# Patient Record
Sex: MALE | Race: WHITE | Employment: OTHER | URBAN - NONMETROPOLITAN AREA
[De-identification: names, ages, dates, MRNs, and addresses within clinical notes are randomized per-mention and may not be internally consistent; named-entity substitution may affect disease eponyms.]

---

## 2017-07-25 ENCOUNTER — HOSPITAL ENCOUNTER (EMERGENCY)
Age: 32
Discharge: HOME | End: 2017-07-25
Attending: EMERGENCY MEDICINE
Payer: MEDICARE

## 2017-07-25 VITALS
HEIGHT: 72 IN | RESPIRATION RATE: 15 BRPM | BODY MASS INDEX: 42.66 KG/M2 | WEIGHT: 315 LBS | OXYGEN SATURATION: 94 % | DIASTOLIC BLOOD PRESSURE: 79 MMHG | SYSTOLIC BLOOD PRESSURE: 136 MMHG | TEMPERATURE: 98.2 F | HEART RATE: 109 BPM

## 2017-07-25 DIAGNOSIS — I82.493 DEEP VEIN THROMBOSIS (DVT) OF OTHER VEIN OF BOTH LOWER EXTREMITIES, UNSPECIFIED CHRONICITY (HCC): Primary | ICD-10-CM

## 2017-07-25 LAB
ABSOLUTE EOS #: 0.3 K/UL (ref 0–0.4)
ABSOLUTE LYMPH #: 1.7 K/UL (ref 1–4.8)
ABSOLUTE MONO #: 0.6 K/UL (ref 0.1–1.2)
BASOPHILS # BLD: 1 %
BASOPHILS ABSOLUTE: 0.1 K/UL (ref 0–0.2)
DIFFERENTIAL TYPE: NORMAL
EOSINOPHILS RELATIVE PERCENT: 4 %
HCT VFR BLD CALC: 44.9 % (ref 41–53)
HEMOGLOBIN: 14.4 G/DL (ref 13.5–17.5)
INR BLD: 1
LYMPHOCYTES # BLD: 23 %
MCH RBC QN AUTO: 29 PG (ref 26–34)
MCHC RBC AUTO-ENTMCNC: 32 G/DL (ref 31–37)
MCV RBC AUTO: 90.6 FL (ref 80–100)
MONOCYTES # BLD: 8 %
PARTIAL THROMBOPLASTIN TIME: 24.5 SEC (ref 27–35)
PDW BLD-RTO: 13.9 % (ref 11–14.5)
PLATELET # BLD: 153 K/UL (ref 140–450)
PLATELET ESTIMATE: NORMAL
PMV BLD AUTO: 10 FL (ref 6–12)
PROTHROMBIN TIME: 11 SEC (ref 9.4–11.3)
RBC # BLD: 4.95 M/UL (ref 4.5–5.9)
RBC # BLD: NORMAL 10*6/UL
SEG NEUTROPHILS: 64 %
SEGMENTED NEUTROPHILS ABSOLUTE COUNT: 4.6 K/UL (ref 1.8–7.7)
WBC # BLD: 7.3 K/UL (ref 3.5–11)
WBC # BLD: NORMAL 10*3/UL

## 2017-07-25 PROCEDURE — 36415 COLL VENOUS BLD VENIPUNCTURE: CPT

## 2017-07-25 PROCEDURE — 6370000000 HC RX 637 (ALT 250 FOR IP): Performed by: EMERGENCY MEDICINE

## 2017-07-25 PROCEDURE — 96372 THER/PROPH/DIAG INJ SC/IM: CPT

## 2017-07-25 PROCEDURE — 6360000002 HC RX W HCPCS: Performed by: EMERGENCY MEDICINE

## 2017-07-25 PROCEDURE — 99283 EMERGENCY DEPT VISIT LOW MDM: CPT

## 2017-07-25 PROCEDURE — 85610 PROTHROMBIN TIME: CPT

## 2017-07-25 PROCEDURE — 85025 COMPLETE CBC W/AUTO DIFF WBC: CPT

## 2017-07-25 PROCEDURE — 85730 THROMBOPLASTIN TIME PARTIAL: CPT

## 2017-07-25 RX ORDER — WARFARIN SODIUM 5 MG/1
5 TABLET ORAL ONCE
Status: COMPLETED | OUTPATIENT
Start: 2017-07-25 | End: 2017-07-25

## 2017-07-25 RX ORDER — WARFARIN SODIUM 5 MG/1
5 TABLET ORAL DAILY
Qty: 7 TABLET | Refills: 0 | Status: SHIPPED | OUTPATIENT
Start: 2017-07-25 | End: 2018-02-19

## 2017-07-25 RX ADMIN — ENOXAPARIN SODIUM 100 MG: 100 INJECTION SUBCUTANEOUS at 18:07

## 2017-07-25 RX ADMIN — WARFARIN SODIUM 5 MG: 5 TABLET ORAL at 18:07

## 2017-08-11 ENCOUNTER — HOSPITAL ENCOUNTER (EMERGENCY)
Age: 32
Discharge: HOME | End: 2017-08-11
Attending: EMERGENCY MEDICINE
Payer: MEDICARE

## 2017-08-11 VITALS
DIASTOLIC BLOOD PRESSURE: 92 MMHG | SYSTOLIC BLOOD PRESSURE: 149 MMHG | RESPIRATION RATE: 16 BRPM | HEART RATE: 76 BPM | WEIGHT: 315 LBS | BODY MASS INDEX: 42.72 KG/M2 | TEMPERATURE: 98.2 F | OXYGEN SATURATION: 97 %

## 2017-08-11 DIAGNOSIS — R73.9 HYPERGLYCEMIA: Primary | ICD-10-CM

## 2017-08-11 LAB
-: NORMAL
ABSOLUTE EOS #: 0.3 K/UL (ref 0–0.4)
ABSOLUTE LYMPH #: 1.7 K/UL (ref 1–4.8)
ABSOLUTE MONO #: 0.5 K/UL (ref 0.1–1.2)
AMORPHOUS: NORMAL
ANION GAP SERPL CALCULATED.3IONS-SCNC: 14 MMOL/L (ref 9–17)
BACTERIA: NORMAL
BASOPHILS # BLD: 1 %
BASOPHILS ABSOLUTE: 0.1 K/UL (ref 0–0.2)
BILIRUBIN URINE: NEGATIVE
BUN BLDV-MCNC: 26 MG/DL (ref 6–20)
BUN/CREAT BLD: 52 (ref 9–20)
CALCIUM SERPL-MCNC: 9.3 MG/DL (ref 8.6–10.4)
CASTS UA: NORMAL /LPF (ref 0–2)
CHLORIDE BLD-SCNC: 95 MMOL/L (ref 98–107)
CO2: 25 MMOL/L (ref 20–31)
COLOR: ABNORMAL
COMMENT UA: ABNORMAL
CREAT SERPL-MCNC: 0.5 MG/DL (ref 0.7–1.2)
CRYSTALS, UA: NORMAL /HPF
DIFFERENTIAL TYPE: NORMAL
EOSINOPHILS RELATIVE PERCENT: 4 %
EPITHELIAL CELLS UA: NORMAL /HPF (ref 0–5)
GFR AFRICAN AMERICAN: >60 ML/MIN
GFR NON-AFRICAN AMERICAN: >60 ML/MIN
GFR SERPL CREATININE-BSD FRML MDRD: ABNORMAL ML/MIN/{1.73_M2}
GFR SERPL CREATININE-BSD FRML MDRD: ABNORMAL ML/MIN/{1.73_M2}
GLUCOSE BLD-MCNC: 434 MG/DL (ref 75–110)
GLUCOSE BLD-MCNC: 454 MG/DL (ref 75–110)
GLUCOSE BLD-MCNC: 595 MG/DL (ref 70–99)
GLUCOSE URINE: ABNORMAL
HCT VFR BLD CALC: 42.9 % (ref 41–53)
HEMOGLOBIN: 13.9 G/DL (ref 13.5–17.5)
KETONES, URINE: NEGATIVE
LEUKOCYTE ESTERASE, URINE: NEGATIVE
LYMPHOCYTES # BLD: 28 %
MCH RBC QN AUTO: 29.1 PG (ref 26–34)
MCHC RBC AUTO-ENTMCNC: 32.4 G/DL (ref 31–37)
MCV RBC AUTO: 89.6 FL (ref 80–100)
MONOCYTES # BLD: 8 %
MUCUS: NORMAL
NITRITE, URINE: NEGATIVE
OTHER OBSERVATIONS UA: NORMAL
PDW BLD-RTO: 13.6 % (ref 11–14.5)
PH UA: 5.5 (ref 5–6)
PLATELET # BLD: 148 K/UL (ref 140–450)
PLATELET ESTIMATE: NORMAL
PMV BLD AUTO: 9 FL (ref 6–12)
POTASSIUM SERPL-SCNC: 5 MMOL/L (ref 3.7–5.3)
PROTEIN UA: NEGATIVE
RBC # BLD: 4.79 M/UL (ref 4.5–5.9)
RBC # BLD: NORMAL 10*6/UL
RBC UA: NORMAL /HPF (ref 0–4)
RENAL EPITHELIAL, UA: NORMAL /HPF
SEG NEUTROPHILS: 59 %
SEGMENTED NEUTROPHILS ABSOLUTE COUNT: 3.7 K/UL (ref 1.8–7.7)
SODIUM BLD-SCNC: 134 MMOL/L (ref 135–144)
SPECIFIC GRAVITY UA: 1.01 (ref 1.01–1.02)
TRICHOMONAS: NORMAL
TURBIDITY: ABNORMAL
URINE HGB: NEGATIVE
UROBILINOGEN, URINE: NORMAL
WBC # BLD: 6.2 K/UL (ref 3.5–11)
WBC # BLD: NORMAL 10*3/UL
WBC UA: NORMAL /HPF (ref 0–4)
YEAST: NORMAL

## 2017-08-11 PROCEDURE — 6370000000 HC RX 637 (ALT 250 FOR IP): Performed by: EMERGENCY MEDICINE

## 2017-08-11 PROCEDURE — 93005 ELECTROCARDIOGRAM TRACING: CPT

## 2017-08-11 PROCEDURE — 2580000003 HC RX 258: Performed by: EMERGENCY MEDICINE

## 2017-08-11 PROCEDURE — 96372 THER/PROPH/DIAG INJ SC/IM: CPT

## 2017-08-11 PROCEDURE — 99285 EMERGENCY DEPT VISIT HI MDM: CPT

## 2017-08-11 PROCEDURE — 85025 COMPLETE CBC W/AUTO DIFF WBC: CPT

## 2017-08-11 PROCEDURE — 36415 COLL VENOUS BLD VENIPUNCTURE: CPT

## 2017-08-11 PROCEDURE — 81001 URINALYSIS AUTO W/SCOPE: CPT

## 2017-08-11 PROCEDURE — 82947 ASSAY GLUCOSE BLOOD QUANT: CPT

## 2017-08-11 PROCEDURE — 80048 BASIC METABOLIC PNL TOTAL CA: CPT

## 2017-08-11 RX ORDER — MORPHINE SULFATE 4 MG/ML
4 INJECTION, SOLUTION INTRAMUSCULAR; INTRAVENOUS ONCE
Status: DISCONTINUED | OUTPATIENT
Start: 2017-08-11 | End: 2017-08-11

## 2017-08-11 RX ORDER — 0.9 % SODIUM CHLORIDE 0.9 %
1000 INTRAVENOUS SOLUTION INTRAVENOUS ONCE
Status: COMPLETED | OUTPATIENT
Start: 2017-08-11 | End: 2017-08-11

## 2017-08-11 RX ORDER — SODIUM CHLORIDE 9 MG/ML
INJECTION, SOLUTION INTRAVENOUS CONTINUOUS
Status: DISCONTINUED | OUTPATIENT
Start: 2017-08-11 | End: 2017-08-11 | Stop reason: HOSPADM

## 2017-08-11 RX ADMIN — SODIUM CHLORIDE 1000 ML: 900 INJECTION, SOLUTION INTRAVENOUS at 10:17

## 2017-08-11 RX ADMIN — SODIUM CHLORIDE 1000 ML: 0.9 INJECTION, SOLUTION INTRAVENOUS at 10:50

## 2017-08-11 RX ADMIN — SODIUM CHLORIDE: 0.9 INJECTION, SOLUTION INTRAVENOUS at 12:45

## 2017-08-11 RX ADMIN — INSULIN HUMAN 12 UNITS: 100 INJECTION, SOLUTION PARENTERAL at 11:12

## 2017-08-11 ASSESSMENT — ENCOUNTER SYMPTOMS
VOMITING: 0
TROUBLE SWALLOWING: 0
DIARRHEA: 0
RHINORRHEA: 0
ABDOMINAL PAIN: 0
VOICE CHANGE: 0
NAUSEA: 0
COLOR CHANGE: 0
SHORTNESS OF BREATH: 0
COUGH: 0
BACK PAIN: 0

## 2017-08-14 LAB
EKG ATRIAL RATE: 76 BPM
EKG P AXIS: 28 DEGREES
EKG P-R INTERVAL: 170 MS
EKG Q-T INTERVAL: 412 MS
EKG QRS DURATION: 90 MS
EKG QTC CALCULATION (BAZETT): 463 MS
EKG R AXIS: 13 DEGREES
EKG T AXIS: 33 DEGREES
EKG VENTRICULAR RATE: 76 BPM

## 2018-02-05 ENCOUNTER — OFFICE VISIT (OUTPATIENT)
Dept: WOUND CARE | Age: 33
End: 2018-02-05
Payer: MEDICARE

## 2018-02-05 ENCOUNTER — TELEPHONE (OUTPATIENT)
Dept: WOUND CARE | Age: 33
End: 2018-02-05

## 2018-02-05 ENCOUNTER — HOSPITAL ENCOUNTER (OUTPATIENT)
Age: 33
Setting detail: SPECIMEN
Discharge: HOME | End: 2018-02-05
Payer: MEDICARE

## 2018-02-05 VITALS
RESPIRATION RATE: 16 BRPM | DIASTOLIC BLOOD PRESSURE: 74 MMHG | HEART RATE: 102 BPM | SYSTOLIC BLOOD PRESSURE: 113 MMHG | TEMPERATURE: 97.5 F

## 2018-02-05 DIAGNOSIS — L89.614 STAGE IV PRESSURE ULCER OF RIGHT HEEL (HCC): ICD-10-CM

## 2018-02-05 DIAGNOSIS — L89.620 DECUBITUS ULCER, HEEL, LEFT, UNSTAGEABLE (HCC): ICD-10-CM

## 2018-02-05 DIAGNOSIS — E11.51 CONTROLLED TYPE 2 DM WITH PERIPHERAL CIRCULATORY DISORDER (HCC): ICD-10-CM

## 2018-02-05 DIAGNOSIS — I73.9 PVD (PERIPHERAL VASCULAR DISEASE) (HCC): Primary | ICD-10-CM

## 2018-02-05 PROCEDURE — 87185 SC STD ENZYME DETCJ PER NZM: CPT

## 2018-02-05 PROCEDURE — 87075 CULTR BACTERIA EXCEPT BLOOD: CPT

## 2018-02-05 PROCEDURE — 11042 DBRDMT SUBQ TIS 1ST 20SQCM/<: CPT | Performed by: PODIATRIST

## 2018-02-05 PROCEDURE — G8417 CALC BMI ABV UP PARAM F/U: HCPCS | Performed by: PODIATRIST

## 2018-02-05 PROCEDURE — 1036F TOBACCO NON-USER: CPT | Performed by: PODIATRIST

## 2018-02-05 PROCEDURE — 87186 SC STD MICRODIL/AGAR DIL: CPT

## 2018-02-05 PROCEDURE — 87070 CULTURE OTHR SPECIMN AEROBIC: CPT

## 2018-02-05 PROCEDURE — 99203 OFFICE O/P NEW LOW 30 MIN: CPT | Performed by: PODIATRIST

## 2018-02-05 PROCEDURE — G8427 DOCREV CUR MEDS BY ELIG CLIN: HCPCS | Performed by: PODIATRIST

## 2018-02-05 PROCEDURE — 3046F HEMOGLOBIN A1C LEVEL >9.0%: CPT | Performed by: PODIATRIST

## 2018-02-05 PROCEDURE — 87205 SMEAR GRAM STAIN: CPT

## 2018-02-05 PROCEDURE — 87076 CULTURE ANAEROBE IDENT EACH: CPT

## 2018-02-05 PROCEDURE — 11045 DBRDMT SUBQ TISS EACH ADDL: CPT | Performed by: PODIATRIST

## 2018-02-05 PROCEDURE — G8484 FLU IMMUNIZE NO ADMIN: HCPCS | Performed by: PODIATRIST

## 2018-02-05 PROCEDURE — 86403 PARTICLE AGGLUT ANTBDY SCRN: CPT

## 2018-02-05 RX ORDER — CLINDAMYCIN HYDROCHLORIDE 300 MG/1
600 CAPSULE ORAL 3 TIMES DAILY
COMMUNITY
Start: 2018-02-01 | End: 2018-02-08

## 2018-02-05 RX ORDER — INSULIN GLARGINE 100 [IU]/ML
25 INJECTION, SOLUTION SUBCUTANEOUS 2 TIMES DAILY
COMMUNITY
Start: 2018-01-21 | End: 2019-01-08 | Stop reason: ALTCHOICE

## 2018-02-05 RX ORDER — OXYBUTYNIN CHLORIDE 5 MG/1
5 TABLET, EXTENDED RELEASE ORAL DAILY
Status: ON HOLD | COMMUNITY
Start: 2018-01-21 | End: 2019-03-04

## 2018-02-05 RX ORDER — CEFUROXIME AXETIL 500 MG/1
500 TABLET ORAL 2 TIMES DAILY
COMMUNITY
Start: 2018-02-03 | End: 2018-02-07

## 2018-02-05 RX ORDER — ROSUVASTATIN CALCIUM 40 MG/1
40 TABLET, COATED ORAL EVERY EVENING
COMMUNITY
Start: 2018-01-21

## 2018-02-05 RX ORDER — LEVOTHYROXINE SODIUM 0.03 MG/1
25 TABLET ORAL DAILY
COMMUNITY
Start: 2018-01-21

## 2018-02-05 RX ORDER — PIOGLITAZONEHYDROCHLORIDE 30 MG/1
30 TABLET ORAL DAILY
COMMUNITY
Start: 2018-01-21 | End: 2020-10-20 | Stop reason: ALTCHOICE

## 2018-02-05 NOTE — PROGRESS NOTES
Decreased bilateral.  Paresthesias positive. Dysthesias negative. Sharp/dull intact bilateral.    Musculoskeletal: Muscle strength 5/5, bilateral.  Pain absent upon palpation bilateral. Normal medial longitudinal arch, bilateral.  Ankle ROM within normal limits,bilateral.  1st MPJ ROM within normal limits, bilateral.  Dorsally contracted digits absent. No other foot deformities. Integument:   Open lesion present, Right. Ulcer plantar R heel, non viable fibrin and slough, mild red granular base o n edges, sub q fat centrally,  probing to bone, no undermining no malodor. [place] ucler edema noted, no proximal streaking. . L heel unstageable decub ulcer noted. No skin breakdown on edges, no signs of infx. Interdigital maceration absent to web spaces , Bilateral.  Nails thickened, dystrophic and crumbly, discolored with subungual debris. Fissures absent, Bilateral. Hyperkeratotic tissue is absent. Wound 02/05/18 #1: right heel (Active)   Wound Type Wound 2/5/2018  1:29 PM   Wound Pressure Stage  4 2/5/2018  1:29 PM   Wound Cleansed Rinsed/Irrigated with saline 2/5/2018  1:29 PM   Wound Length (cm) 6.3 cm 2/5/2018  1:29 PM   Wound Width (cm) 9.4 cm 2/5/2018  1:29 PM   Wound Depth (cm)  2.2 2/5/2018  1:29 PM   Calculated Wound Size (cm^2) (l*w) 59.22 cm^2 2/5/2018  1:29 PM   Wound Assessment Black;Red;Yellow 2/5/2018  1:29 PM   Drainage Amount Large 2/5/2018  1:29 PM   Odor None 2/5/2018  1:29 PM   [place]-wound Assessment Dry;Calloused 2/5/2018  1:29 PM   Non-staged Wound Description Full thickness 2/5/2018  1:29 PM   Red%Wound Bed 40 2/5/2018  1:29 PM   Yellow%Wound Bed 40 2/5/2018  1:29 PM   Black%Wound Bed 20 2/5/2018  1:29 PM   Culture Taken Yes 2/5/2018  1:50 PM   Number of days: 0             Asessment: Patient is a 34 y.o. male with:   1. PVD (peripheral vascular disease) ([address]     2. Stage IV pressure ulcer of right heel (HCC)  Tissue Culture    SD DEBRIDEMENT, SKIN, SUB-Q TISSUE,=<20 SQ CM   3.  Controlled

## 2018-02-07 ENCOUNTER — TELEPHONE (OUTPATIENT)
Dept: WOUND CARE | Age: 33
End: 2018-02-07

## 2018-02-07 ENCOUNTER — TELEPHONE (OUTPATIENT)
Dept: SURGERY | Age: 33
End: 2018-02-07

## 2018-02-07 NOTE — TELEPHONE ENCOUNTER
Patient is a current patient at [address] [place] [address], on Wednesdays the nursing home has a CNP who does there wound care follow him. Mary Alice Anne is questioning if they should still have the wound care team follow him while in the nursing home or if they should only have Dr. Víctor Villatoro see the patient.

## 2018-02-10 LAB
CULTURE: ABNORMAL
DIRECT EXAM: ABNORMAL
Lab: ABNORMAL
ORGANISM: ABNORMAL
SPECIMEN DESCRIPTION: ABNORMAL
STATUS: ABNORMAL

## 2018-02-19 ENCOUNTER — OFFICE VISIT (OUTPATIENT)
Dept: WOUND CARE | Age: 33
End: 2018-02-19
Payer: MEDICARE

## 2018-02-19 ENCOUNTER — TELEPHONE (OUTPATIENT)
Dept: WOUND CARE | Age: 33
End: 2018-02-19

## 2018-02-19 VITALS — TEMPERATURE: 99.5 F | HEART RATE: 64 BPM | DIASTOLIC BLOOD PRESSURE: 72 MMHG | SYSTOLIC BLOOD PRESSURE: 108 MMHG

## 2018-02-19 DIAGNOSIS — L89.614 STAGE IV PRESSURE ULCER OF RIGHT HEEL (HCC): Primary | ICD-10-CM

## 2018-02-19 PROCEDURE — 11042 DBRDMT SUBQ TIS 1ST 20SQCM/<: CPT | Performed by: PODIATRIST

## 2018-02-19 NOTE — PROGRESS NOTES
Subjective:    Kelly Alonso is a 34 y.o. male who presents with the ulceration of the foot. Patient has been compliant with off loading. Patient relates pain is Absent . Pain is rated 0 out of 10 and is described as none. Currently denies F/C/N/V. No Known Allergies    Past Medical History:   Diagnosis Date    Diabetes mellitus ([facility] [address]     Hyperlipidemia     Prader-Willi syndrome     Sleep apnea        Prior to Admission medications    Medication Sig Start Date End Date Taking? Authorizing Provider   insulin glargine (LANTUS) 100 UNIT/ML injection vial Inject 25 Units into the skin 2 times daily 1/21/18   Historical Provider, MD   insulin regular (HUMULIN R;NOVOLIN R) 100 UNIT/ML injection Inject 12 Units into the skin 3 times daily (before meals) 1/21/18   Historical Provider, MD   levothyroxine (SYNTHROID) 50 MCG tablet Take 50 mcg by mouth Daily 1/21/18   Historical Provider, MD   oxybutynin (DITROPAN-XL) 5 MG extended release tablet Take 5 mg by mouth daily 1/21/18   Historical Provider, MD   pioglitazone (ACTOS) 15 MG tablet Take 15 mg by mouth daily 1/21/18   Historical Provider, MD   rosuvastatin (CRESTOR) 40 MG tablet Take 40 mg by mouth every evening 1/21/18   Historical Provider, MD   warfarin (COUMADIN) 5 MG tablet Take 1 tablet by mouth daily 7/25/17   Chetan Mathews DO   albuterol sulfate  (90 BASE) MCG/ACT inhaler Inhale 2 puffs into the lungs every 6 hours as needed for Wheezing    Historical Provider, MD   CPAP Machine MISC by Does not apply route nightly    Historical Provider, MD   ondansetron (ZOFRAN ODT) 4 MG disintegrating tablet Take 1 tablet by mouth every 8 hours as needed for Nausea or Vomiting 2/24/16   MEGAN Yusuf   dicyclomine (BENTYL) 10 MG capsule Take 1 capsule by mouth 3 times daily as needed (diarrhea/abdominal cramping) 2/24/16   MEGAN Yusuf   warfarin (COUMADIN) 7.5 MG tablet Take daily. Follow up with PCP in 3 days and thereafter for PT/INR checks.

## 2018-02-19 NOTE — PATIENT INSTRUCTIONS
Continue Dakin's dressing to right heel ulcer until next appointment. (If wound looks appropriate next week, Dr. Ryne Toro will order a Wound VAC.)    The Blue Heel suspension boot that is on the left is appropriate. The lambswool boot on the right IS NOT. THERE IS NO OPEN AREA FOR HEEL SUSPENSION AND PRESSURE RELIEF. Please replace ASAP. Follow up 1 week.

## 2018-02-26 ENCOUNTER — TELEPHONE (OUTPATIENT)
Dept: WOUND CARE | Age: 33
End: 2018-02-26

## 2018-02-26 ENCOUNTER — OFFICE VISIT (OUTPATIENT)
Dept: WOUND CARE | Age: 33
End: 2018-02-26
Payer: MEDICARE

## 2018-02-26 VITALS
DIASTOLIC BLOOD PRESSURE: 80 MMHG | TEMPERATURE: 98.1 F | HEART RATE: 84 BPM | RESPIRATION RATE: 18 BRPM | SYSTOLIC BLOOD PRESSURE: 148 MMHG

## 2018-02-26 DIAGNOSIS — E11.49 DM (DIABETES MELLITUS), TYPE 2 WITH NEUROLOGICAL COMPLICATIONS (HCC): ICD-10-CM

## 2018-02-26 DIAGNOSIS — L89.614 STAGE IV PRESSURE ULCER OF RIGHT HEEL (HCC): Primary | ICD-10-CM

## 2018-02-26 PROCEDURE — 11042 DBRDMT SUBQ TIS 1ST 20SQCM/<: CPT | Performed by: PODIATRIST

## 2018-02-26 NOTE — TELEPHONE ENCOUNTER
Call to [facility], spoke with staff nurse, Sandeep Yu, to confirm progress notes  fax received. Sandeep Yu states new orders were reviewed; wound vac ordered w/ planned placement tomorrow, 2/27. Faxed AVS with Wound Care instructions and Progress Note to [facility].

## 2018-02-26 NOTE — PROGRESS NOTES
Subjective:    Yaritza Arnold is a 34 y.o. male who presents with the ulceration of the foot. Patient has been compliant with off loading. Patient relates pain is Absent . Pain is rated 0 out of 10 and is described as none. Currently denies F/C/N/V. No Known Allergies    Past Medical History:   Diagnosis Date    Diabetes mellitus ([facility] [address]     Hyperlipidemia     Prader-Willi syndrome     Sleep apnea        Prior to Admission medications    Medication Sig Start Date End Date Taking? Authorizing Provider   insulin glargine (LANTUS) 100 UNIT/ML injection vial Inject 25 Units into the skin 2 times daily 1/21/18   Historical Provider, MD   insulin regular (HUMULIN R;NOVOLIN R) 100 UNIT/ML injection Inject 12 Units into the skin 3 times daily (before meals) 1/21/18   Historical Provider, MD   levothyroxine (SYNTHROID) 50 MCG tablet Take 50 mcg by mouth Daily 1/21/18   Historical Provider, MD   oxybutynin (DITROPAN-XL) 5 MG extended release tablet Take 5 mg by mouth daily 1/21/18   Historical Provider, MD   pioglitazone (ACTOS) 15 MG tablet Take 15 mg by mouth daily 1/21/18   Historical Provider, MD   rosuvastatin (CRESTOR) 40 MG tablet Take 40 mg by mouth every evening 1/21/18   Historical Provider, MD   albuterol sulfate  (90 BASE) MCG/ACT inhaler Inhale 2 puffs into the lungs every 6 hours as needed for Wheezing    Historical Provider, MD   CPAP Machine MISC by Does not apply route nightly    Historical Provider, MD   metFORMIN (GLUCOPHAGE) 500 MG tablet Take 1,000 mg by mouth 2 times daily (with meals)  1/21/18   Historical Provider, MD       Social History   Substance Use Topics    Smoking status: Never Smoker    Smokeless tobacco: Never Used    Alcohol use No       Review of Systems: All 12 systems reviewed and pertinent positives noted above.     Lower Extremity Physical Examination:     Vitals:   Vitals:    02/26/18 1305   BP: (!) 150/80   Pulse: 84   Resp: 18   Temp: 98.1 °F (36.7 °C)     General: AAO x 3 in NAD. Vascular: DP and PT pulses palpable 2/4, bilateral.  CFT <3 seconds, bilateral.  Hair growth present to the level of the digits, bilateral.  Edema present, bilateral.  Varicosities present, bilateral. Erythema absent, bilateral. Distal Rubor absent bilateral.  Temperature within normal limits bilateral. Hyperpigmentation present bilateral. Atrophic skin yes. Neurological: Sensation Impaired to light touch to level of digits, bilateral.  Protective sensation intact  10/10 sites via 5.07/10g [place]att[place]-Tyrese Monofilament, bilateral.  negative Tinel's, bilateral.  negative Valleix sign, bilateral.  Vibratory abnormal  bilateral.  Reflexes Decreased bilateral.  Paresthesias positive. Dysthesias negative. Sharp/dull intact bilateral.    Musculoskeletal: Muscle strength 5/5, bilateral.  Pain absent upon palpation bilateral. Normal medial longitudinal arch, bilateral.  Ankle ROM within normal limits,bilateral.  1st MPJ ROM within normal limits, bilateral.  Dorsally contracted digits absent. No other foot deformities. Integument:   Open lesion present, Right. Ulcer plantar R heel, non viable fibrin and slough, increased red granular base on edges, sub q fat centrally,  probing to bone but filled in a lot, no undermining no malodor. [place] ucler edema decreased no proximal streaking. L heel unstageable decub ulcer noted. No skin breakdown on edges, no signs of infx. Interdigital maceration absent to web spaces , Bilateral.  Nails thickened, dystrophic and crumbly, discolored with subungual debris. Fissures absent, Bilateral. Hyperkeratotic tissue is absent.     Wound 02/05/18 #1: right heel (Active)   Wound Type Wound 2/5/2018  1:29 PM   Wound Pressure Stage  4 2/5/2018  1:29 PM   Wound Cleansed Rinsed/Irrigated with saline 2/26/2018 12:57 PM   Wound Length (cm) 8 cm 2/26/2018 12:57 PM   Wound Width (cm) 3.2 cm 2/26/2018 12:57 PM   Wound Depth (cm)  3.0 2/26/2018 12:57 PM   Calculated Wound Size (cm^2) heel.  Continue Heel lift offloading boot to wear at all times to relieve pressure from heel. Pt was educated on importance of this and how this can facilitate healing. Information was given on how to order this device. R side boot does not appear to be sufficient enough, needs to offload more at the heel itself  Contact clinic with any questions/problems/concerns or new signs of infection. Follow-up at [facility] in 1 week(s).

## 2018-03-05 ENCOUNTER — OFFICE VISIT (OUTPATIENT)
Dept: WOUND CARE | Age: 33
End: 2018-03-05
Payer: MEDICARE

## 2018-03-05 VITALS — DIASTOLIC BLOOD PRESSURE: 78 MMHG | TEMPERATURE: 98.7 F | SYSTOLIC BLOOD PRESSURE: 110 MMHG | HEART RATE: 90 BPM

## 2018-03-05 DIAGNOSIS — L89.614 STAGE IV PRESSURE ULCER OF RIGHT HEEL (HCC): Primary | ICD-10-CM

## 2018-03-05 DIAGNOSIS — E11.51 CONTROLLED TYPE 2 DM WITH PERIPHERAL CIRCULATORY DISORDER (HCC): ICD-10-CM

## 2018-03-05 PROCEDURE — 11042 DBRDMT SUBQ TIS 1ST 20SQCM/<: CPT | Performed by: PODIATRIST

## 2018-03-05 NOTE — PROGRESS NOTES
obtained with direct pressure. Patient related pain absent post debridement. Orders Placed This Encounter   Procedures    RI DEBRIDEMENT, SKIN, SUB-Q TISSUE,=<20 SQ CM      continue wound VAC ordered. 150 mmHg continuous black foam.  Risks and benefits discussed with pt in detail. Nursing home will be enecouraged to use [employer] brand vac therapy. They have been using off brand and have had issues. They should continue skin prep and consider duoderm or coloplast to enhance the seal  Patient advised importance of continued offloading and stressed the importance of this in regards to wound healing. Today's dressing: saline wet to dry R heel. Continue Heel lift offloading boot to wear at all times to relieve pressure from heel. Pt was educated on importance of this and how this can facilitate healing. Information was given on how to order this device. R side boot does not appear to be sufficient enough, needs to offload more at the heel itself  Contact clinic with any questions/problems/concerns or new signs of infection. Follow-up at [facility] in 1 week(s).

## 2018-03-07 ENCOUNTER — TELEPHONE (OUTPATIENT)
Dept: WOUND CARE | Age: 33
End: 2018-03-07

## 2018-03-12 ENCOUNTER — OFFICE VISIT (OUTPATIENT)
Dept: WOUND CARE | Age: 33
End: 2018-03-12
Payer: MEDICARE

## 2018-03-12 VITALS — SYSTOLIC BLOOD PRESSURE: 138 MMHG | TEMPERATURE: 97.9 F | HEART RATE: 88 BPM | DIASTOLIC BLOOD PRESSURE: 84 MMHG

## 2018-03-12 DIAGNOSIS — L89.614 STAGE IV PRESSURE ULCER OF RIGHT HEEL (HCC): Primary | ICD-10-CM

## 2018-03-12 PROCEDURE — 11042 DBRDMT SUBQ TIS 1ST 20SQCM/<: CPT | Performed by: PODIATRIST

## 2018-03-14 ENCOUNTER — TELEPHONE (OUTPATIENT)
Dept: WOUND CARE | Age: 33
End: 2018-03-14

## 2018-03-19 ENCOUNTER — OFFICE VISIT (OUTPATIENT)
Dept: WOUND CARE | Age: 33
End: 2018-03-19
Payer: COMMERCIAL

## 2018-03-19 VITALS
HEART RATE: 95 BPM | RESPIRATION RATE: 16 BRPM | DIASTOLIC BLOOD PRESSURE: 78 MMHG | SYSTOLIC BLOOD PRESSURE: 120 MMHG | TEMPERATURE: 98.7 F

## 2018-03-19 DIAGNOSIS — E11.51 CONTROLLED TYPE 2 DM WITH PERIPHERAL CIRCULATORY DISORDER (HCC): ICD-10-CM

## 2018-03-19 DIAGNOSIS — L89.614 STAGE IV PRESSURE ULCER OF RIGHT HEEL (HCC): Primary | ICD-10-CM

## 2018-03-19 DIAGNOSIS — L89.622 DECUBITUS ULCER OF LEFT HEEL, STAGE 2 (HCC): ICD-10-CM

## 2018-03-19 PROCEDURE — 97597 DBRDMT OPN WND 1ST 20 CM/<: CPT | Performed by: PODIATRIST

## 2018-03-19 NOTE — PROGRESS NOTES
Subjective:    Zohra Schaffer is a 34 y.o. male who presents with the ulceration of the foot. Patient has been compliant with off loading. Pt does not think dressing has been on L heel. Patient relates pain is Absent . Pain is rated 0 out of 10 and is described as none. Currently denies F/C/N/V. No Known Allergies    Past Medical History:   Diagnosis Date    Diabetes mellitus ([facility] [address]     Hyperlipidemia     Prader-Willi syndrome     Sleep apnea        Prior to Admission medications    Medication Sig Start Date End Date Taking? Authorizing Provider   insulin glargine (LANTUS) 100 UNIT/ML injection vial Inject 25 Units into the skin 2 times daily 1/21/18  Yes Historical Provider, MD   insulin regular (HUMULIN R;NOVOLIN R) 100 UNIT/ML injection Inject 12 Units into the skin 3 times daily (before meals) 1/21/18  Yes Historical Provider, MD   levothyroxine (SYNTHROID) 50 MCG tablet Take 50 mcg by mouth Daily 1/21/18  Yes Historical Provider, MD   oxybutynin (DITROPAN-XL) 5 MG extended release tablet Take 5 mg by mouth daily 1/21/18  Yes Historical Provider, MD   pioglitazone (ACTOS) 15 MG tablet Take 15 mg by mouth daily 1/21/18  Yes Historical Provider, MD   rosuvastatin (CRESTOR) 40 MG tablet Take 40 mg by mouth every evening 1/21/18  Yes Historical Provider, MD   albuterol sulfate  (90 BASE) MCG/ACT inhaler Inhale 2 puffs into the lungs every 6 hours as needed for Wheezing   Yes Historical Provider, MD   CPAP Machine MISC by Does not apply route nightly   Yes Historical Provider, MD   metFORMIN (GLUCOPHAGE) 500 MG tablet Take 1,000 mg by mouth 2 times daily (with meals)  1/21/18  Yes Historical Provider, MD       Social History   Substance Use Topics    Smoking status: Never Smoker    Smokeless tobacco: Never Used    Alcohol use No       Review of Systems: All 12 systems reviewed and pertinent positives noted above.     Lower Extremity Physical Examination:     Vitals:   Vitals:    03/19/18 1329   BP:

## 2018-03-21 ENCOUNTER — TELEPHONE (OUTPATIENT)
Dept: WOUND CARE | Age: 33
End: 2018-03-21

## 2018-03-26 ENCOUNTER — OFFICE VISIT (OUTPATIENT)
Dept: WOUND CARE | Age: 33
End: 2018-03-26
Payer: COMMERCIAL

## 2018-03-26 VITALS
RESPIRATION RATE: 16 BRPM | HEART RATE: 90 BPM | TEMPERATURE: 98 F | SYSTOLIC BLOOD PRESSURE: 142 MMHG | DIASTOLIC BLOOD PRESSURE: 90 MMHG

## 2018-03-26 DIAGNOSIS — E11.51 CONTROLLED TYPE 2 DM WITH PERIPHERAL CIRCULATORY DISORDER (HCC): ICD-10-CM

## 2018-03-26 DIAGNOSIS — L89.614 STAGE IV PRESSURE ULCER OF RIGHT HEEL (HCC): Primary | ICD-10-CM

## 2018-03-26 DIAGNOSIS — L89.622 DECUBITUS ULCER OF LEFT HEEL, STAGE 2 (HCC): ICD-10-CM

## 2018-03-26 PROCEDURE — 97597 DBRDMT OPN WND 1ST 20 CM/<: CPT | Performed by: PODIATRIST

## 2018-03-26 NOTE — PROGRESS NOTES
(36.7 °C)     General: AAO x 3 in NAD. Vascular: DP and PT pulses palpable 2/4, bilateral.  CFT <3 seconds, bilateral.  Hair growth present to the level of the digits, bilateral.  Edema present, bilateral.  Varicosities present, bilateral. Erythema absent, bilateral. Distal Rubor absent bilateral.  Temperature within normal limits bilateral. Hyperpigmentation present bilateral. Atrophic skin yes. Neurological: Sensation Impaired to light touch to level of digits, bilateral.  Protective sensation intact  10/10 sites via 5.07/10g [place]-Tyrese Monofilament, bilateral.  negative Tinel's, bilateral.  negative Valleix sign, bilateral.  Vibratory abnormal  bilateral.  Reflexes Decreased bilateral.  Paresthesias positive. Dysthesias negative. Sharp/dull intact bilateral.  Musculoskeletal: Muscle strength 5/5, bilateral.  Pain absent upon palpation bilateral. Normal medial longitudinal arch, bilateral.  Ankle ROM within normal limits,bilateral.  1st MPJ ROM within normal limits, bilateral.  Dorsally contracted digits absent. No other foot deformities. Integument:   Open lesion present, Right. Ulcer plantar R heel, non viable fibrin and slough, increased red granular base on edges,  No probing to bone ,no undermining no malodor. [place] ucler edema decreased no proximal streaking. L heel stage 2 decub ulcer resolved  Interdigital maceration absent to web spaces , Bilateral.  Nails thickened, dystrophic and crumbly, discolored with subungual debris. Fissures absent, Bilateral. Hyperkeratotic tissue is absent.   Wound 02/05/18 #1: right heel (Active)   Wound Type Wound 3/19/2018  1:24 PM   Wound Pressure Stage  4 3/19/2018  1:24 PM   Dressing/Treatment Vacuum dressing 3/26/2018  1:15 PM   Wound Cleansed Rinsed/Irrigated with saline 3/26/2018  1:15 PM   Wound Length (cm) 6 cm 3/26/2018  1:15 PM   Wound Width (cm) 2.8 cm 3/26/2018  1:15 PM   Wound Depth (cm)  0.5 3/26/2018  1:15 PM   Calculated Wound Size (cm^2) (l*w) removed to promote healing. Please see chart for exact measurements, if not documented then size was less than 20 sq cm.   continue wound VAC. 150 mmHg continuous black foam.  Risks and benefits discussed with pt in detail. Nursing home will be encouraged to use [employer] brand vac therapy. Seems to have been doing better the past week  No debridement needed of the L heel ulcer today. The ulcer is completley resolved at this time. Pt was educated about means of prevention and risk factor modification. Pt should return to the clinic PRN if any further ulceration should occur. Patient advised importance of continued offloading and stressed the importance of this in regards to wound healing. Today's dressing: saline wet to dry R heel, foam L heel  Continue Heel lift offloading boot to wear at all times to relieve pressure from heel. Pt was educated on importance of this and how this can facilitate healing. Information was given on how to order this device. R side boot does not appear to be sufficient enough, needs to offload more at the heel itself  Will pre certify for apligraf application at this time. Pt was educated on procedure and potential benefit of this advanced wound healing modality. All questions were answered  Contact clinic with any questions/problems/concerns or new signs of infection. Follow-up at [facility] in 1 week(s).

## 2018-04-02 ENCOUNTER — OFFICE VISIT (OUTPATIENT)
Dept: WOUND CARE | Age: 33
End: 2018-04-02
Payer: COMMERCIAL

## 2018-04-02 ENCOUNTER — TELEPHONE (OUTPATIENT)
Dept: WOUND CARE | Age: 33
End: 2018-04-02

## 2018-04-02 VITALS
TEMPERATURE: 98.4 F | SYSTOLIC BLOOD PRESSURE: 100 MMHG | RESPIRATION RATE: 18 BRPM | HEART RATE: 74 BPM | DIASTOLIC BLOOD PRESSURE: 70 MMHG

## 2018-04-02 DIAGNOSIS — I73.9 PVD (PERIPHERAL VASCULAR DISEASE) (HCC): ICD-10-CM

## 2018-04-02 DIAGNOSIS — L89.614 STAGE IV PRESSURE ULCER OF RIGHT HEEL (HCC): Primary | ICD-10-CM

## 2018-04-02 PROCEDURE — 15275 SKIN SUB GRAFT FACE/NK/HF/G: CPT | Performed by: PODIATRIST

## 2018-04-02 PROCEDURE — 97597 DBRDMT OPN WND 1ST 20 CM/<: CPT | Performed by: PODIATRIST

## 2018-04-02 RX ORDER — CEPHALEXIN 500 MG/1
500 CAPSULE ORAL 4 TIMES DAILY
COMMUNITY
Start: 2018-03-31 | End: 2018-04-07

## 2018-04-02 RX ORDER — CARBAMAZEPINE 200 MG/1
200 TABLET, EXTENDED RELEASE ORAL 2 TIMES DAILY
COMMUNITY
Start: 2018-03-01 | End: 2019-09-16

## 2018-04-09 ENCOUNTER — OFFICE VISIT (OUTPATIENT)
Dept: WOUND CARE | Age: 33
End: 2018-04-09
Payer: MEDICARE

## 2018-04-09 VITALS
DIASTOLIC BLOOD PRESSURE: 90 MMHG | HEART RATE: 90 BPM | SYSTOLIC BLOOD PRESSURE: 148 MMHG | RESPIRATION RATE: 18 BRPM | TEMPERATURE: 99.2 F

## 2018-04-09 DIAGNOSIS — L89.614 STAGE IV PRESSURE ULCER OF RIGHT HEEL (HCC): Primary | ICD-10-CM

## 2018-04-09 DIAGNOSIS — E11.51 CONTROLLED TYPE 2 DM WITH PERIPHERAL CIRCULATORY DISORDER (HCC): ICD-10-CM

## 2018-04-09 PROCEDURE — 15275 SKIN SUB GRAFT FACE/NK/HF/G: CPT | Performed by: PODIATRIST

## 2018-04-12 ENCOUNTER — TELEPHONE (OUTPATIENT)
Dept: WOUND CARE | Age: 33
End: 2018-04-12

## 2018-04-13 ENCOUNTER — TELEPHONE (OUTPATIENT)
Dept: WOUND CARE | Age: 33
End: 2018-04-13

## 2018-04-16 ENCOUNTER — OFFICE VISIT (OUTPATIENT)
Dept: WOUND CARE | Age: 33
End: 2018-04-16
Payer: MEDICARE

## 2018-04-16 VITALS
SYSTOLIC BLOOD PRESSURE: 157 MMHG | HEART RATE: 90 BPM | DIASTOLIC BLOOD PRESSURE: 83 MMHG | OXYGEN SATURATION: 96 % | TEMPERATURE: 97.8 F

## 2018-04-16 DIAGNOSIS — L89.614 STAGE IV PRESSURE ULCER OF RIGHT HEEL (HCC): Primary | ICD-10-CM

## 2018-04-16 DIAGNOSIS — E11.51 CONTROLLED TYPE 2 DM WITH PERIPHERAL CIRCULATORY DISORDER (HCC): ICD-10-CM

## 2018-04-16 PROCEDURE — 15275 SKIN SUB GRAFT FACE/NK/HF/G: CPT | Performed by: PODIATRIST

## 2018-04-18 ENCOUNTER — TELEPHONE (OUTPATIENT)
Dept: WOUND CARE | Age: 33
End: 2018-04-18

## 2018-04-23 ENCOUNTER — OFFICE VISIT (OUTPATIENT)
Dept: WOUND CARE | Age: 33
End: 2018-04-23
Payer: MEDICARE

## 2018-04-23 ENCOUNTER — TELEPHONE (OUTPATIENT)
Dept: WOUND CARE | Age: 33
End: 2018-04-23

## 2018-04-23 VITALS
SYSTOLIC BLOOD PRESSURE: 148 MMHG | DIASTOLIC BLOOD PRESSURE: 82 MMHG | HEART RATE: 84 BPM | TEMPERATURE: 98.3 F | RESPIRATION RATE: 16 BRPM

## 2018-04-23 DIAGNOSIS — L89.614 STAGE IV PRESSURE ULCER OF RIGHT HEEL (HCC): Primary | ICD-10-CM

## 2018-04-23 DIAGNOSIS — E11.51 CONTROLLED TYPE 2 DM WITH PERIPHERAL CIRCULATORY DISORDER (HCC): ICD-10-CM

## 2018-04-23 PROCEDURE — 15275 SKIN SUB GRAFT FACE/NK/HF/G: CPT | Performed by: PODIATRIST

## 2018-04-30 ENCOUNTER — OFFICE VISIT (OUTPATIENT)
Dept: WOUND CARE | Age: 33
End: 2018-04-30
Payer: MEDICARE

## 2018-04-30 VITALS
SYSTOLIC BLOOD PRESSURE: 140 MMHG | HEART RATE: 86 BPM | TEMPERATURE: 97.2 F | RESPIRATION RATE: 16 BRPM | DIASTOLIC BLOOD PRESSURE: 80 MMHG

## 2018-04-30 DIAGNOSIS — L89.614 STAGE IV PRESSURE ULCER OF RIGHT HEEL (HCC): Primary | ICD-10-CM

## 2018-04-30 PROCEDURE — 97597 DBRDMT OPN WND 1ST 20 CM/<: CPT | Performed by: PODIATRIST

## 2018-05-02 ENCOUNTER — TELEPHONE (OUTPATIENT)
Dept: WOUND CARE | Age: 33
End: 2018-05-02

## 2018-05-07 ENCOUNTER — OFFICE VISIT (OUTPATIENT)
Dept: WOUND CARE | Age: 33
End: 2018-05-07
Payer: MEDICARE

## 2018-05-07 VITALS — DIASTOLIC BLOOD PRESSURE: 84 MMHG | TEMPERATURE: 97.6 F | HEART RATE: 80 BPM | SYSTOLIC BLOOD PRESSURE: 156 MMHG

## 2018-05-07 DIAGNOSIS — L89.614 STAGE IV PRESSURE ULCER OF RIGHT HEEL (HCC): Primary | ICD-10-CM

## 2018-05-07 PROCEDURE — 97597 DBRDMT OPN WND 1ST 20 CM/<: CPT | Performed by: PODIATRIST

## 2018-05-09 ENCOUNTER — TELEPHONE (OUTPATIENT)
Dept: WOUND CARE | Age: 33
End: 2018-05-09

## 2018-05-21 ENCOUNTER — OFFICE VISIT (OUTPATIENT)
Dept: WOUND CARE | Age: 33
End: 2018-05-21
Payer: MEDICARE

## 2018-05-21 VITALS
SYSTOLIC BLOOD PRESSURE: 152 MMHG | HEART RATE: 90 BPM | DIASTOLIC BLOOD PRESSURE: 98 MMHG | TEMPERATURE: 98.5 F | RESPIRATION RATE: 16 BRPM | OXYGEN SATURATION: 99 %

## 2018-05-21 DIAGNOSIS — L89.614 STAGE IV PRESSURE ULCER OF RIGHT HEEL (HCC): Primary | ICD-10-CM

## 2018-05-21 PROCEDURE — 1036F TOBACCO NON-USER: CPT | Performed by: PODIATRIST

## 2018-05-21 PROCEDURE — G8427 DOCREV CUR MEDS BY ELIG CLIN: HCPCS | Performed by: PODIATRIST

## 2018-05-21 PROCEDURE — G8417 CALC BMI ABV UP PARAM F/U: HCPCS | Performed by: PODIATRIST

## 2018-05-21 PROCEDURE — 99212 OFFICE O/P EST SF 10 MIN: CPT | Performed by: PODIATRIST

## 2018-05-22 ENCOUNTER — TELEPHONE (OUTPATIENT)
Dept: WOUND CARE | Age: 33
End: 2018-05-22

## 2018-07-16 ENCOUNTER — OFFICE VISIT (OUTPATIENT)
Dept: WOUND CARE | Age: 33
End: 2018-07-16
Payer: COMMERCIAL

## 2018-07-16 VITALS — SYSTOLIC BLOOD PRESSURE: 141 MMHG | HEART RATE: 86 BPM | TEMPERATURE: 98.8 F | DIASTOLIC BLOOD PRESSURE: 82 MMHG

## 2018-07-16 DIAGNOSIS — L97.411 ULCER OF RIGHT HEEL AND MIDFOOT, LIMITED TO BREAKDOWN OF SKIN (HCC): Primary | ICD-10-CM

## 2018-07-16 DIAGNOSIS — E11.51 CONTROLLED TYPE 2 DM WITH PERIPHERAL CIRCULATORY DISORDER (HCC): ICD-10-CM

## 2018-07-16 PROBLEM — L89.622 DECUBITUS ULCER OF LEFT HEEL, STAGE 2 (HCC): Status: RESOLVED | Noted: 2018-03-19 | Resolved: 2018-07-16

## 2018-07-16 PROBLEM — L89.614 STAGE IV PRESSURE ULCER OF RIGHT HEEL (HCC): Status: RESOLVED | Noted: 2018-02-05 | Resolved: 2018-07-16

## 2018-07-16 PROCEDURE — 97597 DBRDMT OPN WND 1ST 20 CM/<: CPT | Performed by: PODIATRIST

## 2018-07-16 PROCEDURE — 3046F HEMOGLOBIN A1C LEVEL >9.0%: CPT | Performed by: PODIATRIST

## 2018-07-16 PROCEDURE — G8417 CALC BMI ABV UP PARAM F/U: HCPCS | Performed by: PODIATRIST

## 2018-07-16 PROCEDURE — 1036F TOBACCO NON-USER: CPT | Performed by: PODIATRIST

## 2018-07-16 PROCEDURE — 2022F DILAT RTA XM EVC RTNOPTHY: CPT | Performed by: PODIATRIST

## 2018-07-16 PROCEDURE — 99213 OFFICE O/P EST LOW 20 MIN: CPT | Performed by: PODIATRIST

## 2018-07-16 PROCEDURE — G8427 DOCREV CUR MEDS BY ELIG CLIN: HCPCS | Performed by: PODIATRIST

## 2018-07-16 RX ORDER — LOPERAMIDE HYDROCHLORIDE 2 MG/1
2 CAPSULE ORAL 3 TIMES DAILY PRN
COMMUNITY

## 2018-07-16 RX ORDER — ACETAMINOPHEN 500 MG
500 TABLET ORAL EVERY 6 HOURS PRN
COMMUNITY

## 2018-07-16 NOTE — PROGRESS NOTES
tablet Take 1,000 mg by mouth 2 times daily (with meals)  1/21/18  Yes Historical Provider, MD   insulin glargine (LANTUS) 100 UNIT/ML injection vial Inject 25 Units into the skin 2 times daily 1/21/18   Historical Provider, MD   insulin regular (HUMULIN R;NOVOLIN R) 100 UNIT/ML injection Inject 12 Units into the skin 3 times daily (before meals) 1/21/18   Historical Provider, MD   CPAP Machine MISC by Does not apply route nightly    Historical Provider, MD       Social History   Substance Use Topics    Smoking status: Never Smoker    Smokeless tobacco: Never Used    Alcohol use No       Review of Systems: All 12 systems reviewed and pertinent positives noted above. Lower Extremity Physical Examination:     Vitals:   Vitals:    07/16/18 1332   BP: (!) 141/82   Pulse: 86   Temp: 98.8 °F (37.1 °C)     General: AAO x 3 in NAD. Vascular: DP and PT pulses palpable 2/4, bilateral.  CFT <3 seconds, bilateral.  Hair growth present to the level of the digits, bilateral.  Edema present, bilateral.  Varicosities present, bilateral. Erythema absent, bilateral. Distal Rubor absent bilateral.  Temperature within normal limits bilateral. Hyperpigmentation present bilateral. Atrophic skin yes. Neurological: Sensation Impaired to light touch to level of digits, bilateral.  Protective sensation intact  10/10 sites via 5.07/10g [place]-Tyrese Monofilament, bilateral.  negative Tinel's, bilateral.  negative Valleix sign, bilateral.  Vibratory abnormal  bilateral.  Reflexes Decreased bilateral.  Paresthesias positive. Dysthesias negative. Sharp/dull intact bilateral.  Musculoskeletal: Muscle strength 5/5, bilateral.  Pain absent upon palpation bilateral. Normal medial longitudinal arch, bilateral.  Ankle ROM within normal limits,bilateral.  1st MPJ ROM within normal limits, bilateral.  Dorsally contracted digits absent. No other foot deformities. Integument:   Open lesion present, Right.   Ulcer plantar R heel, positive

## 2018-07-18 ENCOUNTER — TELEPHONE (OUTPATIENT)
Dept: WOUND CARE | Age: 33
End: 2018-07-18

## 2018-07-23 ENCOUNTER — OFFICE VISIT (OUTPATIENT)
Dept: WOUND CARE | Age: 33
End: 2018-07-23
Payer: COMMERCIAL

## 2018-07-23 VITALS — TEMPERATURE: 98.8 F | SYSTOLIC BLOOD PRESSURE: 133 MMHG | DIASTOLIC BLOOD PRESSURE: 84 MMHG | HEART RATE: 90 BPM

## 2018-07-23 DIAGNOSIS — L97.411: Primary | ICD-10-CM

## 2018-07-23 PROCEDURE — 97597 DBRDMT OPN WND 1ST 20 CM/<: CPT | Performed by: PODIATRIST

## 2018-07-23 PROCEDURE — 99999 PR OFFICE/OUTPT VISIT,PROCEDURE ONLY: CPT | Performed by: PODIATRIST

## 2018-07-23 NOTE — PROGRESS NOTES
Historical Provider, MD   albuterol sulfate  (90 BASE) MCG/ACT inhaler Inhale 2 puffs into the lungs every 6 hours as needed for Wheezing   Yes Historical Provider, MD   CPAP Machine MISC by Does not apply route nightly   Yes Historical Provider, MD   metFORMIN (GLUCOPHAGE) 500 MG tablet Take 1,000 mg by mouth 2 times daily (with meals)  1/21/18  Yes Historical Provider, MD       Social History   Substance Use Topics    Smoking status: Never Smoker    Smokeless tobacco: Never Used    Alcohol use No       Review of Systems: All 12 systems reviewed and pertinent positives noted above. Lower Extremity Physical Examination:     Vitals:   Vitals:    07/23/18 1405   BP: 133/84   Pulse: 90   Temp: 98.8 °F (37.1 °C)     General: AAO x 3 in NAD. Vascular: DP and PT pulses palpable 2/4, bilateral.  CFT <3 seconds, bilateral.  Hair growth present to the level of the digits, bilateral.  Edema present, bilateral.  Varicosities present, bilateral. Erythema absent, bilateral. Distal Rubor absent bilateral.  Temperature within normal limits bilateral. Hyperpigmentation present bilateral. Atrophic skin yes. Neurological: Sensation Impaired to light touch to level of digits, bilateral.  Protective sensation intact  10/10 sites via 5.07/10g [place]-Tyrese Monofilament, bilateral.  negative Tinel's, bilateral.  negative Valleix sign, bilateral.  Vibratory abnormal  bilateral.  Reflexes Decreased bilateral.  Paresthesias positive. Dysthesias negative. Sharp/dull intact bilateral.  Musculoskeletal: Muscle strength 5/5, bilateral.  Pain absent upon palpation bilateral. Normal medial longitudinal arch, bilateral.  Ankle ROM within normal limits,bilateral.  1st MPJ ROM within normal limits, bilateral.  Dorsally contracted digits absent. No other foot deformities. Integument:   Open lesion present, Right. Ulcer plantar R heel, positive fibrin, healthy red granular base, no probing no undermining no malodor.   No surrounding signs of infx. Interdigital maceration absent to web spaces , Bilateral.  Nails thickened, dystrophic and crumbly, discolored with subungual debris. Fissures absent, Bilateral. Hyperkeratotic tissue is absent  Wound 07/16/18 #1 right heel wound (Active)   Wound Type Wound 7/16/2018  1:45 PM   Dressing/Treatment Foam 7/16/2018  1:53 PM   Wound Cleansed Rinsed/Irrigated with saline 7/16/2018  1:45 PM   Wound Length (cm) 1 cm 7/23/2018  2:02 PM   Wound Width (cm) 1.9 cm 7/23/2018  2:02 PM   Wound Depth (cm)  0.1 7/23/2018  2:02 PM   Calculated Wound Size (cm^2) (l*w) 1.9 cm^2 7/23/2018  2:02 PM   Change in Wound Size % (l*w) 4.04 7/23/2018  2:02 PM   Drainage Amount Small 7/23/2018  2:02 PM   Drainage Description Serosanguinous 7/23/2018  2:02 PM   Odor None 7/23/2018  2:02 PM   Margins Defined edges 7/16/2018  1:45 PM   Galilea-wound Assessment Dry 7/23/2018  2:02 PM   [place]%Wound Bed 100 7/23/2018  2:02 PM   Red%Wound Bed 100 7/16/2018  1:53 PM   Yellow%Wound Bed 5 7/16/2018  1:45 PM   Number of days: 7             Asessment: Patient is a 28 y.o. male with:    Diagnosis Orders   1. Lower limb ulcer, heel or midfoot, right, limited to breakdown of skin (HCC)  CO DEBRIDEMENT OPEN WOUND 20 SQ CM<     Ulcer Classification:  R heel ulcer full thickness grade 1 C  IDSA  no infection. Plan:   Patient examined and evaluated. Current condition and treatment options discussed in detail. Dressing: foam  Active wound management took place 100% non excisional with the use of  tissue nippers. All non viable tissue (including epidermis and/or dermis) and bio burden was removed to promote healing. Please see chart for exact measurements, if not documented then size was less than 20 sq cm.  continue Heel lift offloading boot to wear at all times to relieve pressure from heel. Pt was educated on importance of this and how this can facilitate healing. Information was given on how to order this device.   Contact clinic with any questions/problems/concerns or new signs of infection.  Follow-up at [facility] in 1 week

## 2018-07-24 ENCOUNTER — TELEPHONE (OUTPATIENT)
Dept: WOUND CARE | Age: 33
End: 2018-07-24

## 2018-07-24 NOTE — TELEPHONE ENCOUNTER
Faxed AVS with Wound Care instructions and Progress Note to [address] on 7/23/18.

## 2018-08-06 ENCOUNTER — OFFICE VISIT (OUTPATIENT)
Dept: WOUND CARE | Age: 33
End: 2018-08-06
Payer: COMMERCIAL

## 2018-08-06 VITALS — TEMPERATURE: 98 F | DIASTOLIC BLOOD PRESSURE: 86 MMHG | HEART RATE: 103 BPM | SYSTOLIC BLOOD PRESSURE: 126 MMHG

## 2018-08-06 DIAGNOSIS — L97.411 SKIN ULCER OF RIGHT HEEL, LIMITED TO BREAKDOWN OF SKIN (HCC): Primary | ICD-10-CM

## 2018-08-06 DIAGNOSIS — R60.0 EDEMA LEG: ICD-10-CM

## 2018-08-06 PROCEDURE — 97597 DBRDMT OPN WND 1ST 20 CM/<: CPT | Performed by: PODIATRIST

## 2018-08-06 PROCEDURE — 99999 PR OFFICE/OUTPT VISIT,PROCEDURE ONLY: CPT | Performed by: PODIATRIST

## 2018-08-06 NOTE — PROGRESS NOTES
base, no probing no undermining no malodor. No surrounding signs of infx. Interdigital maceration absent to web spaces , Bilateral.  Nails thickened, dystrophic and crumbly, discolored with subungual debris. Fissures absent, Bilateral. Hyperkeratotic tissue is absent  Wound 07/16/18 #1 right heel wound (Active)   Wound Type Wound 7/16/2018  1:45 PM   Dressing/Treatment Foam 7/16/2018  1:53 PM   Wound Cleansed Rinsed/Irrigated with saline 7/16/2018  1:45 PM   Wound Length (cm) 1.5 cm 8/6/2018 11:29 AM   Wound Width (cm) 1 cm 8/6/2018 11:29 AM   Wound Depth (cm)  0.1 8/6/2018 11:29 AM   Calculated Wound Size (cm^2) (l*w) 1.5 cm^2 8/6/2018 11:29 AM   Change in Wound Size % (l*w) 24.24 8/6/2018 11:29 AM   Drainage Amount Small 8/6/2018 11:29 AM   Drainage Description Serosanguinous 8/6/2018 11:29 AM   Odor None 8/6/2018 11:29 AM   Margins Defined edges 7/16/2018  1:45 PM   Galilea-wound Assessment Dry 7/23/2018  2:02 PM   [place]%Wound Bed 100 8/6/2018 11:29 AM   Red%Wound Bed 100 7/16/2018  1:53 PM   Yellow%Wound Bed 5 7/16/2018  1:45 PM   Number of days: 21             Asessment: Patient is a 28 y.o. male with:    Diagnosis Orders   1. Skin ulcer of right heel, limited to breakdown of skin (HCC)  HI DEBRIDEMENT OPEN WOUND 20 SQ CM<   2. Edema leg  HI DEBRIDEMENT OPEN WOUND 20 SQ CM<     Ulcer Classification:  R heel ulcer full thickness grade 1 C  IDSA  no infection. Plan:   Patient examined and evaluated. Current condition and treatment options discussed in detail. Dressing: foam  Active wound management took place 100% non excisional with the use of  tissue nippers. All non viable tissue (including epidermis and/or dermis) and bio burden was removed to promote healing. Please see chart for exact measurements, if not documented then size was less than 20 sq cm.  continue Heel lift offloading boot to wear at all times to relieve pressure from heel.   Pt was educated on importance of this and how this can facilitate healing. Information was given on how to order this device. Pt Was extremely upset at this appointment. Patient was verbally abusive to staff. Security was called which only then the patient settled down slightly. Patient was extremely upset his leg was red and would not change the wrap he had an placed on the right leg. Patient was advised that he needs to not use the wrap is in place only on the proximal portion the right leg leading to distal swollen erythema along swelling out the hole that was in the. Patient Stating he was told to wear that but cannot tell us who gave the order. Patient was educated in potential risk if he would continue to wear that device  Contact clinic with any questions/problems/concerns or new signs of infection.  Follow-up at [facility] in 1 week

## 2018-08-07 ENCOUNTER — TELEPHONE (OUTPATIENT)
Dept: WOUND CARE | Age: 33
End: 2018-08-07

## 2018-08-13 ENCOUNTER — OFFICE VISIT (OUTPATIENT)
Dept: WOUND CARE | Age: 33
End: 2018-08-13
Payer: COMMERCIAL

## 2018-08-13 VITALS
TEMPERATURE: 97.6 F | DIASTOLIC BLOOD PRESSURE: 80 MMHG | OXYGEN SATURATION: 97 % | SYSTOLIC BLOOD PRESSURE: 130 MMHG | HEART RATE: 89 BPM

## 2018-08-13 DIAGNOSIS — L97.411 SKIN ULCER OF RIGHT HEEL, LIMITED TO BREAKDOWN OF SKIN (HCC): Primary | ICD-10-CM

## 2018-08-13 DIAGNOSIS — E11.51 CONTROLLED TYPE 2 DM WITH PERIPHERAL CIRCULATORY DISORDER (HCC): ICD-10-CM

## 2018-08-13 PROCEDURE — 99999 PR OFFICE/OUTPT VISIT,PROCEDURE ONLY: CPT | Performed by: PODIATRIST

## 2018-08-13 PROCEDURE — 97597 DBRDMT OPN WND 1ST 20 CM/<: CPT | Performed by: PODIATRIST

## 2018-08-13 NOTE — PATIENT INSTRUCTIONS
Continue foam dressing to right heel wound, change on Wednesday, and Friday, and as needed for soiling or dislodged. May remove dressing to shower, cleanse wound with mild soap & water, rinse well & pat dry gently, then apply clean dressing. Follow up with Dr. Edgardo Barr in 1 week. SIGNS OF INFECTION  - Redness, swelling, skin hot  - Wound bed turns black or stringy yellow  - Foul odor  - Increased drainage or pus  - Increased pain  - Fever greater than 100F    CALL YOUR DOCTOR OR SEEK MEDICAL ATTENTION IF SIGNS OF INFECTION.   DO NOT WAIT UNTIL YOUR NEXT APPOINTMENT    Call the Wound Care Nurse with any other questions or concerns- [phone]

## 2018-08-14 ENCOUNTER — TELEPHONE (OUTPATIENT)
Dept: WOUND CARE | Age: 33
End: 2018-08-14

## 2018-08-20 ENCOUNTER — OFFICE VISIT (OUTPATIENT)
Dept: WOUND CARE | Age: 33
End: 2018-08-20
Payer: MEDICARE

## 2018-08-20 VITALS — SYSTOLIC BLOOD PRESSURE: 118 MMHG | TEMPERATURE: 97.2 F | DIASTOLIC BLOOD PRESSURE: 88 MMHG | HEART RATE: 85 BPM

## 2018-08-20 DIAGNOSIS — L97.411 SKIN ULCER OF RIGHT HEEL, LIMITED TO BREAKDOWN OF SKIN (HCC): Primary | ICD-10-CM

## 2018-08-20 DIAGNOSIS — E11.49 DM (DIABETES MELLITUS), TYPE 2 WITH NEUROLOGICAL COMPLICATIONS (HCC): ICD-10-CM

## 2018-08-20 PROCEDURE — 99999 PR OFFICE/OUTPT VISIT,PROCEDURE ONLY: CPT | Performed by: PODIATRIST

## 2018-08-20 PROCEDURE — 15275 SKIN SUB GRAFT FACE/NK/HF/G: CPT | Performed by: PODIATRIST

## 2018-08-20 NOTE — PROGRESS NOTES
Subjective:    Aurora Calvo is a 28 y.o. male who presents with the ulceration of the R heel. Patient relates pain is Absent . Pain is rated 0 out of 10 and is described as none. Currently denies F/C/N/V. No Known Allergies    Past Medical History:   Diagnosis Date    Diabetes mellitus ([facility] [address]     Hyperlipidemia     Prader-Willi syndrome     Sleep apnea        Prior to Admission medications    Medication Sig Start Date End Date Taking?  Authorizing Provider   loperamide (IMODIUM) 2 MG capsule Take 2 mg by mouth 3 times daily as needed for Diarrhea   Yes Historical Provider, MD   acetaminophen (TYLENOL) 500 MG tablet Take 500 mg by mouth every 6 hours as needed for Pain or Fever (q 4-6 hours prn)   Yes Historical Provider, MD   insulin detemir (LEVEMIR FLEXTOUCH) 100 UNIT/ML injection pen Inject 45 Units into the skin 2 times daily 4/10/18  Yes Historical Provider, MD   insulin aspart (NOVOLOG) 100 UNIT/ML injection vial Inject into the skin 3 times daily (before meals)   Yes Historical Provider, MD   carBAMazepine (TEGRETOL XR) 200 MG extended release tablet Take 200 mg by mouth 2 times daily 3/1/18  Yes Historical Provider, MD   insulin glargine (LANTUS) 100 UNIT/ML injection vial Inject 25 Units into the skin 2 times daily 1/21/18  Yes Historical Provider, MD   insulin regular (HUMULIN R;NOVOLIN R) 100 UNIT/ML injection Inject 12 Units into the skin 3 times daily (before meals) 1/21/18  Yes Historical Provider, MD   levothyroxine (SYNTHROID) 50 MCG tablet Take 50 mcg by mouth Daily 1/21/18  Yes Historical Provider, MD   oxybutynin (DITROPAN-XL) 5 MG extended release tablet Take 5 mg by mouth daily 1/21/18  Yes Historical Provider, MD   pioglitazone (ACTOS) 15 MG tablet Take 15 mg by mouth daily 1/21/18  Yes Historical Provider, MD   rosuvastatin (CRESTOR) 40 MG tablet Take 40 mg by mouth every evening 1/21/18  Yes Historical Provider, MD   albuterol sulfate  (90 BASE) MCG/ACT inhaler Inhale 2 puffs Nails thickened, dystrophic and crumbly, discolored with subungual debris. Fissures absent, Bilateral. Hyperkeratotic tissue is absent  Wound 07/16/18 #1 right heel wound (Active)   Wound Type Wound 7/16/2018  1:45 PM   Dressing/Treatment Foam 7/16/2018  1:53 PM   Wound Cleansed Rinsed/Irrigated with saline 7/16/2018  1:45 PM   Wound Length (cm) 0.8 cm 8/20/2018  1:22 PM   Wound Width (cm) 0.6 cm 8/20/2018  1:22 PM   Wound Depth (cm)  0.2 8/20/2018  1:22 PM   Calculated Wound Size (cm^2) (l*w) 0.48 cm^2 8/20/2018  1:22 PM   Change in Wound Size % (l*w) 75.76 8/20/2018  1:22 PM   Drainage Amount Small 8/20/2018  1:22 PM   Drainage Description Serosanguinous 8/20/2018  1:22 PM   Odor None 8/20/2018  1:22 PM   Margins Defined edges 7/16/2018  1:45 PM   Galilea-wound Assessment Dry 7/23/2018  2:02 PM   [place]%Wound Bed 100 8/20/2018  1:22 PM   Red%Wound Bed 100 7/16/2018  1:53 PM   Yellow%Wound Bed 5 7/16/2018  1:45 PM   Number of days: 35         Asessment: Patient is a 28 y.o. male with:    Diagnosis Orders   1. Skin ulcer of right heel, limited to breakdown of skin (HCC)  ND APLIGRAF    ND SUB GRFT F/S/N/H/F/G/M/D /<100SCM /<1ST 25 SCM   2. DM (diabetes mellitus), type 2 with neurological complications (HCC)  ND APLIGRAF    ND SUB GRFT F/S/N/H/F/G/M/D /<100SCM /<1ST 25 SCM     Ulcer Classification:  R heel ulcer full thickness grade 1 C  IDSA  no infection. Plan:  Patient presents today for the first Apligraf application. Sharp excisional debridement of the ulcer took place for preparation of the wound bed. Hemostasis was achieved. Apligraf was checked and good pH. Apligraf was prepped and placed on the ulceration. 25% was used and 75% wasted. Please see chart for exact ulcer measurements. Apligraf was then secured in place using wound veil and steri strips. Dressing took place with mineral oil gauze, dry gauze, kiley, and coban. Patient will continue offloading as advised.   Patient will follow up in 1

## 2018-08-21 ENCOUNTER — TELEPHONE (OUTPATIENT)
Dept: WOUND CARE | Age: 33
End: 2018-08-21

## 2018-08-21 NOTE — TELEPHONE ENCOUNTER
Faxed AVS with Wound Care instructions and Progress Note to [address] on 8/20/18.

## 2018-08-27 ENCOUNTER — TELEPHONE (OUTPATIENT)
Dept: WOUND CARE | Age: 33
End: 2018-08-27

## 2018-08-27 ENCOUNTER — OFFICE VISIT (OUTPATIENT)
Dept: WOUND CARE | Age: 33
End: 2018-08-27
Payer: MEDICARE

## 2018-08-27 VITALS — SYSTOLIC BLOOD PRESSURE: 149 MMHG | HEART RATE: 88 BPM | TEMPERATURE: 97.2 F | DIASTOLIC BLOOD PRESSURE: 74 MMHG

## 2018-08-27 DIAGNOSIS — E11.49 DM (DIABETES MELLITUS), TYPE 2 WITH NEUROLOGICAL COMPLICATIONS (HCC): ICD-10-CM

## 2018-08-27 DIAGNOSIS — L97.411 SKIN ULCER OF RIGHT HEEL, LIMITED TO BREAKDOWN OF SKIN (HCC): Primary | ICD-10-CM

## 2018-08-27 PROCEDURE — 15275 SKIN SUB GRAFT FACE/NK/HF/G: CPT | Performed by: PODIATRIST

## 2018-08-27 PROCEDURE — 99999 PR OFFICE/OUTPT VISIT,PROCEDURE ONLY: CPT | Performed by: PODIATRIST

## 2018-08-27 NOTE — TELEPHONE ENCOUNTER
Faxed AVS with Wound Care instructions and Progress Note to [address].

## 2018-08-27 NOTE — PROGRESS NOTES
Subjective:    Edelmiro Dandy is a 28 y.o. male who presents with the ulceration of the R heel. Patient relates pain is Absent . Pain is rated 0 out of 10 and is described as none. Currently denies F/C/N/V. No Known Allergies    Past Medical History:   Diagnosis Date    Diabetes mellitus ([facility] [address]     Hyperlipidemia     Prader-Willi syndrome     Sleep apnea        Prior to Admission medications    Medication Sig Start Date End Date Taking?  Authorizing Provider   loperamide (IMODIUM) 2 MG capsule Take 2 mg by mouth 3 times daily as needed for Diarrhea   Yes Historical Provider, MD   acetaminophen (TYLENOL) 500 MG tablet Take 500 mg by mouth every 6 hours as needed for Pain or Fever (q 4-6 hours prn)   Yes Historical Provider, MD   insulin detemir (LEVEMIR FLEXTOUCH) 100 UNIT/ML injection pen Inject 45 Units into the skin 2 times daily 4/10/18  Yes Historical Provider, MD   insulin aspart (NOVOLOG) 100 UNIT/ML injection vial Inject into the skin 3 times daily (before meals)   Yes Historical Provider, MD   carBAMazepine (TEGRETOL XR) 200 MG extended release tablet Take 200 mg by mouth 2 times daily 3/1/18  Yes Historical Provider, MD   insulin glargine (LANTUS) 100 UNIT/ML injection vial Inject 25 Units into the skin 2 times daily 1/21/18  Yes Historical Provider, MD   insulin regular (HUMULIN R;NOVOLIN R) 100 UNIT/ML injection Inject 12 Units into the skin 3 times daily (before meals) 1/21/18  Yes Historical Provider, MD   levothyroxine (SYNTHROID) 50 MCG tablet Take 50 mcg by mouth Daily 1/21/18  Yes Historical Provider, MD   oxybutynin (DITROPAN-XL) 5 MG extended release tablet Take 5 mg by mouth daily 1/21/18  Yes Historical Provider, MD   pioglitazone (ACTOS) 15 MG tablet Take 15 mg by mouth daily 1/21/18  Yes Historical Provider, MD   rosuvastatin (CRESTOR) 40 MG tablet Take 40 mg by mouth every evening 1/21/18  Yes Historical Provider, MD   albuterol sulfate  (90 BASE) MCG/ACT inhaler Inhale 2 puffs into the lungs every 6 hours as needed for Wheezing   Yes Historical Provider, MD   CPAP Machine MISC by Does not apply route nightly   Yes Historical Provider, MD   metFORMIN (GLUCOPHAGE) 500 MG tablet Take 1,000 mg by mouth 2 times daily (with meals)  1/21/18  Yes Historical Provider, MD       Social History   Substance Use Topics    Smoking status: Never Smoker    Smokeless tobacco: Never Used    Alcohol use No       Review of Systems: All 12 systems reviewed and pertinent positives noted above. Lower Extremity Physical Examination:     Vitals:   Vitals:    08/27/18 1301   BP: (!) 149/74   Pulse: 88   Temp: 97.2 °F (36.2 °C)     General: AAO x 3 in NAD. Vascular: DP and PT pulses palpable 2/4, bilateral.  CFT <3 seconds, bilateral.  Hair growth present to the level of the digits, bilateral.  Edema present, bilateral.  Varicosities present, bilateral. Erythema absent, bilateral. Distal Rubor absent bilateral.  Temperature within normal limits bilateral. Hyperpigmentation present bilateral. Atrophic skin yes. Neurological: Sensation Impaired to light touch to level of digits, bilateral.  Protective sensation intact  10/10 sites via 5.07/10g [place]-Tyrese Monofilament, bilateral.  negative Tinel's, bilateral.  negative Valleix sign, bilateral.  Vibratory abnormal  bilateral.  Reflexes Decreased bilateral.  Paresthesias positive. Dysthesias negative. Sharp/dull intact bilateral.  Musculoskeletal: Muscle strength 5/5, bilateral.  Pain absent upon palpation bilateral. Normal medial longitudinal arch, bilateral.  Ankle ROM within normal limits,bilateral.  1st MPJ ROM within normal limits, bilateral.  Dorsally contracted digits absent. No other foot deformities. Integument:   Open lesion present, Right. Ulcer plantar R heel,  positive fibrin, healthy red granular base, no probing no undermining no malodor. No surrounding signs of infx.   Interdigital maceration absent to web spaces , Bilateral.  Nails thickened, dystrophic and crumbly, discolored with subungual debris. Fissures absent, Bilateral. Hyperkeratotic tissue is absent  Wound 07/16/18 #1 right heel wound (Active)   Wound Type Wound 8/27/2018  1:03 PM   Wound Diabetic 8/27/2018  1:03 PM   Dressing/Treatment Foam 7/16/2018  1:53 PM   Wound Cleansed Rinsed/Irrigated with saline 8/27/2018  1:03 PM   Wound Length (cm) 1.3 cm 8/27/2018  1:03 PM   Wound Width (cm) 0.9 cm 8/27/2018  1:03 PM   Wound Depth (cm)  0.2 8/27/2018  1:03 PM   Calculated Wound Size (cm^2) (l*w) 1.17 cm^2 8/27/2018  1:03 PM   Change in Wound Size % (l*w) 40.91 8/27/2018  1:03 PM   Wound Assessment Pink 8/27/2018  1:03 PM   Drainage Amount Moderate 8/27/2018  1:03 PM   Drainage Description Serous 8/27/2018  1:03 PM   Odor None 8/27/2018  1:03 PM   Margins Attached edges; Defined edges 8/27/2018  1:03 PM   Galilea-wound Assessment Dry;Calloused 8/27/2018  1:03 PM   Non-staged Wound Description Full thickness 8/27/2018  1:03 PM   [place]%Wound Bed 100 8/20/2018  1:22 PM   Red%Wound Bed 100 8/20/2018  1:35 PM   Yellow%Wound Bed 5 7/16/2018  1:45 PM   Number of days: 42         Asessment: Patient is a 28 y.o. male with:    Diagnosis Orders   1. Skin ulcer of right heel, limited to breakdown of skin (HCC)  OR SUB GRFT F/S/N/H/F/G/M/D /<100SCM /<1ST 25 SCM    OR APLIGRAF   2. DM (diabetes mellitus), type 2 with neurological complications (HCC)  OR SUB GRFT F/S/N/H/F/G/M/D /<100SCM /<1ST 25 SCM    OR APLIGRAF     Ulcer Classification:  R heel ulcer full thickness grade 1 C  IDSA  no infection. Plan:  Patient presents today for the 2nd Apligraf application. Sharp excisional debridement of the ulcer took place for preparation of the wound bed. Hemostasis was achieved. Apligraf was checked and good pH. Apligraf was prepped and placed on the ulceration. 25% was used and 75% wasted. Please see chart for exact ulcer measurements. Apligraf was then secured in place using wound veil and steri strips.

## 2018-09-04 ENCOUNTER — OFFICE VISIT (OUTPATIENT)
Dept: WOUND CARE | Age: 33
End: 2018-09-04
Payer: MEDICARE

## 2018-09-04 VITALS
RESPIRATION RATE: 20 BRPM | TEMPERATURE: 99 F | SYSTOLIC BLOOD PRESSURE: 120 MMHG | HEART RATE: 94 BPM | BODY MASS INDEX: 48.62 KG/M2 | WEIGHT: 315 LBS | DIASTOLIC BLOOD PRESSURE: 70 MMHG

## 2018-09-04 DIAGNOSIS — L97.411 SKIN ULCER OF RIGHT HEEL, LIMITED TO BREAKDOWN OF SKIN (HCC): Primary | ICD-10-CM

## 2018-09-04 DIAGNOSIS — E11.49 DM (DIABETES MELLITUS), TYPE 2 WITH NEUROLOGICAL COMPLICATIONS (HCC): ICD-10-CM

## 2018-09-04 PROCEDURE — 15275 SKIN SUB GRAFT FACE/NK/HF/G: CPT | Performed by: PODIATRIST

## 2018-09-04 PROCEDURE — 99999 PR OFFICE/OUTPT VISIT,PROCEDURE ONLY: CPT | Performed by: PODIATRIST

## 2018-09-04 NOTE — PROGRESS NOTES
needed for Wheezing    Historical Provider, MD   CPAP Machine MISC by Does not apply route nightly    Historical Provider, MD   metFORMIN (GLUCOPHAGE) 500 MG tablet Take 1,000 mg by mouth 2 times daily (with meals)  1/21/18   Historical Provider, MD       Social History   Substance Use Topics    Smoking status: Never Smoker    Smokeless tobacco: Never Used    Alcohol use No       Review of Systems: All 12 systems reviewed and pertinent positives noted above. Lower Extremity Physical Examination:     Vitals:   Vitals:    09/04/18 1317   BP: 120/70   Pulse:    Resp:    Temp: 99 °F (37.2 °C)     General: AAO x 3 in NAD. Vascular: DP and PT pulses palpable 2/4, bilateral.  CFT <3 seconds, bilateral.  Hair growth present to the level of the digits, bilateral.  Edema present, bilateral.  Varicosities present, bilateral. Erythema absent, bilateral. Distal Rubor absent bilateral.  Temperature within normal limits bilateral. Hyperpigmentation present bilateral. Atrophic skin yes. Neurological: Sensation Impaired to light touch to level of digits, bilateral.  Protective sensation intact  10/10 sites via 5.07/10g [place]-Tyrese Monofilament, bilateral.  negative Tinel's, bilateral.  negative Valleix sign, bilateral.  Vibratory abnormal  bilateral.  Reflexes Decreased bilateral.  Paresthesias positive. Dysthesias negative. Sharp/dull intact bilateral.  Musculoskeletal: Muscle strength 5/5, bilateral.  Pain absent upon palpation bilateral. Normal medial longitudinal arch, bilateral.  Ankle ROM within normal limits,bilateral.  1st MPJ ROM within normal limits, bilateral.  Dorsally contracted digits absent. No other foot deformities. Integument:   Open lesion present, Right. Ulcer plantar R heel,  positive fibrin, healthy red granular base, maceration on edges,  no probing no undermining no malodor. No surrounding signs of infx.   Interdigital maceration absent to web spaces , Bilateral.  Nails thickened, dystrophic and crumbly, discolored with subungual debris. Fissures absent, Bilateral. Hyperkeratotic tissue is absent  Wound 07/16/18 #1 right heel wound (Active)   Wound Type Wound 9/4/2018  1:13 PM   Wound Diabetic 9/4/2018  1:13 PM   Dressing/Treatment Foam 7/16/2018  1:53 PM   Wound Cleansed Rinsed/Irrigated with saline 9/4/2018  1:13 PM   Wound Length (cm) 0.9 cm 9/4/2018  1:13 PM   Wound Width (cm) 1.1 cm 9/4/2018  1:13 PM   Wound Depth (cm)  0.1 9/4/2018  1:13 PM   Calculated Wound Size (cm^2) (l*w) 0.99 cm^2 9/4/2018  1:13 PM   Change in Wound Size % (l*w) 50 9/4/2018  1:13 PM   Wound Assessment Pink 9/4/2018  1:13 PM   Drainage Amount Moderate 9/4/2018  1:13 PM   Drainage Description Serous 9/4/2018  1:13 PM   Odor Mild 9/4/2018  1:13 PM   Margins Attached edges; Defined edges 9/4/2018  1:13 PM   Galilea-wound Assessment Maceration 9/4/2018  1:13 PM   Non-staged Wound Description Full thickness 9/4/2018  1:13 PM   [place]%Wound Bed 100 8/20/2018  1:22 PM   Red%Wound Bed 100 8/20/2018  1:35 PM   Yellow%Wound Bed 5 7/16/2018  1:45 PM   Number of days: 50         Asessment: Patient is a 28 y.o. male with:    Diagnosis Orders   1. Skin ulcer of right heel, limited to breakdown of skin (HCC)  [place] SUB GRFT F/S/N/H/F/G/M/D /<100SCM /<1ST 25 SCM    AZ APLIGRAF   2. DM (diabetes mellitus), type 2 with neurological complications (HCC)  AZ SUB GRFT F/S/N/H/F/G/M/D /<100SCM /<1ST 25 SCM    AZ APLIGRAF     Ulcer Classification:  R heel ulcer full thickness grade 1 C  IDSA  no infection. Plan:  Patient presents today for the 3rd Apligraf application. Sharp excisional debridement of the ulcer took place for preparation of the wound bed. Hemostasis was achieved. Apligraf was checked and good pH. Apligraf was prepped and placed on the ulceration. 25% was used and 75% wasted. Please see chart for exact ulcer measurements. Apligraf was then secured in place using wound veil and steri strips.   Dressing took place with mineral oil gauze, dry gauze, kiley, and coban. Outer dressing will be changed daily due to maceration. Patient will continue offloading as advised. Patient will follow up in 1 week. Contact clinic with any questions/problems/concerns or new signs of infection. Lew montano

## 2018-09-05 ENCOUNTER — TELEPHONE (OUTPATIENT)
Dept: WOUND CARE | Age: 33
End: 2018-09-05

## 2018-09-05 NOTE — TELEPHONE ENCOUNTER
Faxed AVS with Wound Care instructions and Progress Note to [address].

## 2018-09-10 ENCOUNTER — OFFICE VISIT (OUTPATIENT)
Dept: WOUND CARE | Age: 33
End: 2018-09-10
Payer: MEDICARE

## 2018-09-10 VITALS — HEART RATE: 88 BPM | SYSTOLIC BLOOD PRESSURE: 166 MMHG | DIASTOLIC BLOOD PRESSURE: 100 MMHG | TEMPERATURE: 97.9 F

## 2018-09-10 DIAGNOSIS — E11.49 DM (DIABETES MELLITUS), TYPE 2 WITH NEUROLOGICAL COMPLICATIONS (HCC): ICD-10-CM

## 2018-09-10 DIAGNOSIS — L97.421 SKIN ULCER OF LEFT HEEL, LIMITED TO BREAKDOWN OF SKIN (HCC): Primary | ICD-10-CM

## 2018-09-10 PROCEDURE — 15275 SKIN SUB GRAFT FACE/NK/HF/G: CPT | Performed by: PODIATRIST

## 2018-09-10 PROCEDURE — 99999 PR OFFICE/OUTPT VISIT,PROCEDURE ONLY: CPT | Performed by: PODIATRIST

## 2018-09-10 NOTE — PATIENT INSTRUCTIONS
Apligraf #4  placed to right heel wound today. Dressing must stay dry. Nursing staff to change \"outer\" dressing of calcium alginate and absorbent dressing (ABD, kerlix, and Coban) daily to manage wound exudate. Continue heel offloading. Follow up with Dr. Guy Barton in 1 week. Call clinic for questions or concerns. SIGNS OF INFECTION  - Redness, swelling, skin hot  - Wound bed turns black or stringy yellow  - Foul odor  - Increased drainage or pus  - Increased pain  - Fever greater than 100F     CALL YOUR DOCTOR OR SEEK MEDICAL ATTENTION IF SIGNS OF INFECTION. DO NOT WAIT UNTIL YOUR NEXT APPOINTMENT     Call the Wound Care Nurse with any other questions or concerns- [phone].

## 2018-09-10 NOTE — PROGRESS NOTES
into the lungs every 6 hours as needed for Wheezing   Yes Historical Provider, MD   CPAP Machine MISC by Does not apply route nightly   Yes Historical Provider, MD   metFORMIN (GLUCOPHAGE) 500 MG tablet Take 1,000 mg by mouth 2 times daily (with meals)  1/21/18  Yes Historical Provider, MD       Social History   Substance Use Topics    Smoking status: Never Smoker    Smokeless tobacco: Never Used    Alcohol use No       Review of Systems: All 12 systems reviewed and pertinent positives noted above. Lower Extremity Physical Examination:     Vitals:   Vitals:    09/10/18 1316   BP: (!) 166/100   Pulse:    Temp:      General: AAO x 3 in NAD. Vascular: DP and PT pulses palpable 2/4, bilateral.  CFT <3 seconds, bilateral.  Hair growth present to the level of the digits, bilateral.  Edema present, bilateral.  Varicosities present, bilateral. Erythema absent, bilateral. Distal Rubor absent bilateral.  Temperature within normal limits bilateral. Hyperpigmentation present bilateral. Atrophic skin yes. Neurological: Sensation Impaired to light touch to level of digits, bilateral.  Protective sensation intact  10/10 sites via 5.07/10g [place]-Tyrese Monofilament, bilateral.  negative Tinel's, bilateral.  negative Valleix sign, bilateral.  Vibratory abnormal  bilateral.  Reflexes Decreased bilateral.  Paresthesias positive. Dysthesias negative. Sharp/dull intact bilateral.  Musculoskeletal: Muscle strength 5/5, bilateral.  Pain absent upon palpation bilateral. Normal medial longitudinal arch, bilateral.  Ankle ROM within normal limits,bilateral.  1st MPJ ROM within normal limits, bilateral.  Dorsally contracted digits absent. No other foot deformities. Integument:   Open lesion present, Right. Ulcer plantar R heel,  positive fibrin, healthy red granular base, maceration on edges,  no probing no undermining no malodor. No surrounding signs of infx.   Interdigital maceration absent to web spaces , Bilateral. steri strips. Dressing took place with mineral oil gauze, dry gauze, kiley, and coban. Outer dressing will be changed daily due to maceration. Patient will continue offloading as advised. Patient will follow up in 1 week. Contact clinic with any questions/problems/concerns or new signs of infection.

## 2018-09-11 ENCOUNTER — TELEPHONE (OUTPATIENT)
Dept: WOUND CARE | Age: 33
End: 2018-09-11

## 2018-09-17 ENCOUNTER — OFFICE VISIT (OUTPATIENT)
Dept: WOUND CARE | Age: 33
End: 2018-09-17
Payer: COMMERCIAL

## 2018-09-17 VITALS — TEMPERATURE: 98.4 F | DIASTOLIC BLOOD PRESSURE: 98 MMHG | SYSTOLIC BLOOD PRESSURE: 169 MMHG | HEART RATE: 85 BPM

## 2018-09-17 DIAGNOSIS — L97.411 SKIN ULCER OF RIGHT HEEL, LIMITED TO BREAKDOWN OF SKIN (HCC): Primary | ICD-10-CM

## 2018-09-17 DIAGNOSIS — E11.49 DM (DIABETES MELLITUS), TYPE 2 WITH NEUROLOGICAL COMPLICATIONS (HCC): ICD-10-CM

## 2018-09-17 PROCEDURE — 97597 DBRDMT OPN WND 1ST 20 CM/<: CPT | Performed by: PODIATRIST

## 2018-09-17 PROCEDURE — 99999 PR OFFICE/OUTPT VISIT,PROCEDURE ONLY: CPT | Performed by: PODIATRIST

## 2018-09-18 ENCOUNTER — TELEPHONE (OUTPATIENT)
Dept: WOUND CARE | Age: 33
End: 2018-09-18

## 2018-09-24 ENCOUNTER — OFFICE VISIT (OUTPATIENT)
Dept: WOUND CARE | Age: 33
End: 2018-09-24
Payer: COMMERCIAL

## 2018-09-24 VITALS — TEMPERATURE: 98.8 F | DIASTOLIC BLOOD PRESSURE: 93 MMHG | SYSTOLIC BLOOD PRESSURE: 128 MMHG | HEART RATE: 87 BPM

## 2018-09-24 DIAGNOSIS — L97.411 SKIN ULCER OF RIGHT HEEL, LIMITED TO BREAKDOWN OF SKIN (HCC): Primary | ICD-10-CM

## 2018-09-24 DIAGNOSIS — E11.49 DM (DIABETES MELLITUS), TYPE 2 WITH NEUROLOGICAL COMPLICATIONS (HCC): ICD-10-CM

## 2018-09-24 PROCEDURE — 99999 PR OFFICE/OUTPT VISIT,PROCEDURE ONLY: CPT | Performed by: PODIATRIST

## 2018-09-24 PROCEDURE — 97597 DBRDMT OPN WND 1ST 20 CM/<: CPT | Performed by: PODIATRIST

## 2018-09-24 NOTE — PROGRESS NOTES
Subjective:    Cristi John is a 28 y.o. male who presents with the ulceration of the R heel. No pain. No issues with dressings. No n/v/f/c/d. No Known Allergies    Past Medical History:   Diagnosis Date    Diabetes mellitus ([facility] [address]     Hyperlipidemia     Prader-Willi syndrome     Sleep apnea        Prior to Admission medications    Medication Sig Start Date End Date Taking?  Authorizing Provider   loperamide (IMODIUM) 2 MG capsule Take 2 mg by mouth 3 times daily as needed for Diarrhea   Yes Historical Provider, MD   acetaminophen (TYLENOL) 500 MG tablet Take 500 mg by mouth every 6 hours as needed for Pain or Fever (q 4-6 hours prn)   Yes Historical Provider, MD   insulin detemir (LEVEMIR FLEXTOUCH) 100 UNIT/ML injection pen Inject 45 Units into the skin 2 times daily 4/10/18  Yes Historical Provider, MD   insulin aspart (NOVOLOG) 100 UNIT/ML injection vial Inject into the skin 3 times daily (before meals)   Yes Historical Provider, MD   carBAMazepine (TEGRETOL XR) 200 MG extended release tablet Take 200 mg by mouth 2 times daily 3/1/18  Yes Historical Provider, MD   insulin glargine (LANTUS) 100 UNIT/ML injection vial Inject 25 Units into the skin 2 times daily 1/21/18  Yes Historical Provider, MD   insulin regular (HUMULIN R;NOVOLIN R) 100 UNIT/ML injection Inject 12 Units into the skin 3 times daily (before meals) 1/21/18  Yes Historical Provider, MD   levothyroxine (SYNTHROID) 50 MCG tablet Take 50 mcg by mouth Daily 1/21/18  Yes Historical Provider, MD   oxybutynin (DITROPAN-XL) 5 MG extended release tablet Take 5 mg by mouth daily 1/21/18  Yes Historical Provider, MD   pioglitazone (ACTOS) 15 MG tablet Take 15 mg by mouth daily 1/21/18  Yes Historical Provider, MD   rosuvastatin (CRESTOR) 40 MG tablet Take 40 mg by mouth every evening 1/21/18  Yes Historical Provider, MD   albuterol sulfate  (90 BASE) MCG/ACT inhaler Inhale 2 puffs into the lungs every 6 hours as needed for Wheezing   Yes crumbly, discolored with subungual debris. Fissures absent, Bilateral. Hyperkeratotic tissue is absent  Wound 07/16/18 #1 right heel wound (Active)   Wound Type Wound 9/10/2018  1:24 PM   Wound Diabetic 9/10/2018  1:24 PM   Dressing/Treatment Foam 7/16/2018  1:53 PM   Wound Cleansed Rinsed/Irrigated with saline 9/10/2018  1:24 PM   Wound Length (cm) 0.5 cm 9/24/2018  1:11 PM   Wound Width (cm) 0.5 cm 9/24/2018  1:11 PM   Wound Depth (cm)  0.1 9/24/2018  1:11 PM   Calculated Wound Size (cm^2) (l*w) 0.25 cm^2 9/24/2018  1:11 PM   Change in Wound Size % (l*w) 87.37 9/24/2018  1:11 PM   Wound Assessment Steen 9/24/2018  1:05 PM   Drainage Amount Moderate 9/24/2018  1:05 PM   Drainage Description Serous 9/10/2018  1:24 PM   Odor None 9/10/2018  1:24 PM   Margins Attached edges; Defined edges 9/4/2018  1:13 PM   Galilea-wound Assessment Calloused; Maceration 9/10/2018  1:24 PM   Non-staged Wound Description Full thickness 9/10/2018  1:24 PM   [place]%Wound Bed 100 8/20/2018  1:22 PM   Red%Wound Bed 100 8/20/2018  1:35 PM   Yellow%Wound Bed 5 7/16/2018  1:45 PM   Number of days: 70         Asessment: Patient is a 28 y.o. male with:    Diagnosis Orders   1. Skin ulcer of right heel, limited to breakdown of skin (HCC)  DC DEBRIDEMENT OPEN WOUND 20 SQ CM<   2. DM (diabetes mellitus), type 2 with neurological complications (HCC)  DC DEBRIDEMENT OPEN WOUND 20 SQ CM<     Ulcer Classification:  R heel ulcer full thickness grade 1 C  IDSA  no infection. Plan: Active wound management took place 100% non excisional with the use of  curette. All non viable tissue (including epidermis and/or dermis) and bio burden was removed to promote healing. Please see chart for exact measurements, if not documented then size was less than 20 sq cm. Dressing: foam qd  Patient will continue offloading as advised. Patient will follow up in 1 week. Contact clinic with any questions/problems/concerns or new signs of infection.

## 2018-09-25 ENCOUNTER — TELEPHONE (OUTPATIENT)
Dept: WOUND CARE | Age: 33
End: 2018-09-25

## 2018-10-01 ENCOUNTER — OFFICE VISIT (OUTPATIENT)
Dept: WOUND CARE | Age: 33
End: 2018-10-01
Payer: MEDICARE

## 2018-10-01 VITALS
TEMPERATURE: 98.6 F | SYSTOLIC BLOOD PRESSURE: 134 MMHG | HEART RATE: 82 BPM | DIASTOLIC BLOOD PRESSURE: 72 MMHG | RESPIRATION RATE: 16 BRPM

## 2018-10-01 DIAGNOSIS — L97.411 SKIN ULCER OF RIGHT HEEL, LIMITED TO BREAKDOWN OF SKIN (HCC): Primary | ICD-10-CM

## 2018-10-01 PROCEDURE — 99999 PR OFFICE/OUTPT VISIT,PROCEDURE ONLY: CPT | Performed by: PODIATRIST

## 2018-10-01 PROCEDURE — 97597 DBRDMT OPN WND 1ST 20 CM/<: CPT | Performed by: PODIATRIST

## 2018-10-01 NOTE — PROGRESS NOTES
, Bilateral.  Nails thickened, dystrophic and crumbly, discolored with subungual debris. Fissures absent, Bilateral. Hyperkeratotic tissue is absent  Wound 07/16/18 #1 right heel wound (Active)   Wound Type Wound 9/10/2018  1:24 PM   Wound Diabetic 9/10/2018  1:24 PM   Dressing/Treatment Foam 7/16/2018  1:53 PM   Wound Cleansed Rinsed/Irrigated with saline 10/1/2018 10:02 AM   Wound Length (cm) 0.5 cm 10/1/2018 10:02 AM   Wound Width (cm) 0.5 cm 10/1/2018 10:02 AM   Wound Depth (cm)  0.1 10/1/2018 10:02 AM   Calculated Wound Size (cm^2) (l*w) 0.25 cm^2 10/1/2018 10:02 AM   Change in Wound Size % (l*w) 87.37 10/1/2018 10:02 AM   Wound Assessment Pink 10/1/2018 10:02 AM   Drainage Amount Small 10/1/2018 10:02 AM   Drainage Description Serosanguinous 10/1/2018 10:02 AM   Odor None 10/1/2018 10:02 AM   Margins Attached edges; Defined edges 9/4/2018  1:13 PM   Galilea-wound Assessment Maceration 10/1/2018 10:02 AM   Non-staged Wound Description Full thickness 9/10/2018  1:24 PM   [place]%Wound Bed 100 8/20/2018  1:22 PM   Red%Wound Bed 100 8/20/2018  1:35 PM   Yellow%Wound Bed 5 7/16/2018  1:45 PM   Number of days: 68             Asessment: Patient is a 28 y.o. male with:    Diagnosis Orders   1. Skin ulcer of right heel, limited to breakdown of skin (HCC)  TX DEBRIDEMENT OPEN WOUND 20 SQ CM<     Ulcer Classification:  R heel ulcer full thickness grade 1 C  IDSA  no infection. Plan: Active wound management took place 100% non excisional with the use of  curette. All non viable tissue (including epidermis and/or dermis) and bio burden was removed to promote healing. Please see chart for exact measurements, if not documented then size was less than 20 sq cm. Dressing: fibracol qd  Patient will continue offloading as advised. Patient will follow up in 1 week. Contact clinic with any questions/problems/concerns or new signs of infection.

## 2018-10-01 NOTE — PATIENT INSTRUCTIONS
Stop mepilex dressing to right heel wound  Start fibracol or equivalent dressing to right heel wound, CHANGE DRESSING DAILY  Continue with off loading heel boot  Follow up with Dr Lillian Zamora in 1 week

## 2018-10-08 ENCOUNTER — OFFICE VISIT (OUTPATIENT)
Dept: WOUND CARE | Age: 33
End: 2018-10-08
Payer: MEDICARE

## 2018-10-08 VITALS — TEMPERATURE: 97.6 F | HEART RATE: 78 BPM | SYSTOLIC BLOOD PRESSURE: 140 MMHG | DIASTOLIC BLOOD PRESSURE: 80 MMHG

## 2018-10-08 DIAGNOSIS — L97.411 SKIN ULCER OF RIGHT HEEL, LIMITED TO BREAKDOWN OF SKIN (HCC): Primary | ICD-10-CM

## 2018-10-08 DIAGNOSIS — E11.49 DM (DIABETES MELLITUS), TYPE 2 WITH NEUROLOGICAL COMPLICATIONS (HCC): ICD-10-CM

## 2018-10-08 PROCEDURE — 15275 SKIN SUB GRAFT FACE/NK/HF/G: CPT | Performed by: PODIATRIST

## 2018-10-08 NOTE — PROGRESS NOTES
secured in place using wound veil and steri strips. Dressing took place with mineral oil gauze, dry gauze, kiley, and coban. Patient will continue offloading as advised. Patient will follow up in 1 week. Contact clinic with any questions/problems/concerns or new signs of infection. Vicky montano

## 2018-10-08 NOTE — PATIENT INSTRUCTIONS
apligraf applied today to right heel. Do not disturb dressing, may change only outer coban wrap if soiled. Do Not Get wet.     F/u with Dr Olive Mello on 10/16/18

## 2018-10-11 ENCOUNTER — TELEPHONE (OUTPATIENT)
Dept: WOUND CARE | Age: 33
End: 2018-10-11

## 2018-10-11 NOTE — TELEPHONE ENCOUNTER
Faxed AVS with Wound Care instructions and Progress Note to [id number] on 10/8/18.

## 2018-10-16 ENCOUNTER — OFFICE VISIT (OUTPATIENT)
Dept: WOUND CARE | Age: 33
End: 2018-10-16
Payer: COMMERCIAL

## 2018-10-16 ENCOUNTER — TELEPHONE (OUTPATIENT)
Dept: WOUND CARE | Age: 33
End: 2018-10-16

## 2018-10-16 VITALS — HEART RATE: 64 BPM | DIASTOLIC BLOOD PRESSURE: 76 MMHG | SYSTOLIC BLOOD PRESSURE: 118 MMHG | TEMPERATURE: 98 F

## 2018-10-16 DIAGNOSIS — L97.411 SKIN ULCER OF RIGHT HEEL, LIMITED TO BREAKDOWN OF SKIN (HCC): Primary | ICD-10-CM

## 2018-10-16 DIAGNOSIS — E11.49 DM (DIABETES MELLITUS), TYPE 2 WITH NEUROLOGICAL COMPLICATIONS (HCC): ICD-10-CM

## 2018-10-16 PROCEDURE — 99999 PR OFFICE/OUTPT VISIT,PROCEDURE ONLY: CPT | Performed by: PODIATRIST

## 2018-10-16 PROCEDURE — 97597 DBRDMT OPN WND 1ST 20 CM/<: CPT | Performed by: PODIATRIST

## 2018-10-16 NOTE — PROGRESS NOTES
Subjective:    Cachorro Brar is a 28 y.o. male who presents with the ulceration of the R heel. Pt presents in wheelchair today. Dressing stayed dry and intact. Patient relates pain is Absent . Pain is rated 0 out of 10 and is described as none. Currently denies F/C/N/V. No Known Allergies    Past Medical History:   Diagnosis Date    Diabetes mellitus ([facility] [address]     Hyperlipidemia     Prader-Willi syndrome     Sleep apnea        Prior to Admission medications    Medication Sig Start Date End Date Taking?  Authorizing Provider   loperamide (IMODIUM) 2 MG capsule Take 2 mg by mouth 3 times daily as needed for Diarrhea   Yes Historical Provider, MD   acetaminophen (TYLENOL) 500 MG tablet Take 500 mg by mouth every 6 hours as needed for Pain or Fever (q 4-6 hours prn)   Yes Historical Provider, MD   insulin detemir (LEVEMIR FLEXTOUCH) 100 UNIT/ML injection pen Inject 45 Units into the skin 2 times daily 4/10/18  Yes Historical Provider, MD   insulin aspart (NOVOLOG) 100 UNIT/ML injection vial Inject into the skin 3 times daily (before meals)   Yes Historical Provider, MD   carBAMazepine (TEGRETOL XR) 200 MG extended release tablet Take 200 mg by mouth 2 times daily 3/1/18  Yes Historical Provider, MD   insulin glargine (LANTUS) 100 UNIT/ML injection vial Inject 25 Units into the skin 2 times daily 1/21/18  Yes Historical Provider, MD   insulin regular (HUMULIN R;NOVOLIN R) 100 UNIT/ML injection Inject 12 Units into the skin 3 times daily (before meals) 1/21/18  Yes Historical Provider, MD   levothyroxine (SYNTHROID) 50 MCG tablet Take 50 mcg by mouth Daily 1/21/18  Yes Historical Provider, MD   oxybutynin (DITROPAN-XL) 5 MG extended release tablet Take 5 mg by mouth daily 1/21/18  Yes Historical Provider, MD   pioglitazone (ACTOS) 15 MG tablet Take 15 mg by mouth daily 1/21/18  Yes Historical Provider, MD   rosuvastatin (CRESTOR) 40 MG tablet Take 40 mg by mouth every evening 1/21/18  Yes Historical Provider, MD albuterol sulfate  (90 BASE) MCG/ACT inhaler Inhale 2 puffs into the lungs every 6 hours as needed for Wheezing   Yes Historical Provider, MD   CPAP Machine MISC by Does not apply route nightly   Yes Historical Provider, MD   metFORMIN (GLUCOPHAGE) 500 MG tablet Take 1,000 mg by mouth 2 times daily (with meals)  1/21/18  Yes Historical Provider, MD       Social History   Substance Use Topics    Smoking status: Never Smoker    Smokeless tobacco: Never Used    Alcohol use No       Review of Systems: All 12 systems reviewed and pertinent positives noted above. Lower Extremity Physical Examination:     Vitals:   Vitals:    10/16/18 0957   BP: 118/76   Pulse: 64   Temp: 98 °F (36.7 °C)     General: AAO x 3 in NAD. Vascular: DP and PT pulses palpable 2/4, bilateral.  CFT <3 seconds, bilateral.  Hair growth present to the level of the digits, bilateral.  Edema present, bilateral.  Varicosities present, bilateral. Erythema absent, bilateral. Distal Rubor absent bilateral.  Temperature within normal limits bilateral. Hyperpigmentation present bilateral. Atrophic skin yes. Neurological: Sensation Impaired to light touch to level of digits, bilateral.  Protective sensation intact  10/10 sites via 5.07/10g [place] Monofilament, bilateral.  negative Tinel's, bilateral.  negative Valleix sign, bilateral.  Vibratory abnormal  bilateral.  Reflexes Decreased bilateral.  Paresthesias positive. Dysthesias negative. Sharp/dull intact bilateral.  Musculoskeletal: Muscle strength 5/5, bilateral.  Pain absent upon palpation bilateral. Normal medial longitudinal arch, bilateral.  Ankle ROM within normal limits,bilateral.  1st MPJ ROM within normal limits, bilateral.  Dorsally contracted digits absent. No other foot deformities. Integument:   Open lesion present, Right. Ulcer plantar R heel,  positive fibrin, healthy red granular base, no probing no undermining no malodor. No surrounding signs of infx.

## 2018-10-29 ENCOUNTER — OFFICE VISIT (OUTPATIENT)
Dept: UROLOGY | Age: 33
End: 2018-10-29
Payer: MEDICARE

## 2018-10-29 ENCOUNTER — OFFICE VISIT (OUTPATIENT)
Dept: WOUND CARE | Age: 33
End: 2018-10-29
Payer: MEDICARE

## 2018-10-29 VITALS
TEMPERATURE: 98.8 F | HEIGHT: 73 IN | HEART RATE: 88 BPM | BODY MASS INDEX: 41.75 KG/M2 | DIASTOLIC BLOOD PRESSURE: 66 MMHG | RESPIRATION RATE: 22 BRPM | SYSTOLIC BLOOD PRESSURE: 110 MMHG | WEIGHT: 315 LBS

## 2018-10-29 VITALS — OXYGEN SATURATION: 96 % | HEART RATE: 87 BPM | SYSTOLIC BLOOD PRESSURE: 110 MMHG | DIASTOLIC BLOOD PRESSURE: 66 MMHG

## 2018-10-29 DIAGNOSIS — L97.411 SKIN ULCER OF RIGHT HEEL, LIMITED TO BREAKDOWN OF SKIN (HCC): Primary | ICD-10-CM

## 2018-10-29 DIAGNOSIS — N39.0 RECURRENT UTI: Primary | ICD-10-CM

## 2018-10-29 DIAGNOSIS — E11.49 DM (DIABETES MELLITUS), TYPE 2 WITH NEUROLOGICAL COMPLICATIONS (HCC): ICD-10-CM

## 2018-10-29 DIAGNOSIS — N26.1 ATROPHY OF LEFT KIDNEY: ICD-10-CM

## 2018-10-29 DIAGNOSIS — Z87.448 H/O URETHRAL STRICTURE: ICD-10-CM

## 2018-10-29 PROCEDURE — 97597 DBRDMT OPN WND 1ST 20 CM/<: CPT | Performed by: PODIATRIST

## 2018-10-29 PROCEDURE — G8427 DOCREV CUR MEDS BY ELIG CLIN: HCPCS | Performed by: UROLOGY

## 2018-10-29 PROCEDURE — G8417 CALC BMI ABV UP PARAM F/U: HCPCS | Performed by: UROLOGY

## 2018-10-29 PROCEDURE — 99999 PR OFFICE/OUTPT VISIT,PROCEDURE ONLY: CPT | Performed by: PODIATRIST

## 2018-10-29 PROCEDURE — 1036F TOBACCO NON-USER: CPT | Performed by: UROLOGY

## 2018-10-29 PROCEDURE — 99205 OFFICE O/P NEW HI 60 MIN: CPT | Performed by: UROLOGY

## 2018-10-29 PROCEDURE — G8484 FLU IMMUNIZE NO ADMIN: HCPCS | Performed by: UROLOGY

## 2018-10-29 ASSESSMENT — PATIENT HEALTH QUESTIONNAIRE - PHQ9: DEPRESSION UNABLE TO ASSESS: PT REFUSES

## 2018-10-29 NOTE — PROGRESS NOTES
Detail Level: Detailed  oxybutynin (DITROPAN-XL) 5 MG extended release tablet Take 5 mg by mouth daily      pioglitazone (ACTOS) 15 MG tablet Take 15 mg by mouth daily      rosuvastatin (CRESTOR) 40 MG tablet Take 40 mg by mouth every evening      albuterol sulfate  (90 BASE) MCG/ACT inhaler Inhale 2 puffs into the lungs every 6 hours as needed for Wheezing      CPAP Machine MISC by Does not apply route nightly      metFORMIN (GLUCOPHAGE) 500 MG tablet Take 1,000 mg by mouth 2 times daily (with meals)          Patient has no known allergies. History   Smoking Status    Never Smoker   Smokeless Tobacco    Never Used      (If patient a smoker, smoking cessation counseling offered)   History   Alcohol Use No       REVIEW OF SYSTEMS:  Constitutional: negative  Eyes: negative  Respiratory: negative  Cardiovascular: negative  Gastrointestinal: negative  Genitourinary: see HPI  Musculoskeletal: negative  Skin: negative   Neurological: negative  Hematological/Lymphatic: negative  Psychological: negative        Physical Exam:    This a 28 y.o. male  Vitals:    10/29/18 0930   BP: 110/66   Pulse: 87   SpO2: 96%     There is no height or weight on file to calculate BMI. Constitutional: Patient in no acute distress; Neuro: alert and oriented to person place and time. Psych: Mood and affect normal.  Skin: warm, dry  Lungs: Respiratory effort normal, Symmetric chest rise  Cardiovascular:  Normal peripheral pulses; Regular rate, radial pulses palpable  Abdomen: Soft, non-tender, non-distended with no CVA, flank pain, hepatosplenomegaly or hernia. Kidneys normal.  Bladder non-tender and not distended. Lymphatics: no palpable lymphadenopathy  Minimal/no edema in bilateral lower extremities        Assessment and Plan        1. Recurrent UTI    2. Urethral stricture due to childbirth    3.  Atrophy of left kidney               Plan:        Renal ultrasound  KUB to check if stent is present  Cysto urethral dilation MAC in Silver Nitrate Text: The wound bed was treated with silver nitrate after the biopsy was performed. Additional Anesthesia Volume In Cc (Will Not Render If 0): 0 Anesthesia Type: 1% lidocaine with epinephrine Biopsy Type: H and E Notification Instructions: Patient will be notified of biopsy results. However, patient instructed to call the office if not contacted within 2 weeks. Electrodesiccation And Curettage Text: The wound bed was treated with electrodesiccation Hemostasis: Drysol Wound Care: Bacitracin Electrodesiccation Text: The wound bed was treated with electrodesiccation after the biopsy was performed. Consent: Written consent was obtained and risks were reviewed including but not limited to scarring, infection, bleeding, scabbing, incomplete removal, nerve damage and allergy to anesthesia. Bill For Surgical Tray: no Biopsy Method: Dermablade X Size Of Lesion In Cm: 0.6 Dressing: bandage Post-Care Instructions: I reviewed with the patient in detail post-care instructions. Patient is to keep the biopsy site dry overnight, and then apply bacitracin twice daily until healed. Patient may apply hydrogen peroxide soaks to remove any crusting. Type Of Destruction Used: Electrodesiccation Anesthesia Volume In Cc (Will Not Render If 0): 1 Billing Type: Third-Party Bill Size Of Lesion In Cm: 0.4 Cryotherapy Text: The wound bed was treated with cryotherapy after the biopsy was performed.

## 2018-10-31 ENCOUNTER — TELEPHONE (OUTPATIENT)
Dept: WOUND CARE | Age: 33
End: 2018-10-31

## 2018-11-06 LAB
AVERAGE GLUCOSE: NORMAL
HBA1C MFR BLD: 10.9 %

## 2018-11-12 ENCOUNTER — OFFICE VISIT (OUTPATIENT)
Dept: WOUND CARE | Age: 33
End: 2018-11-12
Payer: MEDICARE

## 2018-11-12 VITALS
TEMPERATURE: 98.6 F | RESPIRATION RATE: 18 BRPM | BODY MASS INDEX: 35.75 KG/M2 | WEIGHT: 271 LBS | SYSTOLIC BLOOD PRESSURE: 130 MMHG | HEART RATE: 80 BPM | DIASTOLIC BLOOD PRESSURE: 68 MMHG

## 2018-11-12 DIAGNOSIS — E11.49 DM (DIABETES MELLITUS), TYPE 2 WITH NEUROLOGICAL COMPLICATIONS (HCC): ICD-10-CM

## 2018-11-12 DIAGNOSIS — L97.411 ULCER OF RIGHT HEEL, LIMITED TO BREAKDOWN OF SKIN (HCC): ICD-10-CM

## 2018-11-12 DIAGNOSIS — L97.911 ULCER OF RIGHT LOWER EXTREMITY, LIMITED TO BREAKDOWN OF SKIN (HCC): Primary | ICD-10-CM

## 2018-11-12 DIAGNOSIS — I87.2 CHRONIC VENOUS INSUFFICIENCY: ICD-10-CM

## 2018-11-12 PROCEDURE — L3260 AMBULATORY SURGICAL BOOT EAC: HCPCS | Performed by: PODIATRIST

## 2018-11-12 PROCEDURE — 2022F DILAT RTA XM EVC RTNOPTHY: CPT | Performed by: PODIATRIST

## 2018-11-12 PROCEDURE — 97597 DBRDMT OPN WND 1ST 20 CM/<: CPT | Performed by: PODIATRIST

## 2018-11-12 PROCEDURE — G8428 CUR MEDS NOT DOCUMENT: HCPCS | Performed by: PODIATRIST

## 2018-11-12 PROCEDURE — G8417 CALC BMI ABV UP PARAM F/U: HCPCS | Performed by: PODIATRIST

## 2018-11-12 PROCEDURE — G8484 FLU IMMUNIZE NO ADMIN: HCPCS | Performed by: PODIATRIST

## 2018-11-12 PROCEDURE — 3046F HEMOGLOBIN A1C LEVEL >9.0%: CPT | Performed by: PODIATRIST

## 2018-11-12 PROCEDURE — 99213 OFFICE O/P EST LOW 20 MIN: CPT | Performed by: PODIATRIST

## 2018-11-12 PROCEDURE — 1036F TOBACCO NON-USER: CPT | Performed by: PODIATRIST

## 2018-11-12 NOTE — PROGRESS NOTES
Subjective:    Nghia Carney is a 28 y.o. male who presents with the new area R leg. Pt states it started as a blister. Pt has not used comrpession recently. No new tx started. No pain. Pt still has ulceration of the R heel. Pt adds the dressing wasn't put on and he thinks that made the wound get worse. Pt also has wore heel offloading boot as a shoe around the house. Patient relates pain is Absent . Pain is rated 0 out of 10 and is described as none. Currently denies F/C/N/V. No Known Allergies    Past Medical History:   Diagnosis Date    Diabetes mellitus ([facility] [address]     Hyperlipidemia     Prader-Willi syndrome     Sleep apnea        Prior to Admission medications    Medication Sig Start Date End Date Taking?  Authorizing Provider   loperamide (IMODIUM) 2 MG capsule Take 2 mg by mouth 3 times daily as needed for Diarrhea   Yes Historical Provider, MD   acetaminophen (TYLENOL) 500 MG tablet Take 500 mg by mouth every 6 hours as needed for Pain or Fever (q 4-6 hours prn)   Yes Historical Provider, MD   insulin detemir (LEVEMIR FLEXTOUCH) 100 UNIT/ML injection pen Inject 45 Units into the skin 2 times daily 4/10/18  Yes Historical Provider, MD   insulin aspart (NOVOLOG) 100 UNIT/ML injection vial Inject into the skin 3 times daily (before meals)   Yes Historical Provider, MD   carBAMazepine (TEGRETOL XR) 200 MG extended release tablet Take 200 mg by mouth 2 times daily 3/1/18  Yes Historical Provider, MD   insulin glargine (LANTUS) 100 UNIT/ML injection vial Inject 25 Units into the skin 2 times daily 1/21/18  Yes Historical Provider, MD   insulin regular (HUMULIN R;NOVOLIN R) 100 UNIT/ML injection Inject 12 Units into the skin 3 times daily (before meals) 1/21/18  Yes Historical Provider, MD   levothyroxine (SYNTHROID) 50 MCG tablet Take 50 mcg by mouth Daily 1/21/18  Yes Historical Provider, MD   oxybutynin (DITROPAN-XL) 5 MG extended release tablet Take 5 mg by mouth daily 1/21/18  Yes Historical Provider,

## 2018-11-12 NOTE — PATIENT INSTRUCTIONS
Continue foam dressing to right heel ulcer (Mepilex bordered foam dressing applied to wounds today). Start foam dressing to Right lower leg ulcer, and any ulcers as they open up. Use compression stockings or ACE wrap daily to manage leg swelling. Elevate legs to heart level or above while at rest. Do ankle pumps and rolls while at rest.     Off-loading shoe dispensed today. Wear this at all times on your right foot while awake. Follow up with Dr. Surya paris in 1 week. SIGNS OF INFECTION  - Redness, swelling, skin hot  - Wound bed turns black or stringy yellow  - Foul odor  - Increased drainage or pus  - Increased pain  - Fever greater than 100F    CALL YOUR DOCTOR OR SEEK MEDICAL ATTENTION IF SIGNS OF INFECTION. DO NOT WAIT UNTIL YOUR NEXT APPOINTMENT    Call the Wound Care Nurse with any other questions or concerns- [phone].

## 2018-11-13 ENCOUNTER — TELEPHONE (OUTPATIENT)
Dept: WOUND CARE | Age: 33
End: 2018-11-13

## 2018-11-13 NOTE — TELEPHONE ENCOUNTER
Faxed AVS with Wound Care instructions and Progress Note from 11/12/18 to [address] on 11/13/18.

## 2018-11-19 ENCOUNTER — OFFICE VISIT (OUTPATIENT)
Dept: WOUND CARE | Age: 33
End: 2018-11-19
Payer: MEDICARE

## 2018-11-19 ENCOUNTER — HOSPITAL ENCOUNTER (OUTPATIENT)
Age: 33
Setting detail: SPECIMEN
Discharge: HOME | End: 2018-11-19
Payer: MEDICARE

## 2018-11-19 VITALS — TEMPERATURE: 97.5 F | SYSTOLIC BLOOD PRESSURE: 120 MMHG | HEART RATE: 84 BPM | DIASTOLIC BLOOD PRESSURE: 76 MMHG

## 2018-11-19 DIAGNOSIS — E11.51 CONTROLLED TYPE 2 DM WITH PERIPHERAL CIRCULATORY DISORDER (HCC): ICD-10-CM

## 2018-11-19 DIAGNOSIS — L97.411 ULCER OF RIGHT HEEL, LIMITED TO BREAKDOWN OF SKIN (HCC): Primary | ICD-10-CM

## 2018-11-19 PROCEDURE — 87077 CULTURE AEROBIC IDENTIFY: CPT

## 2018-11-19 PROCEDURE — 97597 DBRDMT OPN WND 1ST 20 CM/<: CPT | Performed by: PODIATRIST

## 2018-11-19 PROCEDURE — 87075 CULTR BACTERIA EXCEPT BLOOD: CPT

## 2018-11-19 PROCEDURE — 87205 SMEAR GRAM STAIN: CPT

## 2018-11-19 PROCEDURE — 86403 PARTICLE AGGLUT ANTBDY SCRN: CPT

## 2018-11-19 PROCEDURE — 87070 CULTURE OTHR SPECIMN AEROBIC: CPT

## 2018-11-19 PROCEDURE — 87176 TISSUE HOMOGENIZATION CULTR: CPT

## 2018-11-19 PROCEDURE — 99999 PR OFFICE/OUTPT VISIT,PROCEDURE ONLY: CPT | Performed by: PODIATRIST

## 2018-11-19 PROCEDURE — 87186 SC STD MICRODIL/AGAR DIL: CPT

## 2018-11-20 ENCOUNTER — TELEPHONE (OUTPATIENT)
Dept: WOUND CARE | Age: 33
End: 2018-11-20

## 2018-11-20 DIAGNOSIS — L97.411 ULCER OF RIGHT HEEL, LIMITED TO BREAKDOWN OF SKIN (HCC): ICD-10-CM

## 2018-11-25 LAB
CULTURE: ABNORMAL
DIRECT EXAM: ABNORMAL
DIRECT EXAM: ABNORMAL
Lab: ABNORMAL
ORGANISM: ABNORMAL
ORGANISM: ABNORMAL
SPECIMEN DESCRIPTION: ABNORMAL
STATUS: ABNORMAL

## 2018-11-26 ENCOUNTER — OFFICE VISIT (OUTPATIENT)
Dept: WOUND CARE | Age: 33
End: 2018-11-26
Payer: MEDICARE

## 2018-11-26 VITALS
SYSTOLIC BLOOD PRESSURE: 124 MMHG | TEMPERATURE: 98.1 F | DIASTOLIC BLOOD PRESSURE: 70 MMHG | RESPIRATION RATE: 20 BRPM | HEART RATE: 88 BPM

## 2018-11-26 DIAGNOSIS — L97.411 ULCER OF RIGHT HEEL, LIMITED TO BREAKDOWN OF SKIN (HCC): Primary | ICD-10-CM

## 2018-11-26 DIAGNOSIS — E11.51 CONTROLLED TYPE 2 DM WITH PERIPHERAL CIRCULATORY DISORDER (HCC): ICD-10-CM

## 2018-11-26 PROCEDURE — 97597 DBRDMT OPN WND 1ST 20 CM/<: CPT | Performed by: PODIATRIST

## 2018-11-26 PROCEDURE — 3046F HEMOGLOBIN A1C LEVEL >9.0%: CPT | Performed by: PODIATRIST

## 2018-11-26 PROCEDURE — G8484 FLU IMMUNIZE NO ADMIN: HCPCS | Performed by: PODIATRIST

## 2018-11-26 PROCEDURE — G8417 CALC BMI ABV UP PARAM F/U: HCPCS | Performed by: PODIATRIST

## 2018-11-26 PROCEDURE — G8427 DOCREV CUR MEDS BY ELIG CLIN: HCPCS | Performed by: PODIATRIST

## 2018-11-26 PROCEDURE — 2022F DILAT RTA XM EVC RTNOPTHY: CPT | Performed by: PODIATRIST

## 2018-11-26 PROCEDURE — 1036F TOBACCO NON-USER: CPT | Performed by: PODIATRIST

## 2018-11-26 PROCEDURE — 99213 OFFICE O/P EST LOW 20 MIN: CPT | Performed by: PODIATRIST

## 2018-11-26 RX ORDER — CEPHALEXIN 500 MG/1
500 CAPSULE ORAL 3 TIMES DAILY
Qty: 30 CAPSULE | Refills: 0 | Status: SHIPPED | OUTPATIENT
Start: 2018-11-26 | End: 2018-12-06

## 2018-11-26 NOTE — PROGRESS NOTES
Subjective:    Jr West is a 28 y.o. male who presents with the worsening look of ulceration of the R heel. Pt has had dressing changed every other day. .  Pt interested in what his lab showed. Pt also has wore offloading shoe. that feels good for pt. Patient relates pain is Absent . Pain is rated 0 out of 10 and is described as none. Currently denies F/C/N/V. No Known Allergies    Past Medical History:   Diagnosis Date    Diabetes mellitus ([facility] [address]     Hyperlipidemia     Prader-Willi syndrome     Sleep apnea        Prior to Admission medications    Medication Sig Start Date End Date Taking?  Authorizing Provider   cephALEXin (KEFLEX) 500 MG capsule Take 1 capsule by mouth 3 times daily for 10 days 11/26/18 12/6/18 Yes Vijay Blue DPM   loperamide (IMODIUM) 2 MG capsule Take 2 mg by mouth 3 times daily as needed for Diarrhea   Yes Historical Provider, MD   acetaminophen (TYLENOL) 500 MG tablet Take 500 mg by mouth every 6 hours as needed for Pain or Fever (q 4-6 hours prn)   Yes Historical Provider, MD   insulin detemir (LEVEMIR FLEXTOUCH) 100 UNIT/ML injection pen Inject 45 Units into the skin 2 times daily 4/10/18  Yes Historical Provider, MD   insulin aspart (NOVOLOG) 100 UNIT/ML injection vial Inject into the skin 3 times daily (before meals)   Yes Historical Provider, MD   carBAMazepine (TEGRETOL XR) 200 MG extended release tablet Take 200 mg by mouth 2 times daily 3/1/18  Yes Historical Provider, MD   insulin glargine (LANTUS) 100 UNIT/ML injection vial Inject 25 Units into the skin 2 times daily 1/21/18  Yes Historical Provider, MD   insulin regular (HUMULIN R;NOVOLIN R) 100 UNIT/ML injection Inject 12 Units into the skin 3 times daily (before meals) 1/21/18  Yes Historical Provider, MD   levothyroxine (SYNTHROID) 50 MCG tablet Take 50 mcg by mouth Daily 1/21/18  Yes Historical Provider, MD   oxybutynin (DITROPAN-XL) 5 MG extended release tablet Take 5 mg by mouth daily 1/21/18  Yes Historical contracted digits absent. No other foot deformities. Integument:   Open lesion present, Right. Ulcer plantar R heel, positive fibrin,  red granular base, maceration on posterior aspect no probing no undermining no malodor. No surrounding signs of infx. Interdigital maceration absent to web spaces , Bilateral.  Nails thickened, dystrophic and crumbly, discolored with subungual debris. Fissures absent, Bilateral. Hyperkeratotic tissue is absent  Wound 07/16/18 #1 right heel wound (Active)   Wound Type Wound 11/19/2018  2:14 PM   Wound Diabetic 11/19/2018  2:14 PM   Dressing/Treatment Silver dressing; Foam 11/26/2018  2:07 PM   Wound Cleansed Rinsed/Irrigated with saline 11/19/2018  2:14 PM   Wound Length (cm) 4 cm 11/26/2018  2:07 PM   Wound Width (cm) 2 cm 11/26/2018  2:07 PM   Wound Depth (cm)  0.3 11/26/2018  2:07 PM   Calculated Wound Size (cm^2) (l*w) 8 cm^2 11/26/2018  2:07 PM   Change in Wound Size % (l*w) -304.04 11/26/2018  2:07 PM   Wound Assessment Red 11/19/2018  2:14 PM   Drainage Amount Large 11/26/2018  2:07 PM   Drainage Description Serosanguinous 11/26/2018  2:07 PM   Odor Mild 11/26/2018  2:07 PM   Margins Attached edges; Defined edges 9/4/2018  1:13 PM   Galilea-wound Assessment Calloused; Maceration 11/19/2018  2:14 PM   Non-staged Wound Description Full thickness 11/19/2018  2:14 PM   [place]%Wound Bed 100 8/20/2018  1:22 PM   Red%Wound Bed 100 8/20/2018  1:35 PM   Yellow%Wound Bed 5 7/16/2018  1:45 PM   Culture Taken Yes 11/19/2018  2:31 PM   Number of days: 133         Lab: see chart    Asessment: Patient is a 28 y.o. male with:    Diagnosis Orders   1. Ulcer of right heel, limited to breakdown of skin (HCC)  HI DEBRIDEMENT OPEN WOUND 20 SQ CM<   2. Controlled type 2 DM with peripheral circulatory disorder (HCC)  HI DEBRIDEMENT OPEN WOUND 20 SQ CM<     Ulcer Classification:  R heel and leg ulcer full thickness grade 1 C  IDSA  no infection.     Plan:  DM foot ed and exam  Active wound management took place (R heel ulcer only) 100% non excisional with the use of  tissue nippers. All non viable tissue (including epidermis and/or dermis) and bio burden was removed to promote healing. Please see chart for exact measurements, if not documented then size was less than 20 sq cm. Dressing: foam silver  Labs reviewed with pt in detail. All questions answered. Orders Placed This Encounter   Medications    cephALEXin (KEFLEX) 500 MG capsule     Sig: Take 1 capsule by mouth 3 times daily for 10 days     Dispense:  30 capsule     Refill:  0     Pt to use compression stockings. Pt stressed importance of this and how it can prevent re occurrence of the ulcerations. Appropriate use of the compression stockings were discussed. Continue offloading, importance of shoe gear during WB were discussed.    Follow up in 1 week

## 2018-11-27 ENCOUNTER — TELEPHONE (OUTPATIENT)
Dept: WOUND CARE | Age: 33
End: 2018-11-27

## 2018-11-30 ENCOUNTER — TELEPHONE (OUTPATIENT)
Dept: UROLOGY | Age: 33
End: 2018-11-30

## 2018-12-03 ENCOUNTER — OFFICE VISIT (OUTPATIENT)
Dept: WOUND CARE | Age: 33
End: 2018-12-03
Payer: MEDICARE

## 2018-12-03 VITALS — TEMPERATURE: 97.6 F | HEART RATE: 78 BPM | DIASTOLIC BLOOD PRESSURE: 80 MMHG | SYSTOLIC BLOOD PRESSURE: 138 MMHG

## 2018-12-03 DIAGNOSIS — E11.51 CONTROLLED TYPE 2 DM WITH PERIPHERAL CIRCULATORY DISORDER (HCC): ICD-10-CM

## 2018-12-03 DIAGNOSIS — L97.411 ULCER OF RIGHT HEEL, LIMITED TO BREAKDOWN OF SKIN (HCC): Primary | ICD-10-CM

## 2018-12-03 PROCEDURE — 97597 DBRDMT OPN WND 1ST 20 CM/<: CPT | Performed by: PODIATRIST

## 2018-12-03 PROCEDURE — 99999 PR OFFICE/OUTPT VISIT,PROCEDURE ONLY: CPT | Performed by: PODIATRIST

## 2018-12-03 NOTE — PROGRESS NOTES
Subjective:    Leeanne Ellison is a 28 y.o. male who presents with the worsening look of ulceration of the R heel. Pt has had dressing changed every other day. Pt has wore offloading shoe. that feels good for pt. Patient relates pain is Absent . Pain is rated 0 out of 10 and is described as none. Currently denies F/C/N/V. No Known Allergies    Past Medical History:   Diagnosis Date    Diabetes mellitus ([facility] [address]     Hyperlipidemia     Prader-Willi syndrome     Sleep apnea        Prior to Admission medications    Medication Sig Start Date End Date Taking?  Authorizing Provider   cephALEXin (KEFLEX) 500 MG capsule Take 1 capsule by mouth 3 times daily for 10 days 11/26/18 12/6/18 Yes Varrachel Banana, DPM   loperamide (IMODIUM) 2 MG capsule Take 2 mg by mouth 3 times daily as needed for Diarrhea   Yes Historical Provider, MD   acetaminophen (TYLENOL) 500 MG tablet Take 500 mg by mouth every 6 hours as needed for Pain or Fever (q 4-6 hours prn)   Yes Historical Provider, MD   insulin detemir (LEVEMIR FLEXTOUCH) 100 UNIT/ML injection pen Inject 45 Units into the skin 2 times daily 4/10/18  Yes Historical Provider, MD   insulin aspart (NOVOLOG) 100 UNIT/ML injection vial Inject into the skin 3 times daily (before meals)   Yes Historical Provider, MD   carBAMazepine (TEGRETOL XR) 200 MG extended release tablet Take 200 mg by mouth 2 times daily 3/1/18  Yes Historical Provider, MD   insulin glargine (LANTUS) 100 UNIT/ML injection vial Inject 25 Units into the skin 2 times daily 1/21/18  Yes Historical Provider, MD   insulin regular (HUMULIN R;NOVOLIN R) 100 UNIT/ML injection Inject 12 Units into the skin 3 times daily (before meals) 1/21/18  Yes Historical Provider, MD   levothyroxine (SYNTHROID) 50 MCG tablet Take 50 mcg by mouth Daily 1/21/18  Yes Historical Provider, MD   oxybutynin (DITROPAN-XL) 5 MG extended release tablet Take 5 mg by mouth daily 1/21/18  Yes Historical Provider, MD   pioglitazone (ACTOS) 15 MG

## 2018-12-05 ENCOUNTER — TELEPHONE (OUTPATIENT)
Dept: WOUND CARE | Age: 33
End: 2018-12-05

## 2018-12-07 ENCOUNTER — HOSPITAL ENCOUNTER (EMERGENCY)
Age: 33
Discharge: HOME | End: 2018-12-07
Attending: EMERGENCY MEDICINE
Payer: MEDICARE

## 2018-12-07 ENCOUNTER — APPOINTMENT (OUTPATIENT)
Dept: GENERAL RADIOLOGY | Age: 33
End: 2018-12-07
Payer: MEDICARE

## 2018-12-07 VITALS
RESPIRATION RATE: 16 BRPM | TEMPERATURE: 98.3 F | OXYGEN SATURATION: 99 % | DIASTOLIC BLOOD PRESSURE: 85 MMHG | SYSTOLIC BLOOD PRESSURE: 132 MMHG | HEART RATE: 72 BPM

## 2018-12-07 DIAGNOSIS — S83.91XA SPRAIN OF RIGHT KNEE, UNSPECIFIED LIGAMENT, INITIAL ENCOUNTER: Primary | ICD-10-CM

## 2018-12-07 PROCEDURE — 6370000000 HC RX 637 (ALT 250 FOR IP): Performed by: EMERGENCY MEDICINE

## 2018-12-07 PROCEDURE — 73562 X-RAY EXAM OF KNEE 3: CPT

## 2018-12-07 PROCEDURE — 99283 EMERGENCY DEPT VISIT LOW MDM: CPT

## 2018-12-07 RX ORDER — HYDROCODONE BITARTRATE AND ACETAMINOPHEN 5; 325 MG/1; MG/1
1 TABLET ORAL EVERY 6 HOURS PRN
Qty: 10 TABLET | Refills: 0 | Status: SHIPPED | OUTPATIENT
Start: 2018-12-07 | End: 2018-12-10

## 2018-12-07 RX ORDER — HYDROCODONE BITARTRATE AND ACETAMINOPHEN 5; 325 MG/1; MG/1
2 TABLET ORAL ONCE
Status: COMPLETED | OUTPATIENT
Start: 2018-12-07 | End: 2018-12-07

## 2018-12-07 RX ORDER — IBUPROFEN 800 MG/1
800 TABLET ORAL EVERY 8 HOURS PRN
Qty: 30 TABLET | Refills: 0 | Status: ON HOLD | OUTPATIENT
Start: 2018-12-07 | End: 2019-03-07 | Stop reason: HOSPADM

## 2018-12-07 RX ADMIN — HYDROCODONE BITARTRATE AND ACETAMINOPHEN 2 TABLET: 5; 325 TABLET ORAL at 12:25

## 2018-12-07 ASSESSMENT — PAIN SCALES - GENERAL
PAINLEVEL_OUTOF10: 9
PAINLEVEL_OUTOF10: 9

## 2018-12-07 ASSESSMENT — PAIN DESCRIPTION - DESCRIPTORS: DESCRIPTORS: SHARP

## 2018-12-07 ASSESSMENT — PAIN DESCRIPTION - PAIN TYPE: TYPE: ACUTE PAIN

## 2018-12-07 ASSESSMENT — PAIN DESCRIPTION - ORIENTATION: ORIENTATION: RIGHT

## 2018-12-07 ASSESSMENT — PAIN DESCRIPTION - LOCATION: LOCATION: KNEE

## 2018-12-07 NOTE — ED PROVIDER NOTES
[facility] ED  [place] [address]  Phone: [phone]    Pt Name: Pippa Thakur  MRN: [id number]  Daryngfemily 1985  Date of evaluation: 12/7/2018      CHIEF COMPLAINT       Chief Complaint   Patient presents with    Fall     pt was attempting to get up from chair to go to his room and his right knee \"locked\" up and he fell- unable to get himself up and continues to have pain in the right knee         HISTORY OF PRESENT ILLNESS     Pippa Thakur is a 28 y.o. male who presents With 9 out of a 10 pain in the right knee after he was walking in the kitchen and suddenly locked up and he had a hyperextension type injury but strike it on the ground. He has a chronic wound of the right foot wound care. On antibiotics at this time. He has multiple medical problems including diabetes and hyperlipidemia and sleep apnea. He has also Prader-Willi syndrome. Lives with his mother. Denies any chest complaints and there is no history of DVT or PE. Patient does not smoke. In by his relative. No syncopal episode        REVIEW OF SYSTEMS         REVIEW OF SYSTEMS    Constitutional:  Denies fever, chills, or weakness   Eyes:  Denies vision changes  HEENT:  Denies sore throat or nasal congestion  Respiratory:  Denies cough or shortness of breath   Cardiovascular:  Denies chest pain  GI:  Denies abdominal pain, nauseoa, vmiting, or diarrhea   Musculoskeletal: As above  Skin:  No rash on exposed surfaces   Neurologic:  Normal baseline mentation. No new deficits. Lymphatic:   No nodes or infection  Psychiatric:  No psychosis. Alert and interacting normally. Other ROS negative except as noted above. PAST MEDICAL HISTORY    has a past medical history of Diabetes mellitus ([address] Hyperlipidemia; Prader-Willi syndrome; and Sleep apnea. SURGICAL HISTORY      has a past surgical history that includes knee surgery; Muscle biopsy;  Exploration of undescended testicle (Bilateral, 1987); and Cystocopy -------------------------  BP: 132/85, Temp: 98.3 °F (36.8 °C), Pulse: 72, Resp: 16    See DDX/MDM (Differential Diagnosis/Medical Decision Making) above      FINAL IMPRESSION      1. Sprain of right knee, unspecified ligament, initial encounter          DISPOSITION/PLAN   DISPOSITION Decision To Discharge 12/07/2018 12:58:19 PM      Condition on Disposition    Fair    PATIENT REFERRED TO:  Felipe Kasper MD  [phone] [id number] [phone]    In 3 days        DISCHARGE MEDICATIONS:  New Prescriptions    HYDROCODONE-ACETAMINOPHEN (NORCO) 5-325 MG PER TABLET    Take 1 tablet by mouth every 6 hours as needed for Pain for up to 3 days. .    IBUPROFEN (ADVIL;MOTRIN) 800 MG TABLET    Take 1 tablet by mouth every 8 hours as needed for Pain       (Please note that portions of this note were completed with a voice recognition program.  Efforts were made to edit the dictations but occasionally words are mis-transcribed.)    Madi Worrell, DO  Attending Emergency Physician       Madi Worrell,   12/07/18 [id number]

## 2018-12-10 ENCOUNTER — OFFICE VISIT (OUTPATIENT)
Dept: WOUND CARE | Age: 33
End: 2018-12-10
Payer: MEDICARE

## 2018-12-10 VITALS
TEMPERATURE: 98.3 F | HEART RATE: 68 BPM | DIASTOLIC BLOOD PRESSURE: 90 MMHG | WEIGHT: 315 LBS | BODY MASS INDEX: 45.93 KG/M2 | SYSTOLIC BLOOD PRESSURE: 148 MMHG

## 2018-12-10 DIAGNOSIS — L97.411 ULCER OF RIGHT HEEL, LIMITED TO BREAKDOWN OF SKIN (HCC): Primary | ICD-10-CM

## 2018-12-10 DIAGNOSIS — E11.51 CONTROLLED TYPE 2 DM WITH PERIPHERAL CIRCULATORY DISORDER (HCC): ICD-10-CM

## 2018-12-10 PROCEDURE — 97597 DBRDMT OPN WND 1ST 20 CM/<: CPT | Performed by: PODIATRIST

## 2018-12-10 PROCEDURE — 99999 PR OFFICE/OUTPT VISIT,PROCEDURE ONLY: CPT | Performed by: PODIATRIST

## 2018-12-10 NOTE — PATIENT INSTRUCTIONS
Continue with betadine daily to right heel wound edges, apply foam dressing. wear off loading shoe at all times weight bearing  F/u in 1 week with Dr Sharita Maldonado

## 2018-12-10 NOTE — PROGRESS NOTES
Subjective:    Nghia Carney is a 28 y.o. male who presents with the  ulceration of the R heel. Pt has had dressing changed as advised. Pt has wore offloading shoe. Patient relates pain is Absent . Pain is rated 0 out of 10 and is described as none. Currently denies F/C/N/V. No Known Allergies    Past Medical History:   Diagnosis Date    Diabetes mellitus ([facility] [address]     Hyperlipidemia     Prader-Willi syndrome     Sleep apnea        Prior to Admission medications    Medication Sig Start Date End Date Taking? Authorizing Provider   ibuprofen (ADVIL;MOTRIN) 800 MG tablet Take 1 tablet by mouth every 8 hours as needed for Pain 12/7/18  Yes Horace Mathiasught, DO   HYDROcodone-acetaminophen (NORCO) 5-325 MG per tablet Take 1 tablet by mouth every 6 hours as needed for Pain for up to 3 days. . 12/7/18 12/10/18 Yes Horace Anna, DO   loperamide (IMODIUM) 2 MG capsule Take 2 mg by mouth 3 times daily as needed for Diarrhea   Yes Historical Provider, MD   acetaminophen (TYLENOL) 500 MG tablet Take 500 mg by mouth every 6 hours as needed for Pain or Fever (q 4-6 hours prn)   Yes Historical Provider, MD   insulin detemir (LEVEMIR FLEXTOUCH) 100 UNIT/ML injection pen Inject 45 Units into the skin 2 times daily 4/10/18  Yes Historical Provider, MD   insulin aspart (NOVOLOG) 100 UNIT/ML injection vial Inject into the skin 3 times daily (before meals)   Yes Historical Provider, MD   carBAMazepine (TEGRETOL XR) 200 MG extended release tablet Take 200 mg by mouth 2 times daily 3/1/18  Yes Historical Provider, MD   insulin glargine (LANTUS) 100 UNIT/ML injection vial Inject 25 Units into the skin 2 times daily 1/21/18  Yes Historical Provider, MD   insulin regular (HUMULIN R;NOVOLIN R) 100 UNIT/ML injection Inject 12 Units into the skin 3 times daily (before meals) 1/21/18  Yes Historical Provider, MD   levothyroxine (SYNTHROID) 50 MCG tablet Take 50 mcg by mouth Daily 1/21/18  Yes Historical Provider, MD   oxybutynin

## 2018-12-14 ENCOUNTER — TELEPHONE (OUTPATIENT)
Dept: WOUND CARE | Age: 33
End: 2018-12-14

## 2018-12-31 ENCOUNTER — OFFICE VISIT (OUTPATIENT)
Dept: WOUND CARE | Age: 33
End: 2018-12-31
Payer: MEDICARE

## 2018-12-31 ENCOUNTER — TELEPHONE (OUTPATIENT)
Dept: WOUND CARE | Age: 33
End: 2018-12-31

## 2018-12-31 ENCOUNTER — TELEPHONE (OUTPATIENT)
Dept: UROLOGY | Age: 33
End: 2018-12-31

## 2018-12-31 VITALS
DIASTOLIC BLOOD PRESSURE: 68 MMHG | SYSTOLIC BLOOD PRESSURE: 118 MMHG | RESPIRATION RATE: 16 BRPM | HEART RATE: 72 BPM | TEMPERATURE: 98 F

## 2018-12-31 DIAGNOSIS — L97.411 ULCER OF RIGHT HEEL, LIMITED TO BREAKDOWN OF SKIN (HCC): Primary | ICD-10-CM

## 2018-12-31 PROCEDURE — 97597 DBRDMT OPN WND 1ST 20 CM/<: CPT | Performed by: PODIATRIST

## 2018-12-31 ASSESSMENT — PATIENT HEALTH QUESTIONNAIRE - PHQ9
SUM OF ALL RESPONSES TO PHQ9 QUESTIONS 1 & 2: 0
2. FEELING DOWN, DEPRESSED OR HOPELESS: 0
SUM OF ALL RESPONSES TO PHQ QUESTIONS 1-9: 0
SUM OF ALL RESPONSES TO PHQ QUESTIONS 1-9: 0
1. LITTLE INTEREST OR PLEASURE IN DOING THINGS: 0

## 2018-12-31 NOTE — TELEPHONE ENCOUNTER
Left message for [address] [place] [address] records; requesting most recent HgbA1C result.

## 2018-12-31 NOTE — PATIENT INSTRUCTIONS
Discontinue previous dressing. Start Iodosorb ([employer] product, will be ordered from [employer] or other mail order supply company). Cover with super absorbent dressing, secure with roll gauze and Coban. Change daily to every other day. Use Super-absorbent dressing, change daily until Iodosorb is available. SIGNS OF INFECTION  - Redness, swelling, skin hot  - Wound bed turns black or stringy yellow  - Foul odor  - Increased drainage or pus  - Increased pain  - Fever greater than 100F    CALL YOUR DOCTOR OR SEEK MEDICAL ATTENTION IF SIGNS OF INFECTION. DO NOT WAIT UNTIL YOUR NEXT APPOINTMENT    Call the Wound Care Nurse with any other questions or concerns- [phone]. Follow up in 1 week.

## 2018-12-31 NOTE — PROGRESS NOTES
Right. Ulcer plantar R heel, positive fibrin,  red granular base, mild maceration on edges, increase depth centrally, no probing no undermining no malodor. No surrounding signs of infx. Interdigital maceration absent to web spaces , Bilateral.  Nails thickened, dystrophic and crumbly, discolored with subungual debris. Fissures absent, Bilateral. Hyperkeratotic tissue is absent  Wound 07/16/18 #1 right heel wound (Active)   Wound Type Wound 11/19/2018  2:14 PM   Wound Diabetic 11/19/2018  2:14 PM   Dressing/Treatment Foam 12/10/2018  2:10 PM   Wound Cleansed Rinsed/Irrigated with saline 12/31/2018  8:43 AM   Wound Length (cm) 2.1 cm 12/31/2018  8:43 AM   Wound Width (cm) 3.4 cm 12/31/2018  8:43 AM   Wound Depth (cm)  0.7 12/31/2018  8:43 AM   Calculated Wound Size (cm^2) (l*w) 7.14 cm^2 12/31/2018  8:43 AM   Change in Wound Size % (l*w) -260.61 12/31/2018  8:43 AM   Wound Assessment Red 12/31/2018  8:43 AM   Drainage Amount Large 12/31/2018  8:43 AM   Drainage Description Serous;Purulent 12/31/2018  8:43 AM   Odor Strong 12/31/2018  8:43 AM   Margins Attached edges; Defined edges 9/4/2018  1:13 PM   Galilea-wound Assessment Maceration 12/31/2018  8:43 AM   Non-staged Wound Description Full thickness 12/31/2018  8:43 AM   [place]%Wound Bed 100 8/20/2018  1:22 PM   Red%Wound Bed 100 8/20/2018  1:35 PM   Yellow%Wound Bed 5 7/16/2018  1:45 PM   Culture Taken Yes 11/19/2018  2:31 PM   Number of days: 168           Asessment: Patient is a 28 y.o. male with:    Diagnosis Orders   1. Ulcer of right heel, limited to breakdown of skin (HCC)       Ulcer Classification:  R heel and leg ulcer full thickness grade 1 C  IDSA  no infection. Plan:  DM foot ed and exam  Active wound management took place (R heel ulcer only) 100% non excisional with the use of  tissue nippers. All non viable tissue (including epidermis and/or dermis) and bio burden was removed to promote healing.   Please see chart for exact measurements, if not

## 2019-01-02 ENCOUNTER — TELEPHONE (OUTPATIENT)
Dept: WOUND CARE | Age: 34
End: 2019-01-02

## 2019-01-07 ENCOUNTER — OFFICE VISIT (OUTPATIENT)
Dept: WOUND CARE | Age: 34
End: 2019-01-07
Payer: MEDICARE

## 2019-01-07 VITALS
SYSTOLIC BLOOD PRESSURE: 128 MMHG | WEIGHT: 315 LBS | HEART RATE: 64 BPM | HEIGHT: 73 IN | DIASTOLIC BLOOD PRESSURE: 80 MMHG | TEMPERATURE: 97.9 F | BODY MASS INDEX: 41.75 KG/M2

## 2019-01-07 DIAGNOSIS — E11.51 CONTROLLED TYPE 2 DM WITH PERIPHERAL CIRCULATORY DISORDER (HCC): ICD-10-CM

## 2019-01-07 DIAGNOSIS — L97.411 ULCER OF RIGHT HEEL, LIMITED TO BREAKDOWN OF SKIN (HCC): Primary | ICD-10-CM

## 2019-01-07 PROCEDURE — 97597 DBRDMT OPN WND 1ST 20 CM/<: CPT | Performed by: PODIATRIST

## 2019-01-07 PROCEDURE — 99999 PR OFFICE/OUTPT VISIT,PROCEDURE ONLY: CPT | Performed by: PODIATRIST

## 2019-01-08 ENCOUNTER — OFFICE VISIT (OUTPATIENT)
Dept: ORTHOPEDIC SURGERY | Age: 34
End: 2019-01-08
Payer: MEDICARE

## 2019-01-08 VITALS
HEIGHT: 73 IN | BODY MASS INDEX: 41.75 KG/M2 | SYSTOLIC BLOOD PRESSURE: 132 MMHG | WEIGHT: 315 LBS | HEART RATE: 82 BPM | DIASTOLIC BLOOD PRESSURE: 86 MMHG

## 2019-01-08 DIAGNOSIS — M25.361 KNEE INSTABILITY, RIGHT: ICD-10-CM

## 2019-01-08 DIAGNOSIS — M25.561 ACUTE PAIN OF RIGHT KNEE: Primary | ICD-10-CM

## 2019-01-08 PROCEDURE — G8484 FLU IMMUNIZE NO ADMIN: HCPCS | Performed by: PHYSICIAN ASSISTANT

## 2019-01-08 PROCEDURE — G8417 CALC BMI ABV UP PARAM F/U: HCPCS | Performed by: PHYSICIAN ASSISTANT

## 2019-01-08 PROCEDURE — 1036F TOBACCO NON-USER: CPT | Performed by: PHYSICIAN ASSISTANT

## 2019-01-08 PROCEDURE — G8427 DOCREV CUR MEDS BY ELIG CLIN: HCPCS | Performed by: PHYSICIAN ASSISTANT

## 2019-01-08 PROCEDURE — 99212 OFFICE O/P EST SF 10 MIN: CPT | Performed by: PHYSICIAN ASSISTANT

## 2019-01-10 ENCOUNTER — TELEPHONE (OUTPATIENT)
Dept: WOUND CARE | Age: 34
End: 2019-01-10

## 2019-01-14 ENCOUNTER — HOSPITAL ENCOUNTER (OUTPATIENT)
Age: 34
Setting detail: OUTPATIENT SURGERY
Discharge: HOME | End: 2019-01-14
Attending: UROLOGY | Admitting: UROLOGY
Payer: MEDICARE

## 2019-01-14 ENCOUNTER — ANESTHESIA (OUTPATIENT)
Dept: OPERATING ROOM | Age: 34
End: 2019-01-14
Payer: MEDICARE

## 2019-01-14 ENCOUNTER — ANESTHESIA EVENT (OUTPATIENT)
Dept: OPERATING ROOM | Age: 34
End: 2019-01-14
Payer: MEDICARE

## 2019-01-14 VITALS
SYSTOLIC BLOOD PRESSURE: 112 MMHG | OXYGEN SATURATION: 97 % | DIASTOLIC BLOOD PRESSURE: 56 MMHG | TEMPERATURE: 98.6 F | RESPIRATION RATE: 13 BRPM

## 2019-01-14 VITALS
DIASTOLIC BLOOD PRESSURE: 72 MMHG | OXYGEN SATURATION: 99 % | TEMPERATURE: 97.1 F | SYSTOLIC BLOOD PRESSURE: 121 MMHG | HEART RATE: 71 BPM | WEIGHT: 315 LBS | BODY MASS INDEX: 45.91 KG/M2 | RESPIRATION RATE: 16 BRPM

## 2019-01-14 LAB — GLUCOSE BLD-MCNC: 118 MG/DL (ref 75–110)

## 2019-01-14 PROCEDURE — 6360000002 HC RX W HCPCS: Performed by: UROLOGY

## 2019-01-14 PROCEDURE — 6370000000 HC RX 637 (ALT 250 FOR IP): Performed by: NURSE ANESTHETIST, CERTIFIED REGISTERED

## 2019-01-14 PROCEDURE — 3700000001 HC ADD 15 MINUTES (ANESTHESIA): Performed by: UROLOGY

## 2019-01-14 PROCEDURE — 2720000010 HC SURG SUPPLY STERILE: Performed by: UROLOGY

## 2019-01-14 PROCEDURE — 3700000000 HC ANESTHESIA ATTENDED CARE: Performed by: UROLOGY

## 2019-01-14 PROCEDURE — 3600000002 HC SURGERY LEVEL 2 BASE: Performed by: UROLOGY

## 2019-01-14 PROCEDURE — 6360000002 HC RX W HCPCS

## 2019-01-14 PROCEDURE — 2709999900 HC NON-CHARGEABLE SUPPLY: Performed by: UROLOGY

## 2019-01-14 PROCEDURE — 00910 ANES TRANSURETHRAL PX NOS: CPT | Performed by: NURSE ANESTHETIST, CERTIFIED REGISTERED

## 2019-01-14 PROCEDURE — 87086 URINE CULTURE/COLONY COUNT: CPT

## 2019-01-14 PROCEDURE — 82947 ASSAY GLUCOSE BLOOD QUANT: CPT

## 2019-01-14 PROCEDURE — 7100000011 HC PHASE II RECOVERY - ADDTL 15 MIN: Performed by: UROLOGY

## 2019-01-14 PROCEDURE — 3600000012 HC SURGERY LEVEL 2 ADDTL 15MIN: Performed by: UROLOGY

## 2019-01-14 PROCEDURE — 7100000010 HC PHASE II RECOVERY - FIRST 15 MIN: Performed by: UROLOGY

## 2019-01-14 PROCEDURE — 6360000002 HC RX W HCPCS: Performed by: NURSE ANESTHETIST, CERTIFIED REGISTERED

## 2019-01-14 PROCEDURE — 2580000003 HC RX 258: Performed by: UROLOGY

## 2019-01-14 PROCEDURE — 6370000000 HC RX 637 (ALT 250 FOR IP)

## 2019-01-14 RX ORDER — SODIUM CHLORIDE, SODIUM LACTATE, POTASSIUM CHLORIDE, CALCIUM CHLORIDE 600; 310; 30; 20 MG/100ML; MG/100ML; MG/100ML; MG/100ML
INJECTION, SOLUTION INTRAVENOUS CONTINUOUS
Status: DISCONTINUED | OUTPATIENT
Start: 2019-01-14 | End: 2019-01-14 | Stop reason: HOSPADM

## 2019-01-14 RX ORDER — PROPOFOL 10 MG/ML
INJECTION, EMULSION INTRAVENOUS PRN
Status: DISCONTINUED | OUTPATIENT
Start: 2019-01-14 | End: 2019-01-14 | Stop reason: SDUPTHER

## 2019-01-14 RX ORDER — SODIUM CHLORIDE 0.9 % (FLUSH) 0.9 %
10 SYRINGE (ML) INJECTION PRN
Status: DISCONTINUED | OUTPATIENT
Start: 2019-01-14 | End: 2019-01-14 | Stop reason: HOSPADM

## 2019-01-14 RX ORDER — SODIUM CHLORIDE 0.9 % (FLUSH) 0.9 %
10 SYRINGE (ML) INJECTION EVERY 12 HOURS SCHEDULED
Status: DISCONTINUED | OUTPATIENT
Start: 2019-01-14 | End: 2019-01-14 | Stop reason: HOSPADM

## 2019-01-14 RX ORDER — PROPOFOL 10 MG/ML
INJECTION, EMULSION INTRAVENOUS CONTINUOUS PRN
Status: DISCONTINUED | OUTPATIENT
Start: 2019-01-14 | End: 2019-01-14 | Stop reason: SDUPTHER

## 2019-01-14 RX ADMIN — LIDOCAINE HYDROCHLORIDE 100 MG: 20 INJECTION INTRAVENOUS at 13:54

## 2019-01-14 RX ADMIN — ACETAMINOPHEN 650 MG: 650 SUPPOSITORY RECTAL at 14:15

## 2019-01-14 RX ADMIN — Medication 3 G: at 13:48

## 2019-01-14 RX ADMIN — PROPOFOL 100 MCG/KG/MIN: 10 INJECTION, EMULSION INTRAVENOUS at 13:54

## 2019-01-14 RX ADMIN — PROPOFOL 50 MG: 10 INJECTION, EMULSION INTRAVENOUS at 13:54

## 2019-01-14 RX ADMIN — SODIUM CHLORIDE, POTASSIUM CHLORIDE, SODIUM LACTATE AND CALCIUM CHLORIDE: 600; 310; 30; 20 INJECTION, SOLUTION INTRAVENOUS at 13:03

## 2019-01-14 ASSESSMENT — PAIN SCALES - GENERAL
PAINLEVEL_OUTOF10: 0

## 2019-01-14 ASSESSMENT — PAIN - FUNCTIONAL ASSESSMENT: PAIN_FUNCTIONAL_ASSESSMENT: 0-10

## 2019-01-15 LAB
CULTURE: NO GROWTH
Lab: NORMAL
SPECIMEN DESCRIPTION: NORMAL
STATUS: NORMAL

## 2019-01-21 ENCOUNTER — OFFICE VISIT (OUTPATIENT)
Dept: UROLOGY | Age: 34
End: 2019-01-21
Payer: MEDICARE

## 2019-01-21 ENCOUNTER — OFFICE VISIT (OUTPATIENT)
Dept: WOUND CARE | Age: 34
End: 2019-01-21
Payer: MEDICARE

## 2019-01-21 VITALS
TEMPERATURE: 98.1 F | RESPIRATION RATE: 18 BRPM | HEART RATE: 72 BPM | SYSTOLIC BLOOD PRESSURE: 138 MMHG | DIASTOLIC BLOOD PRESSURE: 76 MMHG

## 2019-01-21 VITALS
SYSTOLIC BLOOD PRESSURE: 138 MMHG | BODY MASS INDEX: 41.75 KG/M2 | WEIGHT: 315 LBS | HEIGHT: 73 IN | HEART RATE: 72 BPM | DIASTOLIC BLOOD PRESSURE: 76 MMHG

## 2019-01-21 DIAGNOSIS — I87.2 CHRONIC VENOUS INSUFFICIENCY: ICD-10-CM

## 2019-01-21 DIAGNOSIS — Z87.448 H/O URETHRAL STRICTURE: Primary | ICD-10-CM

## 2019-01-21 DIAGNOSIS — E11.51 CONTROLLED TYPE 2 DM WITH PERIPHERAL CIRCULATORY DISORDER (HCC): ICD-10-CM

## 2019-01-21 DIAGNOSIS — L97.411 ULCER OF RIGHT HEEL, LIMITED TO BREAKDOWN OF SKIN (HCC): ICD-10-CM

## 2019-01-21 DIAGNOSIS — Q87.11 HISTORY OF PRADER-WILLI SYNDROME: ICD-10-CM

## 2019-01-21 DIAGNOSIS — L97.911 ULCER OF RIGHT LOWER EXTREMITY, LIMITED TO BREAKDOWN OF SKIN (HCC): Primary | ICD-10-CM

## 2019-01-21 DIAGNOSIS — N48.83 ACQUIRED BURIED PENIS: ICD-10-CM

## 2019-01-21 DIAGNOSIS — N47.1 PHIMOSIS: ICD-10-CM

## 2019-01-21 PROCEDURE — 99213 OFFICE O/P EST LOW 20 MIN: CPT | Performed by: UROLOGY

## 2019-01-21 PROCEDURE — G8484 FLU IMMUNIZE NO ADMIN: HCPCS | Performed by: UROLOGY

## 2019-01-21 PROCEDURE — 3046F HEMOGLOBIN A1C LEVEL >9.0%: CPT | Performed by: PODIATRIST

## 2019-01-21 PROCEDURE — 97597 DBRDMT OPN WND 1ST 20 CM/<: CPT | Performed by: PODIATRIST

## 2019-01-21 PROCEDURE — 2022F DILAT RTA XM EVC RTNOPTHY: CPT | Performed by: PODIATRIST

## 2019-01-21 PROCEDURE — G8417 CALC BMI ABV UP PARAM F/U: HCPCS | Performed by: UROLOGY

## 2019-01-21 PROCEDURE — 1036F TOBACCO NON-USER: CPT | Performed by: UROLOGY

## 2019-01-21 PROCEDURE — G8427 DOCREV CUR MEDS BY ELIG CLIN: HCPCS | Performed by: UROLOGY

## 2019-01-21 PROCEDURE — G8427 DOCREV CUR MEDS BY ELIG CLIN: HCPCS | Performed by: PODIATRIST

## 2019-01-21 PROCEDURE — 99213 OFFICE O/P EST LOW 20 MIN: CPT | Performed by: PODIATRIST

## 2019-01-21 PROCEDURE — 1036F TOBACCO NON-USER: CPT | Performed by: PODIATRIST

## 2019-01-21 PROCEDURE — G8484 FLU IMMUNIZE NO ADMIN: HCPCS | Performed by: PODIATRIST

## 2019-01-21 PROCEDURE — 15275 SKIN SUB GRAFT FACE/NK/HF/G: CPT | Performed by: PODIATRIST

## 2019-01-21 PROCEDURE — G8417 CALC BMI ABV UP PARAM F/U: HCPCS | Performed by: PODIATRIST

## 2019-01-23 ENCOUNTER — TELEPHONE (OUTPATIENT)
Dept: WOUND CARE | Age: 34
End: 2019-01-23

## 2019-01-28 ENCOUNTER — OFFICE VISIT (OUTPATIENT)
Dept: WOUND CARE | Age: 34
End: 2019-01-28
Payer: MEDICARE

## 2019-01-28 VITALS — HEART RATE: 72 BPM | TEMPERATURE: 96 F | DIASTOLIC BLOOD PRESSURE: 68 MMHG | SYSTOLIC BLOOD PRESSURE: 130 MMHG

## 2019-01-28 DIAGNOSIS — E11.51 CONTROLLED TYPE 2 DM WITH PERIPHERAL CIRCULATORY DISORDER (HCC): ICD-10-CM

## 2019-01-28 DIAGNOSIS — L97.911 ULCER OF RIGHT LOWER EXTREMITY, LIMITED TO BREAKDOWN OF SKIN (HCC): ICD-10-CM

## 2019-01-28 DIAGNOSIS — L97.411 ULCER OF RIGHT HEEL, LIMITED TO BREAKDOWN OF SKIN (HCC): Primary | ICD-10-CM

## 2019-01-28 PROCEDURE — 15275 SKIN SUB GRAFT FACE/NK/HF/G: CPT | Performed by: PODIATRIST

## 2019-01-28 PROCEDURE — 97597 DBRDMT OPN WND 1ST 20 CM/<: CPT | Performed by: PODIATRIST

## 2019-01-28 PROCEDURE — 99999 PR OFFICE/OUTPT VISIT,PROCEDURE ONLY: CPT | Performed by: PODIATRIST

## 2019-01-30 ENCOUNTER — TELEPHONE (OUTPATIENT)
Dept: WOUND CARE | Age: 34
End: 2019-01-30

## 2019-02-04 ENCOUNTER — OFFICE VISIT (OUTPATIENT)
Dept: WOUND CARE | Age: 34
End: 2019-02-04
Payer: MEDICARE

## 2019-02-04 VITALS
SYSTOLIC BLOOD PRESSURE: 124 MMHG | BODY MASS INDEX: 42.66 KG/M2 | HEART RATE: 80 BPM | DIASTOLIC BLOOD PRESSURE: 70 MMHG | HEIGHT: 72 IN | WEIGHT: 315 LBS | TEMPERATURE: 98.1 F

## 2019-02-04 DIAGNOSIS — L97.411 ULCER OF RIGHT HEEL, LIMITED TO BREAKDOWN OF SKIN (HCC): Primary | ICD-10-CM

## 2019-02-04 DIAGNOSIS — L97.911 ULCER OF RIGHT LOWER EXTREMITY, LIMITED TO BREAKDOWN OF SKIN (HCC): ICD-10-CM

## 2019-02-04 DIAGNOSIS — E11.49 DM (DIABETES MELLITUS), TYPE 2 WITH NEUROLOGICAL COMPLICATIONS (HCC): ICD-10-CM

## 2019-02-04 PROCEDURE — 99999 PR OFFICE/OUTPT VISIT,PROCEDURE ONLY: CPT | Performed by: PODIATRIST

## 2019-02-04 PROCEDURE — 97597 DBRDMT OPN WND 1ST 20 CM/<: CPT | Performed by: PODIATRIST

## 2019-02-04 PROCEDURE — 15275 SKIN SUB GRAFT FACE/NK/HF/G: CPT | Performed by: PODIATRIST

## 2019-02-05 ENCOUNTER — TELEPHONE (OUTPATIENT)
Dept: WOUND CARE | Age: 34
End: 2019-02-05

## 2019-02-13 ENCOUNTER — NURSE ONLY (OUTPATIENT)
Dept: WOUND CARE | Age: 34
End: 2019-02-13
Payer: MEDICARE

## 2019-02-13 VITALS
BODY MASS INDEX: 46.25 KG/M2 | DIASTOLIC BLOOD PRESSURE: 70 MMHG | TEMPERATURE: 97.8 F | HEART RATE: 80 BPM | SYSTOLIC BLOOD PRESSURE: 124 MMHG | WEIGHT: 315 LBS

## 2019-02-13 DIAGNOSIS — L97.911 NON-PRESSURE CHRONIC ULCER OF RIGHT LOWER LEG, LIMITED TO BREAKDOWN OF SKIN (HCC): ICD-10-CM

## 2019-02-13 DIAGNOSIS — L97.411 ULCER OF RIGHT HEEL, LIMITED TO BREAKDOWN OF SKIN (HCC): Primary | ICD-10-CM

## 2019-02-13 PROCEDURE — 99211 OFF/OP EST MAY X REQ PHY/QHP: CPT | Performed by: PODIATRIST

## 2019-02-14 ENCOUNTER — TELEPHONE (OUTPATIENT)
Dept: WOUND CARE | Age: 34
End: 2019-02-14

## 2019-02-19 ENCOUNTER — OFFICE VISIT (OUTPATIENT)
Dept: WOUND CARE | Age: 34
End: 2019-02-19
Payer: MEDICARE

## 2019-02-19 ENCOUNTER — OFFICE VISIT (OUTPATIENT)
Dept: ORTHOPEDIC SURGERY | Age: 34
End: 2019-02-19
Payer: MEDICARE

## 2019-02-19 VITALS
HEART RATE: 66 BPM | DIASTOLIC BLOOD PRESSURE: 60 MMHG | TEMPERATURE: 97.6 F | SYSTOLIC BLOOD PRESSURE: 118 MMHG | BODY MASS INDEX: 46.52 KG/M2 | WEIGHT: 315 LBS

## 2019-02-19 VITALS
WEIGHT: 315 LBS | HEART RATE: 76 BPM | HEIGHT: 72 IN | DIASTOLIC BLOOD PRESSURE: 84 MMHG | SYSTOLIC BLOOD PRESSURE: 128 MMHG | BODY MASS INDEX: 42.66 KG/M2

## 2019-02-19 DIAGNOSIS — L97.911 NON-PRESSURE CHRONIC ULCER OF RIGHT LOWER LEG, LIMITED TO BREAKDOWN OF SKIN (HCC): ICD-10-CM

## 2019-02-19 DIAGNOSIS — L97.411 ULCER OF RIGHT HEEL, LIMITED TO BREAKDOWN OF SKIN (HCC): Primary | ICD-10-CM

## 2019-02-19 DIAGNOSIS — M25.361 KNEE INSTABILITY, RIGHT: Primary | ICD-10-CM

## 2019-02-19 DIAGNOSIS — E11.49 DM (DIABETES MELLITUS), TYPE 2 WITH NEUROLOGICAL COMPLICATIONS (HCC): ICD-10-CM

## 2019-02-19 PROCEDURE — 15275 SKIN SUB GRAFT FACE/NK/HF/G: CPT | Performed by: PODIATRIST

## 2019-02-19 PROCEDURE — G8417 CALC BMI ABV UP PARAM F/U: HCPCS | Performed by: PHYSICIAN ASSISTANT

## 2019-02-19 PROCEDURE — 1036F TOBACCO NON-USER: CPT | Performed by: PHYSICIAN ASSISTANT

## 2019-02-19 PROCEDURE — G8427 DOCREV CUR MEDS BY ELIG CLIN: HCPCS | Performed by: PHYSICIAN ASSISTANT

## 2019-02-19 PROCEDURE — 97597 DBRDMT OPN WND 1ST 20 CM/<: CPT | Performed by: PODIATRIST

## 2019-02-19 PROCEDURE — G8484 FLU IMMUNIZE NO ADMIN: HCPCS | Performed by: PHYSICIAN ASSISTANT

## 2019-02-19 PROCEDURE — 99212 OFFICE O/P EST SF 10 MIN: CPT | Performed by: PHYSICIAN ASSISTANT

## 2019-02-22 ENCOUNTER — TELEPHONE (OUTPATIENT)
Dept: WOUND CARE | Age: 34
End: 2019-02-22

## 2019-02-25 ENCOUNTER — OFFICE VISIT (OUTPATIENT)
Dept: WOUND CARE | Age: 34
End: 2019-02-25
Payer: MEDICARE

## 2019-02-25 ENCOUNTER — HOSPITAL ENCOUNTER (OUTPATIENT)
Age: 34
Setting detail: SPECIMEN
Discharge: HOME | End: 2019-02-25
Payer: MEDICARE

## 2019-02-25 VITALS — TEMPERATURE: 96.5 F | DIASTOLIC BLOOD PRESSURE: 70 MMHG | HEART RATE: 80 BPM | SYSTOLIC BLOOD PRESSURE: 130 MMHG

## 2019-02-25 DIAGNOSIS — L97.411 ULCER OF RIGHT HEEL, LIMITED TO BREAKDOWN OF SKIN (HCC): Primary | ICD-10-CM

## 2019-02-25 DIAGNOSIS — L97.411 ULCER OF RIGHT HEEL, LIMITED TO BREAKDOWN OF SKIN (HCC): ICD-10-CM

## 2019-02-25 PROCEDURE — 87176 TISSUE HOMOGENIZATION CULTR: CPT

## 2019-02-25 PROCEDURE — 87070 CULTURE OTHR SPECIMN AEROBIC: CPT

## 2019-02-25 PROCEDURE — 86403 PARTICLE AGGLUT ANTBDY SCRN: CPT

## 2019-02-25 PROCEDURE — 87077 CULTURE AEROBIC IDENTIFY: CPT

## 2019-02-25 PROCEDURE — 97597 DBRDMT OPN WND 1ST 20 CM/<: CPT | Performed by: PODIATRIST

## 2019-02-25 PROCEDURE — 99999 PR OFFICE/OUTPT VISIT,PROCEDURE ONLY: CPT | Performed by: PODIATRIST

## 2019-02-25 PROCEDURE — 87075 CULTR BACTERIA EXCEPT BLOOD: CPT

## 2019-02-25 PROCEDURE — 87186 SC STD MICRODIL/AGAR DIL: CPT

## 2019-02-25 PROCEDURE — 87205 SMEAR GRAM STAIN: CPT

## 2019-02-28 LAB
CULTURE: ABNORMAL
DIRECT EXAM: ABNORMAL
DIRECT EXAM: ABNORMAL
Lab: ABNORMAL
SPECIMEN DESCRIPTION: ABNORMAL

## 2019-03-04 ENCOUNTER — HOSPITAL ENCOUNTER (INPATIENT)
Age: 34
LOS: 3 days | Discharge: HOME HEALTH | DRG: 623 | End: 2019-03-07
Attending: INTERNAL MEDICINE | Admitting: INTERNAL MEDICINE
Payer: MEDICARE

## 2019-03-04 ENCOUNTER — TELEPHONE (OUTPATIENT)
Dept: PODIATRY | Age: 34
End: 2019-03-04

## 2019-03-04 ENCOUNTER — APPOINTMENT (OUTPATIENT)
Dept: GENERAL RADIOLOGY | Age: 34
DRG: 623 | End: 2019-03-04
Attending: INTERNAL MEDICINE
Payer: MEDICARE

## 2019-03-04 PROBLEM — L03.90 CELLULITIS: Status: ACTIVE | Noted: 2019-03-04

## 2019-03-04 PROBLEM — L97.414: Status: ACTIVE | Noted: 2018-11-12

## 2019-03-04 LAB
GLUCOSE BLD-MCNC: 112 MG/DL (ref 75–110)
GLUCOSE BLD-MCNC: 123 MG/DL (ref 75–110)

## 2019-03-04 PROCEDURE — 73630 X-RAY EXAM OF FOOT: CPT

## 2019-03-04 PROCEDURE — 94760 N-INVAS EAR/PLS OXIMETRY 1: CPT

## 2019-03-04 PROCEDURE — 2580000003 HC RX 258: Performed by: INTERNAL MEDICINE

## 2019-03-04 PROCEDURE — 6360000002 HC RX W HCPCS: Performed by: INTERNAL MEDICINE

## 2019-03-04 PROCEDURE — 2500000003 HC RX 250 WO HCPCS: Performed by: INTERNAL MEDICINE

## 2019-03-04 PROCEDURE — 99232 SBSQ HOSP IP/OBS MODERATE 35: CPT | Performed by: PODIATRIST

## 2019-03-04 PROCEDURE — 6370000000 HC RX 637 (ALT 250 FOR IP): Performed by: INTERNAL MEDICINE

## 2019-03-04 PROCEDURE — 0JBQ0ZZ EXCISION OF RIGHT FOOT SUBCUTANEOUS TISSUE AND FASCIA, OPEN APPROACH: ICD-10-PCS | Performed by: PODIATRIST

## 2019-03-04 PROCEDURE — 99232 SBSQ HOSP IP/OBS MODERATE 35: CPT | Performed by: INTERNAL MEDICINE

## 2019-03-04 PROCEDURE — 94664 DEMO&/EVAL PT USE INHALER: CPT

## 2019-03-04 PROCEDURE — 82947 ASSAY GLUCOSE BLOOD QUANT: CPT

## 2019-03-04 PROCEDURE — 1200000000 HC SEMI PRIVATE

## 2019-03-04 PROCEDURE — 94150 VITAL CAPACITY TEST: CPT

## 2019-03-04 RX ORDER — ALBUTEROL SULFATE 2.5 MG/3ML
2.5 SOLUTION RESPIRATORY (INHALATION)
Status: DISCONTINUED | OUTPATIENT
Start: 2019-03-04 | End: 2019-03-07 | Stop reason: HOSPADM

## 2019-03-04 RX ORDER — LEVOFLOXACIN 5 MG/ML
500 INJECTION, SOLUTION INTRAVENOUS EVERY 24 HOURS
Status: DISCONTINUED | OUTPATIENT
Start: 2019-03-04 | End: 2019-03-04

## 2019-03-04 RX ORDER — DEXTROSE MONOHYDRATE 50 MG/ML
100 INJECTION, SOLUTION INTRAVENOUS PRN
Status: DISCONTINUED | OUTPATIENT
Start: 2019-03-04 | End: 2019-03-07 | Stop reason: HOSPADM

## 2019-03-04 RX ORDER — INSULIN GLARGINE 100 [IU]/ML
48 INJECTION, SOLUTION SUBCUTANEOUS 2 TIMES DAILY
Status: DISCONTINUED | OUTPATIENT
Start: 2019-03-04 | End: 2019-03-07 | Stop reason: HOSPADM

## 2019-03-04 RX ORDER — NICOTINE POLACRILEX 4 MG
15 LOZENGE BUCCAL PRN
Status: DISCONTINUED | OUTPATIENT
Start: 2019-03-04 | End: 2019-03-07 | Stop reason: HOSPADM

## 2019-03-04 RX ORDER — CLOTRIMAZOLE 1 %
CREAM (GRAM) TOPICAL 2 TIMES DAILY
Status: DISCONTINUED | OUTPATIENT
Start: 2019-03-04 | End: 2019-03-07 | Stop reason: HOSPADM

## 2019-03-04 RX ORDER — OXYBUTYNIN CHLORIDE 5 MG/1
5 TABLET, EXTENDED RELEASE ORAL DAILY
Status: DISCONTINUED | OUTPATIENT
Start: 2019-03-04 | End: 2019-03-04

## 2019-03-04 RX ORDER — ACETAMINOPHEN 325 MG/1
650 TABLET ORAL EVERY 4 HOURS PRN
Status: DISCONTINUED | OUTPATIENT
Start: 2019-03-04 | End: 2019-03-07 | Stop reason: HOSPADM

## 2019-03-04 RX ORDER — CARBAMAZEPINE 100 MG/1
200 TABLET, EXTENDED RELEASE ORAL 2 TIMES DAILY
Status: DISCONTINUED | OUTPATIENT
Start: 2019-03-04 | End: 2019-03-06 | Stop reason: RX

## 2019-03-04 RX ORDER — ARIPIPRAZOLE 5 MG/1
10 TABLET ORAL DAILY
Status: DISCONTINUED | OUTPATIENT
Start: 2019-03-04 | End: 2019-03-07 | Stop reason: HOSPADM

## 2019-03-04 RX ORDER — CLINDAMYCIN PHOSPHATE 600 MG/50ML
600 INJECTION INTRAVENOUS EVERY 8 HOURS
Status: DISCONTINUED | OUTPATIENT
Start: 2019-03-04 | End: 2019-03-07 | Stop reason: HOSPADM

## 2019-03-04 RX ORDER — SODIUM CHLORIDE 0.9 % (FLUSH) 0.9 %
10 SYRINGE (ML) INJECTION PRN
Status: DISCONTINUED | OUTPATIENT
Start: 2019-03-04 | End: 2019-03-07 | Stop reason: HOSPADM

## 2019-03-04 RX ORDER — ROSUVASTATIN CALCIUM 5 MG/1
40 TABLET, COATED ORAL EVERY EVENING
Status: DISCONTINUED | OUTPATIENT
Start: 2019-03-04 | End: 2019-03-07 | Stop reason: HOSPADM

## 2019-03-04 RX ORDER — ALBUTEROL SULFATE 2.5 MG/3ML
2.5 SOLUTION RESPIRATORY (INHALATION) EVERY 6 HOURS PRN
Status: DISCONTINUED | OUTPATIENT
Start: 2019-03-04 | End: 2019-03-07 | Stop reason: HOSPADM

## 2019-03-04 RX ORDER — ALBUTEROL SULFATE 2.5 MG/3ML
2.5 SOLUTION RESPIRATORY (INHALATION)
Status: DISCONTINUED | OUTPATIENT
Start: 2019-03-04 | End: 2019-03-04

## 2019-03-04 RX ORDER — SODIUM CHLORIDE 0.9 % (FLUSH) 0.9 %
10 SYRINGE (ML) INJECTION EVERY 12 HOURS SCHEDULED
Status: DISCONTINUED | OUTPATIENT
Start: 2019-03-04 | End: 2019-03-07 | Stop reason: HOSPADM

## 2019-03-04 RX ORDER — SODIUM CHLORIDE FOR INHALATION 0.9 %
3 VIAL, NEBULIZER (ML) INHALATION
Status: DISCONTINUED | OUTPATIENT
Start: 2019-03-04 | End: 2019-03-04 | Stop reason: ALTCHOICE

## 2019-03-04 RX ORDER — LEVOTHYROXINE SODIUM 0.03 MG/1
50 TABLET ORAL DAILY
Status: DISCONTINUED | OUTPATIENT
Start: 2019-03-04 | End: 2019-03-07 | Stop reason: HOSPADM

## 2019-03-04 RX ORDER — DEXTROSE MONOHYDRATE 25 G/50ML
12.5 INJECTION, SOLUTION INTRAVENOUS PRN
Status: DISCONTINUED | OUTPATIENT
Start: 2019-03-04 | End: 2019-03-07 | Stop reason: HOSPADM

## 2019-03-04 RX ADMIN — LEVOFLOXACIN 500 MG: 5 INJECTION, SOLUTION INTRAVENOUS at 17:02

## 2019-03-04 RX ADMIN — INSULIN LISPRO 10 UNITS: 100 INJECTION, SOLUTION INTRAVENOUS; SUBCUTANEOUS at 17:00

## 2019-03-04 RX ADMIN — ARIPIPRAZOLE 10 MG: 5 TABLET ORAL at 15:16

## 2019-03-04 RX ADMIN — Medication 10 ML: at 20:32

## 2019-03-04 RX ADMIN — CLOTRIMAZOLE: 10 CREAM TOPICAL at 20:30

## 2019-03-04 RX ADMIN — VANCOMYCIN HYDROCHLORIDE 1500 MG: 1 INJECTION, POWDER, LYOPHILIZED, FOR SOLUTION INTRAVENOUS at 22:08

## 2019-03-04 RX ADMIN — CLINDAMYCIN PHOSPHATE 600 MG: 600 INJECTION, SOLUTION INTRAVENOUS at 15:16

## 2019-03-04 RX ADMIN — INSULIN GLARGINE 48 UNITS: 100 INJECTION, SOLUTION SUBCUTANEOUS at 17:00

## 2019-03-04 RX ADMIN — ROSUVASTATIN CALCIUM 40 MG: 5 TABLET, FILM COATED ORAL at 20:30

## 2019-03-04 RX ADMIN — CARBAMAZEPINE 200 MG: 100 TABLET, EXTENDED RELEASE ORAL at 20:37

## 2019-03-05 ENCOUNTER — APPOINTMENT (OUTPATIENT)
Dept: GENERAL RADIOLOGY | Age: 34
DRG: 623 | End: 2019-03-05
Attending: INTERNAL MEDICINE
Payer: MEDICARE

## 2019-03-05 ENCOUNTER — ANESTHESIA EVENT (OUTPATIENT)
Dept: OPERATING ROOM | Age: 34
DRG: 623 | End: 2019-03-05
Payer: MEDICARE

## 2019-03-05 ENCOUNTER — ANESTHESIA (OUTPATIENT)
Dept: OPERATING ROOM | Age: 34
DRG: 623 | End: 2019-03-05
Payer: MEDICARE

## 2019-03-05 VITALS
DIASTOLIC BLOOD PRESSURE: 52 MMHG | RESPIRATION RATE: 13 BRPM | SYSTOLIC BLOOD PRESSURE: 105 MMHG | OXYGEN SATURATION: 97 %

## 2019-03-05 PROBLEM — M86.171 OSTEOMYELITIS OF ANKLE OR FOOT, ACUTE, RIGHT (HCC): Status: ACTIVE | Noted: 2019-03-05

## 2019-03-05 LAB
ANION GAP SERPL CALCULATED.3IONS-SCNC: 12 MMOL/L (ref 9–17)
BUN BLDV-MCNC: 19 MG/DL (ref 6–20)
BUN/CREAT BLD: 37 (ref 9–20)
C-PEPTIDE: 2.5 NG/ML (ref 1.1–4.4)
CALCIUM SERPL-MCNC: 9.2 MG/DL (ref 8.6–10.4)
CHLORIDE BLD-SCNC: 96 MMOL/L (ref 98–107)
CO2: 30 MMOL/L (ref 20–31)
CREAT SERPL-MCNC: 0.52 MG/DL (ref 0.7–1.2)
ESTIMATED AVERAGE GLUCOSE: 140 MG/DL
GFR AFRICAN AMERICAN: >60 ML/MIN
GFR NON-AFRICAN AMERICAN: >60 ML/MIN
GFR SERPL CREATININE-BSD FRML MDRD: ABNORMAL ML/MIN/{1.73_M2}
GFR SERPL CREATININE-BSD FRML MDRD: ABNORMAL ML/MIN/{1.73_M2}
GLUCOSE BLD-MCNC: 115 MG/DL (ref 75–110)
GLUCOSE BLD-MCNC: 135 MG/DL (ref 70–99)
GLUCOSE BLD-MCNC: 173 MG/DL (ref 75–110)
GLUCOSE BLD-MCNC: 241 MG/DL (ref 75–110)
GLUCOSE BLD-MCNC: 268 MG/DL (ref 75–110)
GLUCOSE BLD-MCNC: 99 MG/DL (ref 75–110)
HBA1C MFR BLD: 6.5 % (ref 4.8–5.9)
HCT VFR BLD CALC: 38.6 % (ref 41–53)
HEMOGLOBIN: 12.4 G/DL (ref 13.5–17.5)
MCH RBC QN AUTO: 28.4 PG (ref 26–34)
MCHC RBC AUTO-ENTMCNC: 32.1 G/DL (ref 31–37)
MCV RBC AUTO: 88.3 FL (ref 80–100)
NRBC AUTOMATED: ABNORMAL PER 100 WBC
PDW BLD-RTO: 13.6 % (ref 11–14.5)
PLATELET # BLD: 252 K/UL (ref 140–450)
PMV BLD AUTO: 8.7 FL (ref 6–12)
POTASSIUM SERPL-SCNC: 4.3 MMOL/L (ref 3.7–5.3)
RBC # BLD: 4.37 M/UL (ref 4.5–5.9)
SODIUM BLD-SCNC: 138 MMOL/L (ref 135–144)
THYROXINE, FREE: 0.82 NG/DL (ref 0.93–1.7)
TSH SERPL DL<=0.05 MIU/L-ACNC: 3.64 MIU/L (ref 0.3–5)
VANCOMYCIN TROUGH DATE LAST DOSE: ABNORMAL
VANCOMYCIN TROUGH DOSE AMOUNT: ABNORMAL
VANCOMYCIN TROUGH TIME LAST DOSE: ABNORMAL
VANCOMYCIN TROUGH: 6.2 UG/ML (ref 10–20)
WBC # BLD: 6.9 K/UL (ref 3.5–11)

## 2019-03-05 PROCEDURE — 2580000003 HC RX 258: Performed by: INTERNAL MEDICINE

## 2019-03-05 PROCEDURE — 88311 DECALCIFY TISSUE: CPT

## 2019-03-05 PROCEDURE — 11042 DBRDMT SUBQ TIS 1ST 20SQCM/<: CPT | Performed by: PODIATRIST

## 2019-03-05 PROCEDURE — 6370000000 HC RX 637 (ALT 250 FOR IP): Performed by: PODIATRIST

## 2019-03-05 PROCEDURE — 2580000003 HC RX 258: Performed by: NURSE ANESTHETIST, CERTIFIED REGISTERED

## 2019-03-05 PROCEDURE — 2709999900 HC NON-CHARGEABLE SUPPLY: Performed by: PODIATRIST

## 2019-03-05 PROCEDURE — 28120 PART REMOVAL OF ANKLE/HEEL: CPT | Performed by: PODIATRIST

## 2019-03-05 PROCEDURE — 0QBL0ZZ EXCISION OF RIGHT TARSAL, OPEN APPROACH: ICD-10-PCS | Performed by: PODIATRIST

## 2019-03-05 PROCEDURE — 3600000002 HC SURGERY LEVEL 2 BASE: Performed by: PODIATRIST

## 2019-03-05 PROCEDURE — 87075 CULTR BACTERIA EXCEPT BLOOD: CPT

## 2019-03-05 PROCEDURE — 2500000003 HC RX 250 WO HCPCS: Performed by: PODIATRIST

## 2019-03-05 PROCEDURE — 87070 CULTURE OTHR SPECIMN AEROBIC: CPT

## 2019-03-05 PROCEDURE — 3600000012 HC SURGERY LEVEL 2 ADDTL 15MIN: Performed by: PODIATRIST

## 2019-03-05 PROCEDURE — 6360000002 HC RX W HCPCS: Performed by: PODIATRIST

## 2019-03-05 PROCEDURE — 36415 COLL VENOUS BLD VENIPUNCTURE: CPT

## 2019-03-05 PROCEDURE — 3700000001 HC ADD 15 MINUTES (ANESTHESIA): Performed by: PODIATRIST

## 2019-03-05 PROCEDURE — 82947 ASSAY GLUCOSE BLOOD QUANT: CPT

## 2019-03-05 PROCEDURE — 85027 COMPLETE CBC AUTOMATED: CPT

## 2019-03-05 PROCEDURE — 3700000000 HC ANESTHESIA ATTENDED CARE: Performed by: PODIATRIST

## 2019-03-05 PROCEDURE — 87176 TISSUE HOMOGENIZATION CULTR: CPT

## 2019-03-05 PROCEDURE — 88304 TISSUE EXAM BY PATHOLOGIST: CPT

## 2019-03-05 PROCEDURE — 1200000000 HC SEMI PRIVATE

## 2019-03-05 PROCEDURE — 01480 ANES OPEN PX LOWER L/A/F NOS: CPT | Performed by: NURSE ANESTHETIST, CERTIFIED REGISTERED

## 2019-03-05 PROCEDURE — 6360000002 HC RX W HCPCS: Performed by: INTERNAL MEDICINE

## 2019-03-05 PROCEDURE — 6370000000 HC RX 637 (ALT 250 FOR IP): Performed by: INTERNAL MEDICINE

## 2019-03-05 PROCEDURE — C1713 ANCHOR/SCREW BN/BN,TIS/BN: HCPCS | Performed by: PODIATRIST

## 2019-03-05 PROCEDURE — 2580000003 HC RX 258: Performed by: PODIATRIST

## 2019-03-05 PROCEDURE — 73630 X-RAY EXAM OF FOOT: CPT

## 2019-03-05 PROCEDURE — 99232 SBSQ HOSP IP/OBS MODERATE 35: CPT | Performed by: INTERNAL MEDICINE

## 2019-03-05 PROCEDURE — 84681 ASSAY OF C-PEPTIDE: CPT

## 2019-03-05 PROCEDURE — 2500000003 HC RX 250 WO HCPCS: Performed by: INTERNAL MEDICINE

## 2019-03-05 PROCEDURE — 80202 ASSAY OF VANCOMYCIN: CPT

## 2019-03-05 PROCEDURE — 7100000001 HC PACU RECOVERY - ADDTL 15 MIN: Performed by: PODIATRIST

## 2019-03-05 PROCEDURE — 80048 BASIC METABOLIC PNL TOTAL CA: CPT

## 2019-03-05 PROCEDURE — 83036 HEMOGLOBIN GLYCOSYLATED A1C: CPT

## 2019-03-05 PROCEDURE — 84439 ASSAY OF FREE THYROXINE: CPT

## 2019-03-05 PROCEDURE — 6360000002 HC RX W HCPCS

## 2019-03-05 PROCEDURE — 94761 N-INVAS EAR/PLS OXIMETRY MLT: CPT

## 2019-03-05 PROCEDURE — 7100000000 HC PACU RECOVERY - FIRST 15 MIN: Performed by: PODIATRIST

## 2019-03-05 PROCEDURE — 87205 SMEAR GRAM STAIN: CPT

## 2019-03-05 PROCEDURE — 84443 ASSAY THYROID STIM HORMONE: CPT

## 2019-03-05 PROCEDURE — 6360000002 HC RX W HCPCS: Performed by: NURSE ANESTHETIST, CERTIFIED REGISTERED

## 2019-03-05 DEVICE — RESORBABLE MINI BEAD KIT
Type: IMPLANTABLE DEVICE | Site: HEEL | Status: FUNCTIONAL
Brand: OSTEOSET

## 2019-03-05 RX ORDER — FENTANYL CITRATE 50 UG/ML
INJECTION, SOLUTION INTRAMUSCULAR; INTRAVENOUS PRN
Status: DISCONTINUED | OUTPATIENT
Start: 2019-03-05 | End: 2019-03-05 | Stop reason: SDUPTHER

## 2019-03-05 RX ORDER — DEXTROSE, SODIUM CHLORIDE, SODIUM LACTATE, POTASSIUM CHLORIDE, AND CALCIUM CHLORIDE 5; .6; .31; .03; .02 G/100ML; G/100ML; G/100ML; G/100ML; G/100ML
INJECTION, SOLUTION INTRAVENOUS CONTINUOUS PRN
Status: DISCONTINUED | OUTPATIENT
Start: 2019-03-05 | End: 2019-03-05 | Stop reason: SDUPTHER

## 2019-03-05 RX ORDER — SODIUM CHLORIDE, SODIUM LACTATE, POTASSIUM CHLORIDE, CALCIUM CHLORIDE 600; 310; 30; 20 MG/100ML; MG/100ML; MG/100ML; MG/100ML
INJECTION, SOLUTION INTRAVENOUS CONTINUOUS PRN
Status: DISCONTINUED | OUTPATIENT
Start: 2019-03-05 | End: 2019-03-05 | Stop reason: SDUPTHER

## 2019-03-05 RX ORDER — ONDANSETRON 2 MG/ML
INJECTION INTRAMUSCULAR; INTRAVENOUS PRN
Status: DISCONTINUED | OUTPATIENT
Start: 2019-03-05 | End: 2019-03-05 | Stop reason: SDUPTHER

## 2019-03-05 RX ORDER — MORPHINE SULFATE 2 MG/ML
2 INJECTION, SOLUTION INTRAMUSCULAR; INTRAVENOUS EVERY 5 MIN PRN
Status: DISCONTINUED | OUTPATIENT
Start: 2019-03-05 | End: 2019-03-05

## 2019-03-05 RX ORDER — DEXAMETHASONE SODIUM PHOSPHATE 4 MG/ML
INJECTION, SOLUTION INTRA-ARTICULAR; INTRALESIONAL; INTRAMUSCULAR; INTRAVENOUS; SOFT TISSUE PRN
Status: DISCONTINUED | OUTPATIENT
Start: 2019-03-05 | End: 2019-03-05 | Stop reason: SDUPTHER

## 2019-03-05 RX ORDER — SODIUM CHLORIDE, SODIUM LACTATE, POTASSIUM CHLORIDE, CALCIUM CHLORIDE 600; 310; 30; 20 MG/100ML; MG/100ML; MG/100ML; MG/100ML
INJECTION, SOLUTION INTRAVENOUS CONTINUOUS
Status: DISCONTINUED | OUTPATIENT
Start: 2019-03-05 | End: 2019-03-05

## 2019-03-05 RX ORDER — DIPHENHYDRAMINE HYDROCHLORIDE 50 MG/ML
12.5 INJECTION INTRAMUSCULAR; INTRAVENOUS
Status: DISCONTINUED | OUTPATIENT
Start: 2019-03-05 | End: 2019-03-05

## 2019-03-05 RX ORDER — VANCOMYCIN HYDROCHLORIDE 1 G/20ML
INJECTION, POWDER, LYOPHILIZED, FOR SOLUTION INTRAVENOUS PRN
Status: DISCONTINUED | OUTPATIENT
Start: 2019-03-05 | End: 2019-03-05 | Stop reason: ALTCHOICE

## 2019-03-05 RX ORDER — HYDROCODONE BITARTRATE AND ACETAMINOPHEN 5; 325 MG/1; MG/1
1 TABLET ORAL PRN
Status: DISCONTINUED | OUTPATIENT
Start: 2019-03-05 | End: 2019-03-05

## 2019-03-05 RX ORDER — PROPOFOL 10 MG/ML
INJECTION, EMULSION INTRAVENOUS PRN
Status: DISCONTINUED | OUTPATIENT
Start: 2019-03-05 | End: 2019-03-05 | Stop reason: SDUPTHER

## 2019-03-05 RX ORDER — ONDANSETRON 2 MG/ML
4 INJECTION INTRAMUSCULAR; INTRAVENOUS
Status: DISCONTINUED | OUTPATIENT
Start: 2019-03-05 | End: 2019-03-05

## 2019-03-05 RX ORDER — HYDROCODONE BITARTRATE AND ACETAMINOPHEN 5; 325 MG/1; MG/1
2 TABLET ORAL PRN
Status: DISCONTINUED | OUTPATIENT
Start: 2019-03-05 | End: 2019-03-05

## 2019-03-05 RX ADMIN — CLINDAMYCIN PHOSPHATE 600 MG: 600 INJECTION, SOLUTION INTRAVENOUS at 15:27

## 2019-03-05 RX ADMIN — ONDANSETRON 4 MG: 2 INJECTION INTRAMUSCULAR; INTRAVENOUS at 13:47

## 2019-03-05 RX ADMIN — VANCOMYCIN HYDROCHLORIDE 1500 MG: 1 INJECTION, POWDER, LYOPHILIZED, FOR SOLUTION INTRAVENOUS at 05:30

## 2019-03-05 RX ADMIN — INSULIN LISPRO 6 UNITS: 100 INJECTION, SOLUTION INTRAVENOUS; SUBCUTANEOUS at 18:40

## 2019-03-05 RX ADMIN — CARBAMAZEPINE 200 MG: 100 TABLET, EXTENDED RELEASE ORAL at 08:49

## 2019-03-05 RX ADMIN — INSULIN GLARGINE 48 UNITS: 100 INJECTION, SOLUTION SUBCUTANEOUS at 21:21

## 2019-03-05 RX ADMIN — Medication 10 ML: at 15:28

## 2019-03-05 RX ADMIN — DEXAMETHASONE SODIUM PHOSPHATE 4 MG: 4 INJECTION, SOLUTION INTRAMUSCULAR; INTRAVENOUS at 13:05

## 2019-03-05 RX ADMIN — ENOXAPARIN SODIUM 30 MG: 30 INJECTION SUBCUTANEOUS at 15:28

## 2019-03-05 RX ADMIN — CLOTRIMAZOLE: 10 CREAM TOPICAL at 21:21

## 2019-03-05 RX ADMIN — CLINDAMYCIN PHOSPHATE 600 MG: 600 INJECTION, SOLUTION INTRAVENOUS at 07:32

## 2019-03-05 RX ADMIN — Medication 10 ML: at 21:22

## 2019-03-05 RX ADMIN — SODIUM CHLORIDE, SODIUM LACTATE, POTASSIUM CHLORIDE, CALCIUM CHLORIDE AND DEXTROSE MONOHYDRATE: 5; 600; 310; 30; 20 INJECTION, SOLUTION INTRAVENOUS at 13:09

## 2019-03-05 RX ADMIN — PROPOFOL 30 MG: 10 INJECTION, EMULSION INTRAVENOUS at 13:06

## 2019-03-05 RX ADMIN — CLINDAMYCIN PHOSPHATE 600 MG: 600 INJECTION, SOLUTION INTRAVENOUS at 22:50

## 2019-03-05 RX ADMIN — VANCOMYCIN HYDROCHLORIDE 1500 MG: 1 INJECTION, POWDER, LYOPHILIZED, FOR SOLUTION INTRAVENOUS at 23:24

## 2019-03-05 RX ADMIN — ENOXAPARIN SODIUM 30 MG: 30 INJECTION SUBCUTANEOUS at 21:21

## 2019-03-05 RX ADMIN — CLINDAMYCIN PHOSPHATE 600 MG: 600 INJECTION, SOLUTION INTRAVENOUS at 00:01

## 2019-03-05 RX ADMIN — ARIPIPRAZOLE 10 MG: 5 TABLET ORAL at 08:48

## 2019-03-05 RX ADMIN — PROPOFOL 140 MG: 10 INJECTION, EMULSION INTRAVENOUS at 12:56

## 2019-03-05 RX ADMIN — INSULIN LISPRO 10 UNITS: 100 INJECTION, SOLUTION INTRAVENOUS; SUBCUTANEOUS at 18:40

## 2019-03-05 RX ADMIN — PROPOFOL 30 MG: 10 INJECTION, EMULSION INTRAVENOUS at 13:00

## 2019-03-05 RX ADMIN — CLOTRIMAZOLE: 10 CREAM TOPICAL at 08:48

## 2019-03-05 RX ADMIN — SODIUM CHLORIDE, POTASSIUM CHLORIDE, SODIUM LACTATE AND CALCIUM CHLORIDE: 600; 310; 30; 20 INJECTION, SOLUTION INTRAVENOUS at 12:35

## 2019-03-05 RX ADMIN — INSULIN LISPRO 5 UNITS: 100 INJECTION, SOLUTION INTRAVENOUS; SUBCUTANEOUS at 21:20

## 2019-03-05 RX ADMIN — FENTANYL CITRATE 25 MCG: 50 INJECTION, SOLUTION INTRAMUSCULAR; INTRAVENOUS at 13:09

## 2019-03-05 ASSESSMENT — PULMONARY FUNCTION TESTS
PIF_VALUE: 14
PIF_VALUE: 4
PIF_VALUE: 13
PIF_VALUE: 23
PIF_VALUE: 1
PIF_VALUE: 12
PIF_VALUE: 3
PIF_VALUE: 1
PIF_VALUE: 14
PIF_VALUE: 24
PIF_VALUE: 13
PIF_VALUE: 4
PIF_VALUE: 19
PIF_VALUE: 3
PIF_VALUE: 14
PIF_VALUE: 14
PIF_VALUE: 23
PIF_VALUE: 3
PIF_VALUE: 29
PIF_VALUE: 13
PIF_VALUE: 13
PIF_VALUE: 3
PIF_VALUE: 10
PIF_VALUE: 3
PIF_VALUE: 13
PIF_VALUE: 14
PIF_VALUE: 13
PIF_VALUE: 12
PIF_VALUE: 13
PIF_VALUE: 9
PIF_VALUE: 1
PIF_VALUE: 13
PIF_VALUE: 3
PIF_VALUE: 10
PIF_VALUE: 2
PIF_VALUE: 14
PIF_VALUE: 7
PIF_VALUE: 1
PIF_VALUE: 4
PIF_VALUE: 14
PIF_VALUE: 2
PIF_VALUE: 7
PIF_VALUE: 4
PIF_VALUE: 12
PIF_VALUE: 13
PIF_VALUE: 14
PIF_VALUE: 15
PIF_VALUE: 3
PIF_VALUE: 14
PIF_VALUE: 13
PIF_VALUE: 25
PIF_VALUE: 3
PIF_VALUE: 14
PIF_VALUE: 13
PIF_VALUE: 14
PIF_VALUE: 3
PIF_VALUE: 1
PIF_VALUE: 1

## 2019-03-06 LAB
ABSOLUTE EOS #: 0.2 K/UL (ref 0–0.4)
ABSOLUTE IMMATURE GRANULOCYTE: ABNORMAL K/UL (ref 0–0.3)
ABSOLUTE LYMPH #: 2.1 K/UL (ref 1–4.8)
ABSOLUTE MONO #: 0.5 K/UL (ref 0.1–1.2)
ANION GAP SERPL CALCULATED.3IONS-SCNC: 9 MMOL/L (ref 9–17)
BASOPHILS # BLD: 1 % (ref 0–2)
BASOPHILS ABSOLUTE: 0 K/UL (ref 0–0.2)
BUN BLDV-MCNC: 13 MG/DL (ref 6–20)
BUN/CREAT BLD: 23 (ref 9–20)
CALCIUM SERPL-MCNC: 9 MG/DL (ref 8.6–10.4)
CHLORIDE BLD-SCNC: 100 MMOL/L (ref 98–107)
CO2: 32 MMOL/L (ref 20–31)
CREAT SERPL-MCNC: 0.56 MG/DL (ref 0.7–1.2)
DIFFERENTIAL TYPE: ABNORMAL
EOSINOPHILS RELATIVE PERCENT: 4 % (ref 1–8)
GFR AFRICAN AMERICAN: >60 ML/MIN
GFR NON-AFRICAN AMERICAN: >60 ML/MIN
GFR SERPL CREATININE-BSD FRML MDRD: ABNORMAL ML/MIN/{1.73_M2}
GFR SERPL CREATININE-BSD FRML MDRD: ABNORMAL ML/MIN/{1.73_M2}
GLUCOSE BLD-MCNC: 101 MG/DL (ref 70–99)
GLUCOSE BLD-MCNC: 136 MG/DL (ref 75–110)
GLUCOSE BLD-MCNC: 164 MG/DL (ref 75–110)
GLUCOSE BLD-MCNC: 190 MG/DL (ref 75–110)
HCT VFR BLD CALC: 39.5 % (ref 41–53)
HEMOGLOBIN: 12.8 G/DL (ref 13.5–17.5)
IMMATURE GRANULOCYTES: ABNORMAL %
LYMPHOCYTES # BLD: 32 % (ref 15–43)
MCH RBC QN AUTO: 28.5 PG (ref 26–34)
MCHC RBC AUTO-ENTMCNC: 32.5 G/DL (ref 31–37)
MCV RBC AUTO: 87.8 FL (ref 80–100)
MONOCYTES # BLD: 8 % (ref 6–14)
NRBC AUTOMATED: ABNORMAL PER 100 WBC
PDW BLD-RTO: 14.1 % (ref 11–14.5)
PLATELET # BLD: 285 K/UL (ref 140–450)
PLATELET ESTIMATE: ABNORMAL
PMV BLD AUTO: 8.4 FL (ref 6–12)
POTASSIUM SERPL-SCNC: 4.2 MMOL/L (ref 3.7–5.3)
RBC # BLD: 4.5 M/UL (ref 4.5–5.9)
RBC # BLD: ABNORMAL 10*6/UL
SEG NEUTROPHILS: 55 % (ref 44–74)
SEGMENTED NEUTROPHILS ABSOLUTE COUNT: 3.7 K/UL (ref 1.8–7.7)
SODIUM BLD-SCNC: 141 MMOL/L (ref 135–144)
VANCOMYCIN TROUGH DATE LAST DOSE: NORMAL
VANCOMYCIN TROUGH DOSE AMOUNT: NORMAL
VANCOMYCIN TROUGH TIME LAST DOSE: NORMAL
VANCOMYCIN TROUGH: 19.2 UG/ML (ref 10–20)
WBC # BLD: 6.6 K/UL (ref 3.5–11)
WBC # BLD: ABNORMAL 10*3/UL

## 2019-03-06 PROCEDURE — 80202 ASSAY OF VANCOMYCIN: CPT

## 2019-03-06 PROCEDURE — 99232 SBSQ HOSP IP/OBS MODERATE 35: CPT | Performed by: INTERNAL MEDICINE

## 2019-03-06 PROCEDURE — 6360000002 HC RX W HCPCS: Performed by: PODIATRIST

## 2019-03-06 PROCEDURE — 80048 BASIC METABOLIC PNL TOTAL CA: CPT

## 2019-03-06 PROCEDURE — 1200000000 HC SEMI PRIVATE

## 2019-03-06 PROCEDURE — 6370000000 HC RX 637 (ALT 250 FOR IP): Performed by: PODIATRIST

## 2019-03-06 PROCEDURE — 85025 COMPLETE CBC W/AUTO DIFF WBC: CPT

## 2019-03-06 PROCEDURE — 2580000003 HC RX 258: Performed by: PODIATRIST

## 2019-03-06 PROCEDURE — 97162 PT EVAL MOD COMPLEX 30 MIN: CPT | Performed by: PHYSICAL THERAPIST

## 2019-03-06 PROCEDURE — 36415 COLL VENOUS BLD VENIPUNCTURE: CPT

## 2019-03-06 PROCEDURE — 97530 THERAPEUTIC ACTIVITIES: CPT | Performed by: PHYSICAL THERAPIST

## 2019-03-06 PROCEDURE — 97165 OT EVAL LOW COMPLEX 30 MIN: CPT | Performed by: OCCUPATIONAL THERAPIST

## 2019-03-06 PROCEDURE — 99024 POSTOP FOLLOW-UP VISIT: CPT | Performed by: PODIATRIST

## 2019-03-06 PROCEDURE — 6370000000 HC RX 637 (ALT 250 FOR IP): Performed by: INTERNAL MEDICINE

## 2019-03-06 PROCEDURE — 2500000003 HC RX 250 WO HCPCS: Performed by: PODIATRIST

## 2019-03-06 PROCEDURE — 82947 ASSAY GLUCOSE BLOOD QUANT: CPT

## 2019-03-06 RX ORDER — CARBAMAZEPINE 100 MG/1
200 CAPSULE, EXTENDED RELEASE ORAL 2 TIMES DAILY
Status: DISCONTINUED | OUTPATIENT
Start: 2019-03-06 | End: 2019-03-07 | Stop reason: HOSPADM

## 2019-03-06 RX ADMIN — VANCOMYCIN HYDROCHLORIDE 1500 MG: 1 INJECTION, POWDER, LYOPHILIZED, FOR SOLUTION INTRAVENOUS at 16:28

## 2019-03-06 RX ADMIN — CLINDAMYCIN PHOSPHATE 600 MG: 600 INJECTION, SOLUTION INTRAVENOUS at 22:56

## 2019-03-06 RX ADMIN — ARIPIPRAZOLE 10 MG: 5 TABLET ORAL at 08:03

## 2019-03-06 RX ADMIN — VANCOMYCIN HYDROCHLORIDE 1500 MG: 1 INJECTION, POWDER, LYOPHILIZED, FOR SOLUTION INTRAVENOUS at 08:03

## 2019-03-06 RX ADMIN — INSULIN LISPRO 10 UNITS: 100 INJECTION, SOLUTION INTRAVENOUS; SUBCUTANEOUS at 12:32

## 2019-03-06 RX ADMIN — Medication 10 ML: at 20:11

## 2019-03-06 RX ADMIN — LEVOTHYROXINE SODIUM 50 MCG: 25 TABLET ORAL at 05:22

## 2019-03-06 RX ADMIN — INSULIN GLARGINE 48 UNITS: 100 INJECTION, SOLUTION SUBCUTANEOUS at 08:02

## 2019-03-06 RX ADMIN — CARBAMAZEPINE 200 MG: 100 CAPSULE, EXTENDED RELEASE ORAL at 09:06

## 2019-03-06 RX ADMIN — INSULIN LISPRO 3 UNITS: 100 INJECTION, SOLUTION INTRAVENOUS; SUBCUTANEOUS at 17:42

## 2019-03-06 RX ADMIN — INSULIN GLARGINE 48 UNITS: 100 INJECTION, SOLUTION SUBCUTANEOUS at 20:10

## 2019-03-06 RX ADMIN — ENOXAPARIN SODIUM 30 MG: 30 INJECTION SUBCUTANEOUS at 20:10

## 2019-03-06 RX ADMIN — CLINDAMYCIN PHOSPHATE 600 MG: 600 INJECTION, SOLUTION INTRAVENOUS at 14:48

## 2019-03-06 RX ADMIN — CLOTRIMAZOLE: 10 CREAM TOPICAL at 20:11

## 2019-03-06 RX ADMIN — ROSUVASTATIN CALCIUM 40 MG: 5 TABLET, FILM COATED ORAL at 17:40

## 2019-03-06 RX ADMIN — ENOXAPARIN SODIUM 30 MG: 30 INJECTION SUBCUTANEOUS at 08:01

## 2019-03-06 RX ADMIN — INSULIN LISPRO 10 UNITS: 100 INJECTION, SOLUTION INTRAVENOUS; SUBCUTANEOUS at 08:02

## 2019-03-06 RX ADMIN — Medication 10 ML: at 14:56

## 2019-03-06 RX ADMIN — CLOTRIMAZOLE: 10 CREAM TOPICAL at 08:03

## 2019-03-06 RX ADMIN — Medication 10 ML: at 08:03

## 2019-03-06 RX ADMIN — INSULIN LISPRO 2 UNITS: 100 INJECTION, SOLUTION INTRAVENOUS; SUBCUTANEOUS at 20:10

## 2019-03-06 RX ADMIN — CLINDAMYCIN PHOSPHATE 600 MG: 600 INJECTION, SOLUTION INTRAVENOUS at 06:47

## 2019-03-06 RX ADMIN — INSULIN LISPRO 10 UNITS: 100 INJECTION, SOLUTION INTRAVENOUS; SUBCUTANEOUS at 17:41

## 2019-03-06 ASSESSMENT — PAIN SCALES - GENERAL
PAINLEVEL_OUTOF10: 0

## 2019-03-07 VITALS
RESPIRATION RATE: 20 BRPM | BODY MASS INDEX: 42.66 KG/M2 | WEIGHT: 315 LBS | TEMPERATURE: 97.9 F | DIASTOLIC BLOOD PRESSURE: 79 MMHG | OXYGEN SATURATION: 100 % | SYSTOLIC BLOOD PRESSURE: 123 MMHG | HEIGHT: 72 IN | HEART RATE: 74 BPM

## 2019-03-07 LAB
ABSOLUTE EOS #: 0.3 K/UL (ref 0–0.4)
ABSOLUTE IMMATURE GRANULOCYTE: ABNORMAL K/UL (ref 0–0.3)
ABSOLUTE LYMPH #: 1.7 K/UL (ref 1–4.8)
ABSOLUTE MONO #: 0.5 K/UL (ref 0.1–1.2)
ANION GAP SERPL CALCULATED.3IONS-SCNC: 12 MMOL/L (ref 9–17)
BASOPHILS # BLD: 1 % (ref 0–2)
BASOPHILS ABSOLUTE: 0.1 K/UL (ref 0–0.2)
BUN BLDV-MCNC: 13 MG/DL (ref 6–20)
BUN/CREAT BLD: 24 (ref 9–20)
CALCIUM SERPL-MCNC: 9.4 MG/DL (ref 8.6–10.4)
CHLORIDE BLD-SCNC: 100 MMOL/L (ref 98–107)
CO2: 28 MMOL/L (ref 20–31)
CREAT SERPL-MCNC: 0.54 MG/DL (ref 0.7–1.2)
DIFFERENTIAL TYPE: ABNORMAL
EOSINOPHILS RELATIVE PERCENT: 4 % (ref 1–8)
GFR AFRICAN AMERICAN: >60 ML/MIN
GFR NON-AFRICAN AMERICAN: >60 ML/MIN
GFR SERPL CREATININE-BSD FRML MDRD: ABNORMAL ML/MIN/{1.73_M2}
GFR SERPL CREATININE-BSD FRML MDRD: ABNORMAL ML/MIN/{1.73_M2}
GLUCOSE BLD-MCNC: 117 MG/DL (ref 70–99)
GLUCOSE BLD-MCNC: 129 MG/DL (ref 75–110)
HCT VFR BLD CALC: 40.4 % (ref 41–53)
HEMOGLOBIN: 13.2 G/DL (ref 13.5–17.5)
IMMATURE GRANULOCYTES: ABNORMAL %
LYMPHOCYTES # BLD: 25 % (ref 15–43)
MCH RBC QN AUTO: 29 PG (ref 26–34)
MCHC RBC AUTO-ENTMCNC: 32.6 G/DL (ref 31–37)
MCV RBC AUTO: 88.7 FL (ref 80–100)
MONOCYTES # BLD: 8 % (ref 6–14)
NRBC AUTOMATED: ABNORMAL PER 100 WBC
PDW BLD-RTO: 13.7 % (ref 11–14.5)
PLATELET # BLD: 230 K/UL (ref 140–450)
PLATELET ESTIMATE: ABNORMAL
PMV BLD AUTO: 8.3 FL (ref 6–12)
POTASSIUM SERPL-SCNC: 4.3 MMOL/L (ref 3.7–5.3)
RBC # BLD: 4.56 M/UL (ref 4.5–5.9)
RBC # BLD: ABNORMAL 10*6/UL
SEG NEUTROPHILS: 62 % (ref 44–74)
SEGMENTED NEUTROPHILS ABSOLUTE COUNT: 4.2 K/UL (ref 1.8–7.7)
SODIUM BLD-SCNC: 140 MMOL/L (ref 135–144)
SURGICAL PATHOLOGY REPORT: NORMAL
VANCOMYCIN TROUGH DATE LAST DOSE: ABNORMAL
VANCOMYCIN TROUGH DOSE AMOUNT: ABNORMAL
VANCOMYCIN TROUGH TIME LAST DOSE: ABNORMAL
VANCOMYCIN TROUGH: 28.8 UG/ML (ref 10–20)
WBC # BLD: 6.7 K/UL (ref 3.5–11)
WBC # BLD: ABNORMAL 10*3/UL

## 2019-03-07 PROCEDURE — 80048 BASIC METABOLIC PNL TOTAL CA: CPT

## 2019-03-07 PROCEDURE — 99024 POSTOP FOLLOW-UP VISIT: CPT | Performed by: PODIATRIST

## 2019-03-07 PROCEDURE — 2580000003 HC RX 258: Performed by: PODIATRIST

## 2019-03-07 PROCEDURE — 6360000002 HC RX W HCPCS: Performed by: PODIATRIST

## 2019-03-07 PROCEDURE — 80202 ASSAY OF VANCOMYCIN: CPT

## 2019-03-07 PROCEDURE — 85025 COMPLETE CBC W/AUTO DIFF WBC: CPT

## 2019-03-07 PROCEDURE — 6370000000 HC RX 637 (ALT 250 FOR IP): Performed by: PODIATRIST

## 2019-03-07 PROCEDURE — 36415 COLL VENOUS BLD VENIPUNCTURE: CPT

## 2019-03-07 PROCEDURE — 97110 THERAPEUTIC EXERCISES: CPT

## 2019-03-07 PROCEDURE — 2500000003 HC RX 250 WO HCPCS: Performed by: PODIATRIST

## 2019-03-07 PROCEDURE — 82947 ASSAY GLUCOSE BLOOD QUANT: CPT

## 2019-03-07 PROCEDURE — 99238 HOSP IP/OBS DSCHRG MGMT 30/<: CPT | Performed by: FAMILY MEDICINE

## 2019-03-07 PROCEDURE — 6370000000 HC RX 637 (ALT 250 FOR IP): Performed by: INTERNAL MEDICINE

## 2019-03-07 PROCEDURE — 97116 GAIT TRAINING THERAPY: CPT

## 2019-03-07 RX ORDER — ARIPIPRAZOLE 10 MG/1
10 TABLET ORAL DAILY
Qty: 30 TABLET | Refills: 0 | Status: SHIPPED | OUTPATIENT
Start: 2019-03-08

## 2019-03-07 RX ORDER — CLINDAMYCIN HYDROCHLORIDE 300 MG/1
300 CAPSULE ORAL 4 TIMES DAILY
Qty: 40 CAPSULE | Refills: 0 | OUTPATIENT
Start: 2019-03-07 | End: 2019-03-17

## 2019-03-07 RX ORDER — GREEN TEA/HOODIA GORDONII 315-12.5MG
1 CAPSULE ORAL 3 TIMES DAILY
Qty: 30 TABLET | Refills: 0 | COMMUNITY
Start: 2019-03-07 | End: 2019-03-17

## 2019-03-07 RX ORDER — ARIPIPRAZOLE 10 MG/1
10 TABLET ORAL DAILY
Qty: 30 TABLET | Refills: 0 | OUTPATIENT
Start: 2019-03-08

## 2019-03-07 RX ORDER — SACCHAROMYCES BOULARDII 250 MG
250 CAPSULE ORAL 2 TIMES DAILY
Qty: 20 CAPSULE | Refills: 0 | COMMUNITY
Start: 2019-03-07 | End: 2019-03-17

## 2019-03-07 RX ORDER — GREEN TEA/HOODIA GORDONII 315-12.5MG
1 CAPSULE ORAL DAILY
Qty: 30 TABLET | Refills: 0 | COMMUNITY
Start: 2019-03-07 | End: 2019-04-06

## 2019-03-07 RX ORDER — CLINDAMYCIN HYDROCHLORIDE 300 MG/1
300 CAPSULE ORAL 4 TIMES DAILY
Qty: 40 CAPSULE | Refills: 0 | Status: SHIPPED | OUTPATIENT
Start: 2019-03-07 | End: 2019-03-17

## 2019-03-07 RX ADMIN — INSULIN GLARGINE 48 UNITS: 100 INJECTION, SOLUTION SUBCUTANEOUS at 09:22

## 2019-03-07 RX ADMIN — VANCOMYCIN HYDROCHLORIDE 1500 MG: 1 INJECTION, POWDER, LYOPHILIZED, FOR SOLUTION INTRAVENOUS at 00:40

## 2019-03-07 RX ADMIN — CARBAMAZEPINE 200 MG: 100 CAPSULE, EXTENDED RELEASE ORAL at 09:51

## 2019-03-07 RX ADMIN — ARIPIPRAZOLE 10 MG: 5 TABLET ORAL at 09:22

## 2019-03-07 RX ADMIN — ENOXAPARIN SODIUM 30 MG: 30 INJECTION SUBCUTANEOUS at 09:23

## 2019-03-07 RX ADMIN — CLINDAMYCIN PHOSPHATE 600 MG: 600 INJECTION, SOLUTION INTRAVENOUS at 06:45

## 2019-03-07 RX ADMIN — INSULIN LISPRO 10 UNITS: 100 INJECTION, SOLUTION INTRAVENOUS; SUBCUTANEOUS at 12:24

## 2019-03-07 RX ADMIN — Medication 10 ML: at 09:22

## 2019-03-07 RX ADMIN — LEVOTHYROXINE SODIUM 50 MCG: 25 TABLET ORAL at 05:15

## 2019-03-07 RX ADMIN — INSULIN LISPRO 10 UNITS: 100 INJECTION, SOLUTION INTRAVENOUS; SUBCUTANEOUS at 09:23

## 2019-03-07 RX ADMIN — CLOTRIMAZOLE: 10 CREAM TOPICAL at 09:22

## 2019-03-07 ASSESSMENT — PAIN SCALES - GENERAL
PAINLEVEL_OUTOF10: 0
PAINLEVEL_OUTOF10: 0

## 2019-03-10 LAB
CULTURE: ABNORMAL
CULTURE: ABNORMAL
DIRECT EXAM: ABNORMAL
DIRECT EXAM: ABNORMAL
Lab: ABNORMAL
SPECIMEN DESCRIPTION: ABNORMAL

## 2019-03-11 ENCOUNTER — OFFICE VISIT (OUTPATIENT)
Dept: WOUND CARE | Age: 34
End: 2019-03-11
Payer: MEDICARE

## 2019-03-11 VITALS
WEIGHT: 315 LBS | HEART RATE: 84 BPM | TEMPERATURE: 98.3 F | DIASTOLIC BLOOD PRESSURE: 68 MMHG | SYSTOLIC BLOOD PRESSURE: 114 MMHG | BODY MASS INDEX: 41.75 KG/M2 | HEIGHT: 73 IN

## 2019-03-11 DIAGNOSIS — L97.414 SKIN ULCER OF RIGHT HEEL WITH NECROSIS OF BONE (HCC): Primary | ICD-10-CM

## 2019-03-11 DIAGNOSIS — E11.49 DM (DIABETES MELLITUS), TYPE 2 WITH NEUROLOGICAL COMPLICATIONS (HCC): ICD-10-CM

## 2019-03-11 PROCEDURE — 97597 DBRDMT OPN WND 1ST 20 CM/<: CPT | Performed by: PODIATRIST

## 2019-03-11 PROCEDURE — 99999 PR OFFICE/OUTPT VISIT,PROCEDURE ONLY: CPT | Performed by: PODIATRIST

## 2019-03-18 ENCOUNTER — OFFICE VISIT (OUTPATIENT)
Dept: WOUND CARE | Age: 34
End: 2019-03-18
Payer: MEDICARE

## 2019-03-18 ENCOUNTER — TELEPHONE (OUTPATIENT)
Dept: WOUND CARE | Age: 34
End: 2019-03-18

## 2019-03-18 VITALS
BODY MASS INDEX: 44.07 KG/M2 | HEART RATE: 74 BPM | DIASTOLIC BLOOD PRESSURE: 78 MMHG | WEIGHT: 315 LBS | TEMPERATURE: 98.1 F | SYSTOLIC BLOOD PRESSURE: 128 MMHG

## 2019-03-18 DIAGNOSIS — L97.414 SKIN ULCER OF RIGHT HEEL WITH NECROSIS OF BONE (HCC): Primary | ICD-10-CM

## 2019-03-18 DIAGNOSIS — E11.49 DM (DIABETES MELLITUS), TYPE 2 WITH NEUROLOGICAL COMPLICATIONS (HCC): ICD-10-CM

## 2019-03-18 PROCEDURE — 99024 POSTOP FOLLOW-UP VISIT: CPT | Performed by: PODIATRIST

## 2019-03-18 PROCEDURE — 11042 DBRDMT SUBQ TIS 1ST 20SQCM/<: CPT | Performed by: PODIATRIST

## 2019-03-20 ENCOUNTER — TELEPHONE (OUTPATIENT)
Dept: WOUND CARE | Age: 34
End: 2019-03-20

## 2019-03-25 ENCOUNTER — OFFICE VISIT (OUTPATIENT)
Dept: WOUND CARE | Age: 34
End: 2019-03-25
Payer: MEDICARE

## 2019-03-25 ENCOUNTER — TELEPHONE (OUTPATIENT)
Dept: WOUND CARE | Age: 34
End: 2019-03-25

## 2019-03-25 VITALS — TEMPERATURE: 98.1 F | HEART RATE: 82 BPM | DIASTOLIC BLOOD PRESSURE: 78 MMHG | SYSTOLIC BLOOD PRESSURE: 130 MMHG

## 2019-03-25 DIAGNOSIS — M86.9 OSTEOMYELITIS OF RIGHT FOOT, UNSPECIFIED TYPE (HCC): ICD-10-CM

## 2019-03-25 DIAGNOSIS — E11.49 DM (DIABETES MELLITUS), TYPE 2 WITH NEUROLOGICAL COMPLICATIONS (HCC): ICD-10-CM

## 2019-03-25 DIAGNOSIS — L97.414 SKIN ULCER OF RIGHT HEEL WITH NECROSIS OF BONE (HCC): Primary | ICD-10-CM

## 2019-03-25 PROCEDURE — 99999 PR OFFICE/OUTPT VISIT,PROCEDURE ONLY: CPT | Performed by: PODIATRIST

## 2019-03-25 PROCEDURE — 11042 DBRDMT SUBQ TIS 1ST 20SQCM/<: CPT | Performed by: PODIATRIST

## 2019-03-25 RX ORDER — DOXYCYCLINE HYCLATE 100 MG/1
100 CAPSULE ORAL 2 TIMES DAILY
Qty: 20 CAPSULE | Refills: 0 | Status: SHIPPED | OUTPATIENT
Start: 2019-03-25 | End: 2019-04-04

## 2019-04-01 ENCOUNTER — OFFICE VISIT (OUTPATIENT)
Dept: WOUND CARE | Age: 34
End: 2019-04-01
Payer: MEDICARE

## 2019-04-01 VITALS
TEMPERATURE: 96.4 F | SYSTOLIC BLOOD PRESSURE: 118 MMHG | DIASTOLIC BLOOD PRESSURE: 60 MMHG | RESPIRATION RATE: 18 BRPM | HEART RATE: 64 BPM

## 2019-04-01 DIAGNOSIS — L97.414 SKIN ULCER OF RIGHT HEEL WITH NECROSIS OF BONE (HCC): Primary | ICD-10-CM

## 2019-04-01 DIAGNOSIS — E11.49 DM (DIABETES MELLITUS), TYPE 2 WITH NEUROLOGICAL COMPLICATIONS (HCC): ICD-10-CM

## 2019-04-01 DIAGNOSIS — M86.9 OSTEOMYELITIS OF RIGHT FOOT, UNSPECIFIED TYPE (HCC): ICD-10-CM

## 2019-04-01 PROCEDURE — 99999 PR OFFICE/OUTPT VISIT,PROCEDURE ONLY: CPT | Performed by: PODIATRIST

## 2019-04-01 PROCEDURE — 11042 DBRDMT SUBQ TIS 1ST 20SQCM/<: CPT | Performed by: PODIATRIST

## 2019-04-01 NOTE — PATIENT INSTRUCTIONS
Discontinue wound VAC. Resume iodoform ribbon packing to right heel wound, change daily, and as needed for soiling or dislodged. Cleanse wound with normal saline with each dressing change. Follow up in 1 week with Dr. Misti Arellano. SIGNS OF INFECTION  - Redness, swelling, skin hot  - Wound bed turns black or stringy yellow  - Foul odor  - Increased drainage or pus  - Increased pain  - Fever greater than 100F    CALL YOUR DOCTOR OR SEEK MEDICAL ATTENTION IF SIGNS OF INFECTION. DO NOT WAIT UNTIL YOUR NEXT APPOINTMENT    Call the Wound Care Nurse with any other questions or concerns- [phone].

## 2019-04-01 NOTE — PROGRESS NOTES
nightly   Yes Historical Provider, MD   metFORMIN (GLUCOPHAGE) 500 MG tablet Take 1,000 mg by mouth 2 times daily (with meals)  1/21/18  Yes Historical Provider, MD       Social History     Tobacco Use    Smoking status: Never Smoker    Smokeless tobacco: Never Used   Substance Use Topics    Alcohol use: No     Alcohol/week: 0.0 oz       Review of Systems: All 12 systems reviewed and pertinent positives noted above. Lower Extremity Physical Examination:     Vitals:   Vitals:    04/01/19 0841   BP: 118/60   Pulse: 64   Resp: 18   Temp: 96.4 °F (35.8 °C)     General: AAO x 3 in NAD. Vascular: DP and PT pulses palpable 2/4, bilateral.  CFT <3 seconds, bilateral.  Hair growth present to the level of the digits, bilateral.  Edema present, bilateral.  Varicosities present, bilateral. Erythema absent, bilateral. Distal Rubor absent bilateral.  Temperature within normal limits bilateral. Hyperpigmentation present bilateral. Atrophic skin yes. Neurological: Sensation Impaired to light touch to level of digits, bilateral.  Protective sensation intact  10/10 sites via 5.07/10g Gordon-Tyrese Monofilament, bilateral.  negative Tinel's, bilateral.  negative Valleix sign, bilateral.  Vibratory abnormal  bilateral.  Reflexes Decreased bilateral.  Paresthesias positive. Dysthesias negative. Sharp/dull intact bilateral.  Musculoskeletal: Muscle strength 5/5, bilateral.  Pain absent upon palpation bilateral. Normal medial longitudinal arch, bilateral.  Ankle ROM within normal limits,bilateral.  1st MPJ ROM within normal limits, bilateral.  Dorsally contracted digits absent. No other foot deformities. Integument:   Open lesion present, Right. Ulcer plantar R heel, mild fibrin,  red granular base on edges , no malodor present, no maceration on edges, positive probing, no undermining. No surrounding signs of infx.   Interdigital maceration absent to web spaces , Bilateral.  Nails thickened, dystrophic and crumbly, discolored with subungual debris. Fissures absent, Bilateral. Hyperkeratotic tissue is absent    Asessment: Patient is a 28 y.o. male with:    Diagnosis Orders   1. 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 Osteomyelitis of right foot, unspecified type ([facility] [address]     3. DM (diabetes mellitus), type 2 with neurological complications ([facility]         Ulcer Classification:  R heel 3C  IDSA  no infection. Plan:  Sharp excisional debridement took place of the ulceration, sub-cutaneous in nature,  curette and tissue nippers were used for debridement. All non viable and necrotic tissue was removed to promote healing. See wound assessment note for post debridement measurements. [employer] wound VAC stopped. Seems to be causing more of an irritation to surrounding skin. Dressing: iodoform gauze packing  Patient will use compression stockings on a regular basis L leg. Any increase in swelling or redness or signs of ulceration will be seen back for further compression wrap therapy. Also advised importance of continued elevation of the leg.  ACE wrap only R leg  Continue offloading as advised  Follow up in 1 week

## 2019-04-01 NOTE — PROGRESS NOTES
Pt presents with VAC dressing completely dislodged. Pt & mother were unaware of this.    Report that machine started alarming early this morning; at least since 6am.

## 2019-04-03 ENCOUNTER — TELEPHONE (OUTPATIENT)
Dept: WOUND CARE | Age: 34
End: 2019-04-03

## 2019-04-08 ENCOUNTER — TELEPHONE (OUTPATIENT)
Dept: WOUND CARE | Age: 34
End: 2019-04-08

## 2019-04-08 NOTE — TELEPHONE ENCOUNTER
Faxed Dr. Baldomero Holguin Progress Note of 4/1/2019 to [facility]. The [address] [place] [address].

## 2019-04-09 ENCOUNTER — HOSPITAL ENCOUNTER (OUTPATIENT)
Age: 34
Setting detail: SPECIMEN
Discharge: HOME | End: 2019-04-09
Payer: MEDICARE

## 2019-04-09 ENCOUNTER — OFFICE VISIT (OUTPATIENT)
Dept: PODIATRY | Age: 34
End: 2019-04-09
Payer: MEDICARE

## 2019-04-09 ENCOUNTER — TELEPHONE (OUTPATIENT)
Dept: WOUND CARE | Age: 34
End: 2019-04-09

## 2019-04-09 VITALS
DIASTOLIC BLOOD PRESSURE: 70 MMHG | RESPIRATION RATE: 20 BRPM | TEMPERATURE: 98.3 F | SYSTOLIC BLOOD PRESSURE: 126 MMHG | HEART RATE: 72 BPM

## 2019-04-09 DIAGNOSIS — E11.51 CONTROLLED TYPE 2 DM WITH PERIPHERAL CIRCULATORY DISORDER (HCC): ICD-10-CM

## 2019-04-09 DIAGNOSIS — L97.414 SKIN ULCER OF RIGHT HEEL WITH NECROSIS OF BONE (HCC): ICD-10-CM

## 2019-04-09 DIAGNOSIS — B35.1 DERMATOPHYTOSIS OF NAIL: ICD-10-CM

## 2019-04-09 DIAGNOSIS — L97.921 SKIN ULCER OF LEFT LOWER LEG, LIMITED TO BREAKDOWN OF SKIN (HCC): ICD-10-CM

## 2019-04-09 DIAGNOSIS — L97.414 SKIN ULCER OF RIGHT HEEL WITH NECROSIS OF BONE (HCC): Primary | ICD-10-CM

## 2019-04-09 PROCEDURE — 87070 CULTURE OTHR SPECIMN AEROBIC: CPT

## 2019-04-09 PROCEDURE — 1036F TOBACCO NON-USER: CPT | Performed by: PODIATRIST

## 2019-04-09 PROCEDURE — 87205 SMEAR GRAM STAIN: CPT

## 2019-04-09 PROCEDURE — 87186 SC STD MICRODIL/AGAR DIL: CPT

## 2019-04-09 PROCEDURE — 99213 OFFICE O/P EST LOW 20 MIN: CPT | Performed by: PODIATRIST

## 2019-04-09 PROCEDURE — 2022F DILAT RTA XM EVC RTNOPTHY: CPT | Performed by: PODIATRIST

## 2019-04-09 PROCEDURE — G8417 CALC BMI ABV UP PARAM F/U: HCPCS | Performed by: PODIATRIST

## 2019-04-09 PROCEDURE — 11721 DEBRIDE NAIL 6 OR MORE: CPT | Performed by: PODIATRIST

## 2019-04-09 PROCEDURE — 87075 CULTR BACTERIA EXCEPT BLOOD: CPT

## 2019-04-09 PROCEDURE — 87176 TISSUE HOMOGENIZATION CULTR: CPT

## 2019-04-09 PROCEDURE — 3044F HG A1C LEVEL LT 7.0%: CPT | Performed by: PODIATRIST

## 2019-04-09 PROCEDURE — 11042 DBRDMT SUBQ TIS 1ST 20SQCM/<: CPT | Performed by: PODIATRIST

## 2019-04-09 PROCEDURE — G8427 DOCREV CUR MEDS BY ELIG CLIN: HCPCS | Performed by: PODIATRIST

## 2019-04-09 PROCEDURE — 87077 CULTURE AEROBIC IDENTIFY: CPT

## 2019-04-09 NOTE — PATIENT INSTRUCTIONS
Use iodoform guaze packing to right heel with dry dressing change daily,. Use foam dressing open areas on bilat lower legs change daily due to large amount of drainage. Continue to wear off loading surgical shoe at all times. Return to see  on Monday.

## 2019-04-09 NOTE — PROGRESS NOTES
Subjective:    Rigoberto Martinez is a 28 y.o. male who presents with the new ulcer L leg.  home health has put dressing on it. Noticed during the past week. No other tx started. No trauma. Pt still has ulcer R foot. Pt has no pain. Pt has wore offloading shoe. Patient relates pain is Absent . Pain is rated 0 out of 10 and is described as none. Currently denies F/C/N/V. No Known Allergies    Past Medical History:   Diagnosis Date    Diabetes mellitus ([facility] [address]     Hyperlipidemia     Prader-Willi syndrome     Sleep apnea        Prior to Admission medications    Medication Sig Start Date End Date Taking?  Authorizing Provider   ARIPiprazole (ABILIFY) 10 MG tablet Take 1 tablet by mouth daily 3/8/19  Yes Huyen Reyna MD   loperamide (IMODIUM) 2 MG capsule Take 2 mg by mouth 3 times daily as needed for Diarrhea   Yes Historical Provider, MD   acetaminophen (TYLENOL) 500 MG tablet Take 500 mg by mouth every 6 hours as needed for Pain or Fever (q 4-6 hours prn)   Yes Historical Provider, MD   insulin detemir (LEVEMIR FLEXTOUCH) 100 UNIT/ML injection pen Inject 48 Units into the skin 2 times daily  4/10/18  Yes Historical Provider, MD   insulin aspart (NOVOLOG) 100 UNIT/ML injection vial Inject into the skin 3 times daily (before meals)   Yes Historical Provider, MD   carBAMazepine (TEGRETOL XR) 200 MG extended release tablet Take 200 mg by mouth 2 times daily 3/1/18  Yes Historical Provider, MD   levothyroxine (SYNTHROID) 50 MCG tablet Take 50 mcg by mouth Daily  1/21/18  Yes Historical Provider, MD   pioglitazone (ACTOS) 15 MG tablet Take 30 mg by mouth daily  1/21/18  Yes Historical Provider, MD   rosuvastatin (CRESTOR) 40 MG tablet Take 40 mg by mouth every evening 1/21/18  Yes Historical Provider, MD   albuterol sulfate  (90 BASE) MCG/ACT inhaler Inhale 2 puffs into the lungs every 6 hours as needed for Wheezing   Yes Historical Provider, MD   CPAP Machine MISC by Does not apply route nightly surrounding signs of infx. Interdigital maceration absent to web spaces , Bilateral.  Nails thickened, dystrophic and crumbly, discolored with subungual debris. Fissures absent, Bilateral. Hyperkeratotic tissue is absent  Wound 07/16/18 #1: right heel, plantar surface (Active)   Wound Diabetic 4/1/2019  8:42 AM   Dressing/Treatment Vacuum dressing 3/25/2019  8:34 AM   Wound Cleansed Rinsed/Irrigated with saline 4/1/2019  8:42 AM   Dressing Change Due 02/19/19 2/13/2019  2:08 PM   Wound Length (cm) 6 cm 4/9/2019  9:18 AM   Wound Width (cm) 5.5 cm 4/9/2019  9:18 AM   Wound Depth (cm) 2.4 cm 4/9/2019  9:18 AM   Wound Surface Area (cm^2) 33 cm^2 4/9/2019  9:18 AM   Change in Wound Size % (l*w) -921.67 4/9/2019  9:18 AM   Wound Volume (cm^3) 79.2 cm^3 4/9/2019  9:18 AM   Wound Healing % -2970 4/9/2019  9:18 AM   Post-Procedure Length (cm) 1.7 cm 1/28/2019 10:29 AM   Post-Procedure Width (cm) 1.9 cm 1/28/2019 10:29 AM   Post-Procedure Depth (cm) 0.8 cm 1/28/2019 10:29 AM   Post-Procedure Surface Area (cm^2) 3.23 cm^2 1/28/2019 10:29 AM   Post-Procedure Volume (cm^3) 2.58 cm^3 1/28/2019 10:29 AM   Wound Assessment Red 2/25/2019  8:57 AM   Drainage Amount Large 4/9/2019  9:05 AM   Drainage Description Serosanguinous 4/9/2019  9:05 AM   Odor None 4/9/2019  9:05 AM   Margins Other (Comment) 4/1/2019  8:42 AM   Exposed structure Bone 4/1/2019  8:42 AM   Galilea-wound Assessment Maceration 4/1/2019  8:42 AM   Non-staged Wound Description Full thickness 4/1/2019  8:42 AM   Red%Wound Bed 100 4/9/2019  9:05 AM   Number of days: 56       Asessment: Patient is a 28 y.o. male with:    Diagnosis Orders   1. 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)   DIABETES FOOT EXAM    Tissue Culture    NH DEBRIDEMENT, SKIN, SUB-Q TISSUE,=<20 SQ CM   2. Skin ulcer of left lower leg, limited to breakdown of skin ([facility])   DIABETES FOOT EXAM   3.  Controlled type 2 DM with peripheral circulatory disorder ([facility])   DIABETES FOOT EXAM    NH DEBRIDEMENT, SKIN, SUB-Q TISSUE,=<20 SQ CM    NJ DEBRIDEMENT OF NAILS, 6 OR MORE   4. Dermatophytosis of nail  HM DIABETES FOOT EXAM    NJ DEBRIDEMENT OF NAILS, 6 OR MORE       Ulcer Classification:  R heel 3C R and 1C L leg  IDSA  no infection. Plan:  Sharp excisional debridement took place of the ulceration, sub-cutaneous in nature,  curette and tissue nippers were used for debridement. All non viable and necrotic tissue was removed to promote healing. See wound assessment note for post debridement measurements. No debridement needed of new wound L leg. Dressing: iodoform gauze packing R, foam L leg  Patient will use compression stockings on a regular basis L leg. Any increase in swelling or redness or signs of ulceration will be seen back for further compression wrap therapy. Also advised importance of continued elevation of the leg. ACE wrap only R leg  Orders Placed This Encounter   Procedures    Tissue Culture     DIABETES FOOT EXAM    NJ DEBRIDEMENT, SKIN, SUB-Q TISSUE,=<20 SQ CM    NJ DEBRIDEMENT OF NAILS, 6 OR MORE   All nails as mentioned above debrided in length and thickness. Patient advised OTC methods for treatment of the mycotic nails. Patient will follow up in 10 weeks. Further recs post culture.   Potential of integra applicaitjon near future  Continue offloading as advised  Follow up in 1 week

## 2019-04-09 NOTE — PROGRESS NOTES
Foot Care Worksheet  PCP: Grady Pickens MD  Last visit: 03/14/2019    Nail description:  Thick , Yellow , Crumbly , Marked limitation of ambulation     Pain resulting from thickened and dystrophy of nail plate No    Nails involved  Right   1, 2  (T5-T9)  Left     1, 2, 3, 4, 5  (TA-T4)    Q7 1 Class A Finding - Non traumatic amputation of foot No    Q8 2 Class B Findings - Absent DP pulse No, Absent PT pulse No, Advanced tropic changes (3 required) Yes    Decrease hair growth Yes, Nail changes/thickening Yes, Pigmented changes/discoloration Yes, Skin texture (thin, shiny) Yes, Skin color (rubor/redness) Yes    Q9 1 Class B and 2 Class C Findings  Claudication No, Temperature change Yes, Paresthesia Yes, Burning No, Edema Yes

## 2019-04-11 ENCOUNTER — TELEPHONE (OUTPATIENT)
Dept: PODIATRY | Age: 34
End: 2019-04-11

## 2019-04-15 ENCOUNTER — OFFICE VISIT (OUTPATIENT)
Dept: WOUND CARE | Age: 34
End: 2019-04-15
Payer: MEDICARE

## 2019-04-15 VITALS
BODY MASS INDEX: 44.25 KG/M2 | WEIGHT: 315 LBS | SYSTOLIC BLOOD PRESSURE: 128 MMHG | TEMPERATURE: 97.1 F | DIASTOLIC BLOOD PRESSURE: 74 MMHG | HEART RATE: 82 BPM

## 2019-04-15 DIAGNOSIS — L97.414 SKIN ULCER OF RIGHT HEEL WITH NECROSIS OF BONE (HCC): Primary | ICD-10-CM

## 2019-04-15 DIAGNOSIS — E11.49 DM (DIABETES MELLITUS), TYPE 2 WITH NEUROLOGICAL COMPLICATIONS (HCC): ICD-10-CM

## 2019-04-15 PROCEDURE — 99213 OFFICE O/P EST LOW 20 MIN: CPT | Performed by: PODIATRIST

## 2019-04-15 PROCEDURE — 2022F DILAT RTA XM EVC RTNOPTHY: CPT | Performed by: PODIATRIST

## 2019-04-15 PROCEDURE — G8417 CALC BMI ABV UP PARAM F/U: HCPCS | Performed by: PODIATRIST

## 2019-04-15 PROCEDURE — G8427 DOCREV CUR MEDS BY ELIG CLIN: HCPCS | Performed by: PODIATRIST

## 2019-04-15 PROCEDURE — 1036F TOBACCO NON-USER: CPT | Performed by: PODIATRIST

## 2019-04-15 PROCEDURE — 11042 DBRDMT SUBQ TIS 1ST 20SQCM/<: CPT | Performed by: PODIATRIST

## 2019-04-15 PROCEDURE — 3044F HG A1C LEVEL LT 7.0%: CPT | Performed by: PODIATRIST

## 2019-04-15 RX ORDER — CIPROFLOXACIN 500 MG/1
500 TABLET, FILM COATED ORAL 2 TIMES DAILY
Qty: 20 TABLET | Refills: 0 | Status: SHIPPED | OUTPATIENT
Start: 2019-04-15 | End: 2019-04-25

## 2019-04-15 NOTE — PROGRESS NOTES
Subjective:    Doni Robb is a 28 y.o. male who presents for re evaluation of the persisting and very slow to heal R heel ulcer. No changes seen at home. . Pt has no pain. Pt has wore offloading shoe. Patient relates pain is Absent . Pain is rated 0 out of 10 and is described as none. Currently denies F/C/N/V. No Known Allergies    Past Medical History:   Diagnosis Date    Diabetes mellitus ([facility] [address]     Hyperlipidemia     Prader-Willi syndrome     Sleep apnea        Prior to Admission medications    Medication Sig Start Date End Date Taking?  Authorizing Provider   ciprofloxacin (CIPRO) 500 MG tablet Take 1 tablet by mouth 2 times daily for 10 days 4/15/19 4/25/19 Yes Bert Mike DPM   ARIPiprazole (ABILIFY) 10 MG tablet Take 1 tablet by mouth daily 3/8/19  Yes Carolyne Lindsay MD   loperamide (IMODIUM) 2 MG capsule Take 2 mg by mouth 3 times daily as needed for Diarrhea   Yes Historical Provider, MD   acetaminophen (TYLENOL) 500 MG tablet Take 500 mg by mouth every 6 hours as needed for Pain or Fever (q 4-6 hours prn)   Yes Historical Provider, MD   insulin detemir (LEVEMIR FLEXTOUCH) 100 UNIT/ML injection pen Inject 48 Units into the skin 2 times daily  4/10/18  Yes Historical Provider, MD   insulin aspart (NOVOLOG) 100 UNIT/ML injection vial Inject into the skin 3 times daily (before meals)   Yes Historical Provider, MD   carBAMazepine (TEGRETOL XR) 200 MG extended release tablet Take 200 mg by mouth 2 times daily 3/1/18  Yes Historical Provider, MD   levothyroxine (SYNTHROID) 50 MCG tablet Take 50 mcg by mouth Daily  1/21/18  Yes Historical Provider, MD   pioglitazone (ACTOS) 15 MG tablet Take 30 mg by mouth daily  1/21/18  Yes Historical Provider, MD   rosuvastatin (CRESTOR) 40 MG tablet Take 40 mg by mouth every evening 1/21/18  Yes Historical Provider, MD   albuterol sulfate  (90 BASE) MCG/ACT inhaler Inhale 2 puffs into the lungs every 6 hours as needed for Wheezing   Yes Interdigital maceration absent to web spaces , Bilateral.  Nails thickened, dystrophic and crumbly, discolored with subungual debris. Fissures absent, Bilateral. Hyperkeratotic tissue is absent  Wound 07/16/18 #1: right heel, plantar surface (Active)   Wound Diabetic 4/1/2019  8:42 AM   Dressing/Treatment Packing 4/15/2019  9:02 AM   Wound Cleansed Wound cleanser 4/15/2019  9:02 AM   Dressing Change Due 02/19/19 2/13/2019  2:08 PM   Wound Length (cm) 4.5 cm 4/15/2019  9:02 AM   Wound Width (cm) 5 cm 4/15/2019  9:02 AM   Wound Depth (cm) 2.5 cm 4/15/2019  9:02 AM   Wound Surface Area (cm^2) 22.5 cm^2 4/15/2019  9:02 AM   Change in Wound Size % (l*w) -596.59 4/15/2019  9:02 AM   Wound Volume (cm^3) 56.25 cm^3 4/15/2019  9:02 AM   Wound Healing % -2080 4/15/2019  9:02 AM   Post-Procedure Length (cm) 1.7 cm 1/28/2019 10:29 AM   Post-Procedure Width (cm) 1.9 cm 1/28/2019 10:29 AM   Post-Procedure Depth (cm) 0.8 cm 1/28/2019 10:29 AM   Post-Procedure Surface Area (cm^2) 3.23 cm^2 1/28/2019 10:29 AM   Post-Procedure Volume (cm^3) 2.58 cm^3 1/28/2019 10:29 AM   Wound Assessment Red 2/25/2019  8:57 AM   Drainage Amount Large 4/15/2019  9:02 AM   Drainage Description Serosanguinous 4/15/2019  9:02 AM   Odor None 4/15/2019  9:02 AM   Margins Other (Comment) 4/1/2019  8:42 AM   Exposed structure Bone 4/1/2019  8:42 AM   [place]-wound Assessment Maceration 4/15/2019  9:02 AM   Non-staged Wound Description Full thickness 4/1/2019  8:42 AM   Red%Wound Bed 100 4/15/2019  9:02 AM   Number of days: 273     Lab: see chart    Asessment: Patient is a 28 y.o. male with:    Diagnosis Orders   1. 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 DM (diabetes mellitus), type 2 with neurological complications (HCC)  IN DEBRIDEMENT, SKIN, SUB-Q TISSUE,=<20 SQ CM       Ulcer Classification:  R heel 3C R and 1C L leg  IDSA  no infection.     Plan:  Sharp excisional debridement took place of the ulceration, sub-cutaneous in nature,  curette and tissue nippers were used for debridement. All non viable and necrotic tissue was removed to promote healing. See wound assessment note for post debridement measurements. Orders Placed This Encounter   Medications    ciprofloxacin (CIPRO) 500 MG tablet     Sig: Take 1 tablet by mouth 2 times daily for 10 days     Dispense:  20 tablet     Refill:  0       Dressing: iodoform gauze packing R, foam L leg  Patient will use compression stockings on a regular basis L leg. Any increase in swelling or redness or signs of ulceration will be seen back for further compression wrap therapy. Also advised importance of continued elevation of the leg. ACE wrap only R leg  Orders Placed This Encounter   Procedures    [place] DEBRIDEMENT, SKIN, SUB-Q TISSUE,=<20 SQ CM   Labs reviewed with pt in detail. All questions answered. Pt requesting sx intervention at this time for the non healing ulcer R heel. Procedure will be a integra application (flowable and BMWD) with sx wound debridement right foot. Patient was educated on the pre-op, anesthesia, and post op course. Risks and complications were discussed such as wound infection, wound dehiscence, re-ocurrence of deformity, hematoma/seroma, neurovascular injury, and RSD. All questions were answered and patient should call back quicker if any further questions arise. Patient was also set up for pre-op clearance from their PCP.   Continue offloading as advised  Follow up in 1 week

## 2019-04-15 NOTE — PATIENT INSTRUCTIONS
Continue with iodoform packing to right heel wound, change dressing daily  Scheduled for Integra placement of right heel on 4/26/19 at [facility]   Start cipro oral antibiotic as directed  Follow up in 1 week with Dr Susie Degroot in wound care

## 2019-04-18 ENCOUNTER — TELEPHONE (OUTPATIENT)
Dept: WOUND CARE | Age: 34
End: 2019-04-18

## 2019-04-22 ENCOUNTER — OFFICE VISIT (OUTPATIENT)
Dept: WOUND CARE | Age: 34
End: 2019-04-22
Payer: MEDICARE

## 2019-04-22 VITALS
DIASTOLIC BLOOD PRESSURE: 74 MMHG | HEIGHT: 73 IN | WEIGHT: 315 LBS | HEART RATE: 82 BPM | TEMPERATURE: 97.8 F | BODY MASS INDEX: 41.75 KG/M2 | SYSTOLIC BLOOD PRESSURE: 118 MMHG | RESPIRATION RATE: 20 BRPM

## 2019-04-22 DIAGNOSIS — L97.414 SKIN ULCER OF RIGHT HEEL WITH NECROSIS OF BONE (HCC): Primary | ICD-10-CM

## 2019-04-22 DIAGNOSIS — E11.49 DM (DIABETES MELLITUS), TYPE 2 WITH NEUROLOGICAL COMPLICATIONS (HCC): ICD-10-CM

## 2019-04-22 PROCEDURE — 99999 PR OFFICE/OUTPT VISIT,PROCEDURE ONLY: CPT | Performed by: PODIATRIST

## 2019-04-22 PROCEDURE — 11042 DBRDMT SUBQ TIS 1ST 20SQCM/<: CPT | Performed by: PODIATRIST

## 2019-04-22 NOTE — PATIENT INSTRUCTIONS
Continue iodoform packing to right heel wound daily and as needed for soiling or dislodged. Report to outpatient surgery as scheduled on Friday 4/26/19. Call clinic for any questions or concerns. SIGNS OF INFECTION  - Redness, swelling, skin hot  - Wound bed turns black or stringy yellow  - Foul odor  - Increased drainage or pus  - Increased pain  - Fever greater than 100F    CALL YOUR DOCTOR OR SEEK MEDICAL ATTENTION IF SIGNS OF INFECTION. DO NOT WAIT UNTIL YOUR NEXT APPOINTMENT    Call the Wound Care Nurse with any other questions or concerns- [phone].

## 2019-04-22 NOTE — PROGRESS NOTES
Subjective:    Marily Myers is a 28 y.o. male who presents for R heel ulcer. No changes seen at home. Pt has no pain. Pt has wore offloading shoe at home. Patient relates pain is Absent . Pain is rated 0 out of 10 and is described as none. Currently denies F/C/N/V. No Known Allergies    Past Medical History:   Diagnosis Date    Diabetes mellitus ([address]     Hyperlipidemia     Prader-Willi syndrome     Sleep apnea        Prior to Admission medications    Medication Sig Start Date End Date Taking?  Authorizing Provider   ciprofloxacin (CIPRO) 500 MG tablet Take 1 tablet by mouth 2 times daily for 10 days 4/15/19 4/25/19 Yes Stella Gabriel DPM   ARIPiprazole (ABILIFY) 10 MG tablet Take 1 tablet by mouth daily 3/8/19  Yes Jigar Roberson MD   loperamide (IMODIUM) 2 MG capsule Take 2 mg by mouth 3 times daily as needed for Diarrhea   Yes Historical Provider, MD   acetaminophen (TYLENOL) 500 MG tablet Take 500 mg by mouth every 6 hours as needed for Pain or Fever (q 4-6 hours prn)   Yes Historical Provider, MD   insulin detemir (LEVEMIR FLEXTOUCH) 100 UNIT/ML injection pen Inject 48 Units into the skin 2 times daily  4/10/18  Yes Historical Provider, MD   insulin aspart (NOVOLOG) 100 UNIT/ML injection vial Inject into the skin 3 times daily (before meals)   Yes Historical Provider, MD   carBAMazepine (TEGRETOL XR) 200 MG extended release tablet Take 200 mg by mouth 2 times daily 3/1/18  Yes Historical Provider, MD   levothyroxine (SYNTHROID) 50 MCG tablet Take 50 mcg by mouth Daily  1/21/18  Yes Historical Provider, MD   pioglitazone (ACTOS) 15 MG tablet Take 30 mg by mouth daily  1/21/18  Yes Historical Provider, MD   rosuvastatin (CRESTOR) 40 MG tablet Take 40 mg by mouth every evening 1/21/18  Yes Historical Provider, MD   albuterol sulfate  (90 BASE) MCG/ACT inhaler Inhale 2 puffs into the lungs every 6 hours as needed for Wheezing   Yes Historical Provider, MD   CPAP Machine MISC by Does not apply route nightly   Yes Historical Provider, MD   metFORMIN (GLUCOPHAGE) 500 MG tablet Take 1,000 mg by mouth 2 times daily (with meals)  1/21/18  Yes Historical Provider, MD       Social History     Tobacco Use    Smoking status: Never Smoker    Smokeless tobacco: Never Used   Substance Use Topics    Alcohol use: No     Alcohol/week: 0.0 oz       Review of Systems: All 12 systems reviewed and pertinent positives noted above. Lower Extremity Physical Examination:     Vitals:   Vitals:    04/22/19 0928   BP: 118/74   Pulse: 82   Resp: 20   Temp: 97.8 °F (36.6 °C)     General: AAO x 3 in NAD. Vascular: DP and PT pulses palpable 2/4, bilateral.  CFT <3 seconds, bilateral.  Hair growth present to the level of the digits, bilateral.  Edema present, bilateral.  Varicosities present, bilateral. Erythema absent, bilateral. Distal Rubor absent bilateral.  Temperature within normal limits bilateral. Hyperpigmentation present bilateral. Atrophic skin yes. Neurological: Sensation Impaired to light touch to level of digits, bilateral.  Protective sensation intact  10/10 sites via 5.07/10g [place]rese Monofilament, bilateral.  negative Tinel's, bilateral.  negative Valleix sign, bilateral.  Vibratory abnormal  bilateral.  Reflexes Decreased bilateral.  Paresthesias positive. Dysthesias negative. Sharp/dull intact bilateral.  Musculoskeletal: Muscle strength 5/5, bilateral.  Pain absent upon palpation bilateral. Normal medial longitudinal arch, bilateral.  Ankle ROM within normal limits,bilateral.  1st MPJ ROM within normal limits, bilateral.  Dorsally contracted digits absent. No other foot deformities. Integument:   Open lesion present, R. Ulcer plantar R heel, overall smaller in size, mild fibrin,  red granular base on edges , no malodor present, no maceration on edges, positive probing still, no undermining. No surrounding signs of infx. L leg superficial ulcer resolved.  Interdigital maceration was removed to promote healing. See wound assessment note for post debridement measurements. Dressing: iodoform gauze packing R  Patient will use compression stockings on a regular basis L leg. Any increase in swelling or redness or signs of ulceration will be seen back for further compression wrap therapy. Also advised importance of continued elevation of the leg. ACE wrap only R leg  integra application (flowable and BMWD) with sx wound debridement right foot set for this Friday.     Continue offloading as advised  Follow up in 1 week

## 2019-04-25 ENCOUNTER — TELEPHONE (OUTPATIENT)
Dept: WOUND CARE | Age: 34
End: 2019-04-25

## 2019-04-26 ENCOUNTER — HOSPITAL ENCOUNTER (OUTPATIENT)
Age: 34
Setting detail: OUTPATIENT SURGERY
Discharge: HOME | End: 2019-04-26
Attending: PODIATRIST | Admitting: PODIATRIST
Payer: MEDICARE

## 2019-04-26 ENCOUNTER — ANESTHESIA EVENT (OUTPATIENT)
Dept: OPERATING ROOM | Age: 34
End: 2019-04-26
Payer: MEDICARE

## 2019-04-26 ENCOUNTER — ANESTHESIA (OUTPATIENT)
Dept: OPERATING ROOM | Age: 34
End: 2019-04-26
Payer: MEDICARE

## 2019-04-26 VITALS
OXYGEN SATURATION: 98 % | HEART RATE: 76 BPM | HEIGHT: 73 IN | TEMPERATURE: 97.1 F | WEIGHT: 315 LBS | SYSTOLIC BLOOD PRESSURE: 110 MMHG | RESPIRATION RATE: 16 BRPM | BODY MASS INDEX: 41.75 KG/M2 | DIASTOLIC BLOOD PRESSURE: 76 MMHG

## 2019-04-26 VITALS
RESPIRATION RATE: 11 BRPM | SYSTOLIC BLOOD PRESSURE: 103 MMHG | OXYGEN SATURATION: 96 % | DIASTOLIC BLOOD PRESSURE: 54 MMHG

## 2019-04-26 DIAGNOSIS — L97.414 SKIN ULCER OF RIGHT HEEL WITH NECROSIS OF BONE (HCC): Primary | ICD-10-CM

## 2019-04-26 LAB — GLUCOSE BLD-MCNC: 136 MG/DL (ref 75–110)

## 2019-04-26 PROCEDURE — 2709999900 HC NON-CHARGEABLE SUPPLY: Performed by: PODIATRIST

## 2019-04-26 PROCEDURE — 3600000002 HC SURGERY LEVEL 2 BASE: Performed by: PODIATRIST

## 2019-04-26 PROCEDURE — 6360000002 HC RX W HCPCS: Performed by: NURSE ANESTHETIST, CERTIFIED REGISTERED

## 2019-04-26 PROCEDURE — 2500000003 HC RX 250 WO HCPCS: Performed by: PODIATRIST

## 2019-04-26 PROCEDURE — 7100000010 HC PHASE II RECOVERY - FIRST 15 MIN: Performed by: PODIATRIST

## 2019-04-26 PROCEDURE — 15275 SKIN SUB GRAFT FACE/NK/HF/G: CPT | Performed by: PODIATRIST

## 2019-04-26 PROCEDURE — 2500000003 HC RX 250 WO HCPCS: Performed by: NURSE ANESTHETIST, CERTIFIED REGISTERED

## 2019-04-26 PROCEDURE — 00400 ANES INTEGUMENTARY SYS NOS: CPT | Performed by: NURSE ANESTHETIST, CERTIFIED REGISTERED

## 2019-04-26 PROCEDURE — 2580000003 HC RX 258: Performed by: PODIATRIST

## 2019-04-26 PROCEDURE — 3700000000 HC ANESTHESIA ATTENDED CARE: Performed by: PODIATRIST

## 2019-04-26 PROCEDURE — 82947 ASSAY GLUCOSE BLOOD QUANT: CPT

## 2019-04-26 PROCEDURE — 3700000001 HC ADD 15 MINUTES (ANESTHESIA): Performed by: PODIATRIST

## 2019-04-26 PROCEDURE — 3600000012 HC SURGERY LEVEL 2 ADDTL 15MIN: Performed by: PODIATRIST

## 2019-04-26 PROCEDURE — 7100000011 HC PHASE II RECOVERY - ADDTL 15 MIN: Performed by: PODIATRIST

## 2019-04-26 DEVICE — INTEGRA FLOWABLE WOUND MATRIX
Type: IMPLANTABLE DEVICE | Site: HEEL | Status: FUNCTIONAL
Brand: INTEGRA

## 2019-04-26 DEVICE — INTEGRA® MESHED BILAYER WOUND MATRIX 2 IN*2 IN (5 CM*5 CM)
Type: IMPLANTABLE DEVICE | Site: HEEL | Status: FUNCTIONAL
Brand: INTEGRA®

## 2019-04-26 RX ORDER — DIPHENHYDRAMINE HYDROCHLORIDE 50 MG/ML
12.5 INJECTION INTRAMUSCULAR; INTRAVENOUS
Status: DISCONTINUED | OUTPATIENT
Start: 2019-04-26 | End: 2019-04-26 | Stop reason: HOSPADM

## 2019-04-26 RX ORDER — ONDANSETRON 2 MG/ML
4 INJECTION INTRAMUSCULAR; INTRAVENOUS
Status: DISCONTINUED | OUTPATIENT
Start: 2019-04-26 | End: 2019-04-26 | Stop reason: HOSPADM

## 2019-04-26 RX ORDER — LIDOCAINE HYDROCHLORIDE 20 MG/ML
INJECTION, SOLUTION EPIDURAL; INFILTRATION; INTRACAUDAL; PERINEURAL PRN
Status: DISCONTINUED | OUTPATIENT
Start: 2019-04-26 | End: 2019-04-26 | Stop reason: SDUPTHER

## 2019-04-26 RX ORDER — MIDAZOLAM HYDROCHLORIDE 1 MG/ML
INJECTION INTRAMUSCULAR; INTRAVENOUS PRN
Status: DISCONTINUED | OUTPATIENT
Start: 2019-04-26 | End: 2019-04-26 | Stop reason: SDUPTHER

## 2019-04-26 RX ORDER — HYDROCODONE BITARTRATE AND ACETAMINOPHEN 5; 325 MG/1; MG/1
1 TABLET ORAL PRN
Status: DISCONTINUED | OUTPATIENT
Start: 2019-04-26 | End: 2019-04-26 | Stop reason: HOSPADM

## 2019-04-26 RX ORDER — HYDROCODONE BITARTRATE AND ACETAMINOPHEN 5; 325 MG/1; MG/1
1 TABLET ORAL EVERY 6 HOURS PRN
Qty: 15 TABLET | Refills: 0 | Status: SHIPPED | OUTPATIENT
Start: 2019-04-26 | End: 2019-05-01

## 2019-04-26 RX ORDER — MORPHINE SULFATE 2 MG/ML
2 INJECTION, SOLUTION INTRAMUSCULAR; INTRAVENOUS EVERY 5 MIN PRN
Status: DISCONTINUED | OUTPATIENT
Start: 2019-04-26 | End: 2019-04-26 | Stop reason: HOSPADM

## 2019-04-26 RX ORDER — PROPOFOL 10 MG/ML
INJECTION, EMULSION INTRAVENOUS PRN
Status: DISCONTINUED | OUTPATIENT
Start: 2019-04-26 | End: 2019-04-26 | Stop reason: SDUPTHER

## 2019-04-26 RX ORDER — FENTANYL CITRATE 50 UG/ML
INJECTION, SOLUTION INTRAMUSCULAR; INTRAVENOUS PRN
Status: DISCONTINUED | OUTPATIENT
Start: 2019-04-26 | End: 2019-04-26 | Stop reason: SDUPTHER

## 2019-04-26 RX ORDER — HYDROCODONE BITARTRATE AND ACETAMINOPHEN 5; 325 MG/1; MG/1
2 TABLET ORAL PRN
Status: DISCONTINUED | OUTPATIENT
Start: 2019-04-26 | End: 2019-04-26 | Stop reason: HOSPADM

## 2019-04-26 RX ORDER — SODIUM CHLORIDE, SODIUM LACTATE, POTASSIUM CHLORIDE, CALCIUM CHLORIDE 600; 310; 30; 20 MG/100ML; MG/100ML; MG/100ML; MG/100ML
INJECTION, SOLUTION INTRAVENOUS CONTINUOUS
Status: DISCONTINUED | OUTPATIENT
Start: 2019-04-26 | End: 2019-04-26 | Stop reason: HOSPADM

## 2019-04-26 RX ADMIN — FENTANYL CITRATE 50 MCG: 50 INJECTION, SOLUTION INTRAMUSCULAR; INTRAVENOUS at 08:22

## 2019-04-26 RX ADMIN — SODIUM CHLORIDE, POTASSIUM CHLORIDE, SODIUM LACTATE AND CALCIUM CHLORIDE: 600; 310; 30; 20 INJECTION, SOLUTION INTRAVENOUS at 08:10

## 2019-04-26 RX ADMIN — MIDAZOLAM HYDROCHLORIDE 1 MG: 1 INJECTION, SOLUTION INTRAMUSCULAR; INTRAVENOUS at 08:19

## 2019-04-26 RX ADMIN — PROPOFOL 200 MG: 10 INJECTION, EMULSION INTRAVENOUS at 08:24

## 2019-04-26 RX ADMIN — LIDOCAINE HYDROCHLORIDE 100 MG: 20 INJECTION, SOLUTION EPIDURAL; INFILTRATION; INTRACAUDAL; PERINEURAL at 08:24

## 2019-04-26 RX ADMIN — LIDOCAINE HYDROCHLORIDE 100 MG: 20 INJECTION, SOLUTION EPIDURAL; INFILTRATION; INTRACAUDAL; PERINEURAL at 08:22

## 2019-04-26 RX ADMIN — MIDAZOLAM HYDROCHLORIDE 1 MG: 1 INJECTION, SOLUTION INTRAMUSCULAR; INTRAVENOUS at 08:22

## 2019-04-26 RX ADMIN — FENTANYL CITRATE 50 MCG: 50 INJECTION, SOLUTION INTRAMUSCULAR; INTRAVENOUS at 08:19

## 2019-04-26 RX ADMIN — SODIUM CHLORIDE, POTASSIUM CHLORIDE, SODIUM LACTATE AND CALCIUM CHLORIDE: 600; 310; 30; 20 INJECTION, SOLUTION INTRAVENOUS at 08:20

## 2019-04-26 ASSESSMENT — PULMONARY FUNCTION TESTS
PIF_VALUE: 0

## 2019-04-26 ASSESSMENT — PAIN SCALES - GENERAL
PAINLEVEL_OUTOF10: 0

## 2019-04-26 ASSESSMENT — PAIN - FUNCTIONAL ASSESSMENT: PAIN_FUNCTIONAL_ASSESSMENT: 0-10

## 2019-04-26 NOTE — ANESTHESIA PRE PROCEDURE
Counseling given: Not Answered      Vital Signs (Current): There were no vitals filed for this visit. BP Readings from Last 3 Encounters:   04/22/19 118/74   04/15/19 128/74   04/09/19 126/70       NPO Status:                                                                                 BMI:   Wt Readings from Last 3 Encounters:   04/22/19 (!) 317 lb 1.6 oz (143.8 kg)   04/15/19 (!) 335 lb 6.4 oz (152.1 kg)   03/18/19 (!) 334 lb (151.5 kg)     There is no height or weight on file to calculate BMI.    CBC:   Lab Results   Component Value Date    WBC 6.7 03/07/2019    RBC 4.56 03/07/2019    HGB 13.2 03/07/2019    HCT 40.4 03/07/2019    MCV 88.7 03/07/2019    RDW 13.7 03/07/2019     03/07/2019       CMP:   Lab Results   Component Value Date     03/07/2019    K 4.3 03/07/2019     03/07/2019    CO2 28 03/07/2019    BUN 13 03/07/2019    CREATININE 0.54 03/07/2019    GFRAA >60 03/07/2019    LABGLOM >60 03/07/2019    GLUCOSE 117 03/07/2019    PROT 6.1 03/16/2015    CALCIUM 9.4 03/07/2019    BILITOT 0.55 03/16/2015    ALKPHOS 73 03/16/2015    AST 35 03/16/2015     03/16/2015       POC Tests: No results for input(s): POCGLU, POCNA, POCK, POCCL, POCBUN, POCHEMO, POCHCT in the last 72 hours. Coags:   Lab Results   Component Value Date    PROTIME 11.0 07/25/2017    INR 1.0 07/25/2017    APTT 24.5 07/25/2017       HCG (If Applicable): No results found for: PREGTESTUR, PREGSERUM, HCG, HCGQUANT     ABGs: No results found for: PHART, PO2ART, ZNX7GUZ, ZUA4RNT, BEART, P4PHVQPJ     Type & Screen (If Applicable):  No results found for: [facility]    Anesthesia Evaluation  Patient summary reviewed and Nursing notes reviewed no history of anesthetic complications:   Airway: Mallampati: II  TM distance: >3 FB   Neck ROM: full  Mouth opening: > = 3 FB Dental:      Comment: Poor condition.  Multiple missing and decayed    Pulmonary: breath sounds intravenous. Anesthetic plan and risks discussed with patient.       Plan discussed with surgical team.                  KRUPA Rosenberg - SHANT   4/26/2019

## 2019-04-26 NOTE — OP NOTE
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 This patient has failed outpatient conservative therapy. Patient is NPO since midnight. H&P, allergy history, and consent form are signed and in the chart. In the pre-operative area an IV was started then patient taken to the operating room and placed on the operating table in a supine position. After induction of anesthesia, a total of 10 cc 50:50 mixture of 1% lidocaine plain and .25% marcaine plain were used to block the operative site. The Right foot was prepped and draped in the usual sterile manner. Anesthesia was checked and was adequate. A #15 blade was used for a incision around ulcer completely. Patient has the first  integra application. Sharp excisional debridement of the ulcer took place for preparation of the wound bed. Ulcer measures 4.4x 4.3x1.8cm. Hemostasis was achieved. integra flowable was prepped and placed on the ulceration deep areas. integra BMWD then put on wound bed in total, held in place with 3-0 nylon simple interuppted sutures. 100% was used and 0% wasted. A dry sterile dressing took place with wound veil, silver alginate,, 4X4s, kiley and ACE. Patient had a prompt hyperemic response noted to the digits upon release of the tourniquet. Patient was taken to the post operative unit with vital signs stable and neurovascular status intact. They will ice and elevate as directed. Other orders were: none. A sx shoe was dispensed for appropriate offloading. Rx given were norco.  Post operative directions were dispensed. Follow up in 3  days.

## 2019-04-26 NOTE — H&P
H&P Update    Patient's History and Physical from was reviewed. Patient physically examined. There has been no change.     Electronically signed by Jb Carrillo DPM on 4/26/2019 at 8:02 AM

## 2019-04-29 ENCOUNTER — OFFICE VISIT (OUTPATIENT)
Dept: WOUND CARE | Age: 34
End: 2019-04-29
Payer: MEDICARE

## 2019-04-29 VITALS
SYSTOLIC BLOOD PRESSURE: 118 MMHG | DIASTOLIC BLOOD PRESSURE: 62 MMHG | HEART RATE: 74 BPM | RESPIRATION RATE: 20 BRPM | WEIGHT: 310.7 LBS | BODY MASS INDEX: 40.99 KG/M2 | TEMPERATURE: 98.1 F

## 2019-04-29 DIAGNOSIS — E11.42 DM TYPE 2 WITH DIABETIC PERIPHERAL NEUROPATHY (HCC): ICD-10-CM

## 2019-04-29 DIAGNOSIS — L97.414 SKIN ULCER OF RIGHT HEEL WITH NECROSIS OF BONE (HCC): Primary | ICD-10-CM

## 2019-04-29 PROCEDURE — 1036F TOBACCO NON-USER: CPT | Performed by: PODIATRIST

## 2019-04-29 PROCEDURE — 99213 OFFICE O/P EST LOW 20 MIN: CPT | Performed by: PODIATRIST

## 2019-04-29 PROCEDURE — G8417 CALC BMI ABV UP PARAM F/U: HCPCS | Performed by: PODIATRIST

## 2019-04-29 PROCEDURE — G8427 DOCREV CUR MEDS BY ELIG CLIN: HCPCS | Performed by: PODIATRIST

## 2019-04-29 PROCEDURE — 3044F HG A1C LEVEL LT 7.0%: CPT | Performed by: PODIATRIST

## 2019-04-29 PROCEDURE — 2022F DILAT RTA XM EVC RTNOPTHY: CPT | Performed by: PODIATRIST

## 2019-04-29 NOTE — PATIENT INSTRUCTIONS
A dry dressing of wound veil, silver alginate, super-absorbent dressing, gauze, and ACE wrap was applied to the right foot today. This is to remain in place until Friday 5/3/19, when it will be changed in the wound clinic. Follow up in 1 week with Dr. Javad Pineda. SIGNS OF INFECTION  - Redness, swelling, skin hot  - Wound bed turns black or stringy yellow  - Foul odor  - Increased drainage or pus  - Increased pain  - Fever greater than 100F    CALL YOUR DOCTOR OR SEEK MEDICAL ATTENTION IF SIGNS OF INFECTION.   DO NOT WAIT UNTIL YOUR NEXT APPOINTMENT    Call the Wound Care Nurse with any other questions or concerns- [phone]

## 2019-04-29 NOTE — PROGRESS NOTES
Subjective:    Mati Tee is a 28 y.o. male who presents for R heel ulcer. Pt has seen more drainage post integra. .  Pt has no pain. Pt has wore offloading shoe at home. Patient relates pain is Absent . Pain is rated 0 out of 10 and is described as none. Currently denies F/C/N/V. No Known Allergies    Past Medical History:   Diagnosis Date    Diabetes mellitus ([facility] [address]     Hyperlipidemia     Prader-Willi syndrome     Sleep apnea        Prior to Admission medications    Medication Sig Start Date End Date Taking?  Authorizing Provider   ARIPiprazole (ABILIFY) 10 MG tablet Take 1 tablet by mouth daily 3/8/19  Yes Yarelis Daugherty MD   loperamide (IMODIUM) 2 MG capsule Take 2 mg by mouth 3 times daily as needed for Diarrhea   Yes Historical Provider, MD   acetaminophen (TYLENOL) 500 MG tablet Take 500 mg by mouth every 6 hours as needed for Pain or Fever (q 4-6 hours prn)   Yes Historical Provider, MD   insulin detemir (LEVEMIR FLEXTOUCH) 100 UNIT/ML injection pen Inject 48 Units into the skin 2 times daily  4/10/18  Yes Historical Provider, MD   insulin aspart (NOVOLOG) 100 UNIT/ML injection vial Inject into the skin 3 times daily (before meals)   Yes Historical Provider, MD   carBAMazepine (TEGRETOL XR) 200 MG extended release tablet Take 200 mg by mouth 2 times daily 3/1/18  Yes Historical Provider, MD   levothyroxine (SYNTHROID) 50 MCG tablet Take 50 mcg by mouth Daily  1/21/18  Yes Historical Provider, MD   pioglitazone (ACTOS) 15 MG tablet Take 30 mg by mouth daily  1/21/18  Yes Historical Provider, MD   rosuvastatin (CRESTOR) 40 MG tablet Take 40 mg by mouth every evening 1/21/18  Yes Historical Provider, MD   albuterol sulfate  (90 BASE) MCG/ACT inhaler Inhale 2 puffs into the lungs every 6 hours as needed for Wheezing   Yes Historical Provider, MD   CPAP Machine MISC by Does not apply route nightly   Yes Historical Provider, MD   metFORMIN (GLUCOPHAGE) 500 MG tablet Take 1,000 mg by mouth 2 times daily (with meals)  1/21/18  Yes Historical Provider, MD   HYDROcodone-acetaminophen (NORCO) 5-325 MG per tablet Take 1 tablet by mouth every 6 hours as needed for Pain for up to 5 days. 4/26/19 5/1/19  Cyndi Trimble DPM       Social History     Tobacco Use    Smoking status: Never Smoker    Smokeless tobacco: Never Used   Substance Use Topics    Alcohol use: No     Alcohol/week: 0.0 oz       Review of Systems: All 12 systems reviewed and pertinent positives noted above. Lower Extremity Physical Examination:     Vitals:   Vitals:    04/29/19 0937   BP: 118/62   Pulse: 74   Resp: 20   Temp: 98.1 °F (36.7 °C)     General: AAO x 3 in NAD. Vascular: DP and PT pulses palpable 2/4, bilateral.  CFT <3 seconds, bilateral.  Hair growth present to the level of the digits, bilateral.  Edema present, bilateral.  Varicosities present, bilateral. Erythema absent, bilateral. Distal Rubor absent bilateral.  Temperature within normal limits bilateral. Hyperpigmentation present bilateral. Atrophic skin yes. Neurological: Sensation Impaired to light touch to level of digits, bilateral.  Protective sensation intact  10/10 sites via 5.07/10g [place]-Tyrese Monofilament, bilateral.  negative Tinel's, bilateral.  negative Valleix sign, bilateral.  Vibratory abnormal  bilateral.  Reflexes Decreased bilateral.  Paresthesias positive. Dysthesias negative. Sharp/dull intact bilateral.  Musculoskeletal: Muscle strength 5/5, bilateral.  Pain absent upon palpation bilateral. Normal medial longitudinal arch, bilateral.  Ankle ROM within normal limits,bilateral.  1st MPJ ROM within normal limits, bilateral.  Dorsally contracted digits absent. No other foot deformities. Integument:   Open lesion present, R. Ulcer plantar R heel, integra in place, distal edge is sheared off already but remainder appears stable,  red granular base on edges , no malodor present, no maceration on edges, no undermining.    No as advised by this office. Risks and complications were discussed when it comes to wound care and inappropriate offloading. Patient is putting themselves at significant increased risk by not offloading appropriately. Dressing: wound veil then silver alginate over integra  Patient will use compression stockings on a regular basis L leg. Any increase in swelling or redness or signs of ulceration will be seen back for further compression wrap therapy. Also advised importance of continued elevation of the leg.  ACE wrap only R leg  Continue offloading as advised  Follow up in 1 week

## 2019-04-30 ENCOUNTER — TELEPHONE (OUTPATIENT)
Dept: WOUND CARE | Age: 34
End: 2019-04-30

## 2019-05-03 ENCOUNTER — NURSE ONLY (OUTPATIENT)
Dept: WOUND CARE | Age: 34
End: 2019-05-03
Payer: MEDICARE

## 2019-05-03 VITALS
SYSTOLIC BLOOD PRESSURE: 118 MMHG | TEMPERATURE: 98.4 F | RESPIRATION RATE: 18 BRPM | DIASTOLIC BLOOD PRESSURE: 74 MMHG | HEART RATE: 74 BPM

## 2019-05-03 DIAGNOSIS — L97.414 SKIN ULCER OF RIGHT HEEL WITH NECROSIS OF BONE (HCC): Primary | ICD-10-CM

## 2019-05-03 PROCEDURE — 99211 OFF/OP EST MAY X REQ PHY/QHP: CPT | Performed by: PODIATRIST

## 2019-05-03 NOTE — PROGRESS NOTES
Patient presented for dressing change per order of Dr. Ignacia Choudhury. Pt presents with dressing in place, intact, clean & dry. Old dressing removed. Large amount of thick red-tan exudate saturates dressing. Periwound and ulcer(s) irrigated with normal saline & patted dry. Wound photo obtained. See wound flow sheet. New dressing of wound contact layer secured with steri-strips, then silver alginate applied, covered with Optilock, secured w/ kerlix & Coban. Pt has f/u appt scheduled w/ Dr. Ignacia Choudhury 5/6/19.

## 2019-05-03 NOTE — PATIENT INSTRUCTIONS
Keep dressing clean and dry. Keep foot elevated, avoid pressure to right heel. Follow up as scheduled.

## 2019-05-06 ENCOUNTER — OFFICE VISIT (OUTPATIENT)
Dept: WOUND CARE | Age: 34
End: 2019-05-06
Payer: MEDICARE

## 2019-05-06 VITALS
TEMPERATURE: 97.5 F | HEART RATE: 74 BPM | WEIGHT: 304 LBS | BODY MASS INDEX: 40.11 KG/M2 | DIASTOLIC BLOOD PRESSURE: 72 MMHG | SYSTOLIC BLOOD PRESSURE: 118 MMHG | RESPIRATION RATE: 20 BRPM

## 2019-05-06 DIAGNOSIS — L97.412 SKIN ULCER OF RIGHT HEEL WITH FAT LAYER EXPOSED (HCC): Primary | ICD-10-CM

## 2019-05-06 DIAGNOSIS — E11.42 DM TYPE 2 WITH DIABETIC PERIPHERAL NEUROPATHY (HCC): ICD-10-CM

## 2019-05-06 PROCEDURE — 11042 DBRDMT SUBQ TIS 1ST 20SQCM/<: CPT | Performed by: PODIATRIST

## 2019-05-06 PROCEDURE — 99999 PR OFFICE/OUTPT VISIT,PROCEDURE ONLY: CPT | Performed by: PODIATRIST

## 2019-05-06 ASSESSMENT — PATIENT HEALTH QUESTIONNAIRE - PHQ9
SUM OF ALL RESPONSES TO PHQ QUESTIONS 1-9: 0
2. FEELING DOWN, DEPRESSED OR HOPELESS: 0
1. LITTLE INTEREST OR PLEASURE IN DOING THINGS: 0
SUM OF ALL RESPONSES TO PHQ QUESTIONS 1-9: 0
SUM OF ALL RESPONSES TO PHQ9 QUESTIONS 1 & 2: 0

## 2019-05-06 NOTE — PROGRESS NOTES
Subjective:    Angelina Tolentino is a 28 y.o. male who presents for R heel ulcer. Pt has seen drainage . Pt has no pain. Pt has wore offloading shoe at home. Patient relates pain is Absent . Pain is rated 0 out of 10 and is described as none. Currently denies F/C/N/V. No Known Allergies    Past Medical History:   Diagnosis Date    Diabetes mellitus ([facility] [address]     Hyperlipidemia     Prader-Willi syndrome     Sleep apnea        Prior to Admission medications    Medication Sig Start Date End Date Taking?  Authorizing Provider   ARIPiprazole (ABILIFY) 10 MG tablet Take 1 tablet by mouth daily 3/8/19  Yes Holley Vazquez MD   loperamide (IMODIUM) 2 MG capsule Take 2 mg by mouth 3 times daily as needed for Diarrhea   Yes Historical Provider, MD   acetaminophen (TYLENOL) 500 MG tablet Take 500 mg by mouth every 6 hours as needed for Pain or Fever (q 4-6 hours prn)   Yes Historical Provider, MD   insulin detemir (LEVEMIR FLEXTOUCH) 100 UNIT/ML injection pen Inject 48 Units into the skin 2 times daily  4/10/18  Yes Historical Provider, MD   insulin aspart (NOVOLOG) 100 UNIT/ML injection vial Inject into the skin 3 times daily (before meals)   Yes Historical Provider, MD   carBAMazepine (TEGRETOL XR) 200 MG extended release tablet Take 200 mg by mouth 2 times daily 3/1/18  Yes Historical Provider, MD   levothyroxine (SYNTHROID) 50 MCG tablet Take 50 mcg by mouth Daily  1/21/18  Yes Historical Provider, MD   pioglitazone (ACTOS) 15 MG tablet Take 30 mg by mouth daily  1/21/18  Yes Historical Provider, MD   rosuvastatin (CRESTOR) 40 MG tablet Take 40 mg by mouth every evening 1/21/18  Yes Historical Provider, MD   albuterol sulfate  (90 BASE) MCG/ACT inhaler Inhale 2 puffs into the lungs every 6 hours as needed for Wheezing   Yes Historical Provider, MD   CPAP Machine MISC by Does not apply route nightly   Yes Historical Provider, MD   metFORMIN (GLUCOPHAGE) 500 MG tablet Take 1,000 mg by mouth 2 times daily (with meals)  1/21/18  Yes Historical Provider, MD       Social History     Tobacco Use    Smoking status: Never Smoker    Smokeless tobacco: Never Used   Substance Use Topics    Alcohol use: No     Alcohol/week: 0.0 oz       Review of Systems: All 12 systems reviewed and pertinent positives noted above. Lower Extremity Physical Examination:     Vitals:   Vitals:    05/06/19 0934   BP: 118/72   Pulse: 74   Resp: 20   Temp: 97.5 °F (36.4 °C)     General: AAO x 3 in NAD. Vascular: DP and PT pulses palpable 2/4, bilateral.  CFT <3 seconds, bilateral.  Hair growth present to the level of the digits, bilateral.  Edema present, bilateral.  Varicosities present, bilateral. Erythema absent, bilateral. Distal Rubor absent bilateral.  Temperature within normal limits bilateral. Hyperpigmentation present bilateral. Atrophic skin yes. Neurological: Sensation Impaired to light touch to level of digits, bilateral.  Protective sensation intact  10/10 sites via 5.07/10g [place]-Tyrese Monofilament, bilateral.  negative Tinel's, bilateral.  negative Valleix sign, bilateral.  Vibratory abnormal  bilateral.  Reflexes Decreased bilateral.  Paresthesias positive. Dysthesias negative. Sharp/dull intact bilateral.  Musculoskeletal: Muscle strength 5/5, bilateral.  Pain absent upon palpation bilateral. Normal medial longitudinal arch, bilateral.  Ankle ROM within normal limits,bilateral.  1st MPJ ROM within normal limits, bilateral.  Dorsally contracted digits absent. No other foot deformities. Integument:   Open lesion present, R. Ulcer plantar R heel, new epithelial skin on edges, red granular base on edges , no malodor present, no maceration on edges, no undermining. No surrounding signs of infx. L leg superficial ulcer resolved. Interdigital maceration absent to web spaces , Bilateral.  Nails thickened, dystrophic and crumbly, discolored with subungual debris.   Fissures absent, Bilateral. Hyperkeratotic tissue is absent  Wound 07/16/18 #1: right heel, plantar surface (Active)   Wound Diabetic 4/22/2019  9:22 AM   Dressing/Treatment Packing 4/15/2019  9:02 AM   Wound Cleansed Rinsed/Irrigated with saline 5/3/2019  2:29 PM   Dressing Change Due 02/19/19 2/13/2019  2:08 PM   Wound Length (cm) 4.4 cm 4/22/2019  9:22 AM   Wound Width (cm) 4.4 cm 4/22/2019  9:22 AM   Wound Depth (cm) 1.8 cm 4/22/2019  9:22 AM   Wound Surface Area (cm^2) 19.36 cm^2 4/22/2019  9:22 AM   Change in Wound Size % (l*w) -499.38 4/22/2019  9:22 AM   Wound Volume (cm^3) 34.85 cm^3 4/22/2019  9:22 AM   Wound Healing % -1251 4/22/2019  9:22 AM   Post-Procedure Length (cm) 4.4 cm 4/22/2019  9:22 AM   Post-Procedure Width (cm) 4.4 cm 4/22/2019  9:22 AM   Post-Procedure Depth (cm) 2 cm 4/22/2019  9:22 AM   Post-Procedure Surface Area (cm^2) 19.36 cm^2 4/22/2019  9:22 AM   Post-Procedure Volume (cm^3) 38.72 cm^3 4/22/2019  9:22 AM   Wound Assessment Red 4/22/2019  9:22 AM   Drainage Amount Large 5/3/2019  2:29 PM   Drainage Description Serosanguinous; Tan 5/3/2019  2:29 PM   Odor Mild 5/3/2019  2:29 PM   Margins Defined edges 4/22/2019  9:22 AM   Exposed structure Bone 4/1/2019  8:42 AM   Galilea-wound Assessment Maceration 5/3/2019  2:29 PM   Non-staged Wound Description Full thickness 5/3/2019  2:29 PM   Red%Wound Bed 100 4/22/2019  9:22 AM   Number of days: 294           Asessment: Patient is a 28 y.o. male with:    Diagnosis Orders   1. Skin ulcer of right heel with fat layer exposed ([address]  CT DEBRIDEMENT, SKIN, SUB-Q TISSUE,=<20 SQ CM   2. DM type 2 with diabetic peripheral neuropathy (HCC)  CT DEBRIDEMENT, SKIN, SUB-Q TISSUE,=<20 SQ CM       Ulcer Classification:  R heel 3C R   IDSA  no infection. Plan:  DM foot ed and exam  Sutures and integra layer removed  Sharp excisional debridement took place of the ulceration, sub-cutaneous in nature,  curette and tissue nippers were used for debridement.   All non viable and necrotic tissue was removed to promote healing. Bleeding was present. See wound assessment note for post debridement measurements. Patient stressed the importance of offloading as advised by this office. Risks and complications were discussed when it comes to wound care and inappropriate offloading. Patient is putting themselves at significant increased risk by not offloading appropriately.   Dressing: promogran  Continue offloading as advised  Follow up in 1 week

## 2019-05-06 NOTE — PATIENT INSTRUCTIONS
Discontinue previous dressing. Apply Promogran or comparable collagen dressing to your wound bed daily. Cut or tear the material to fit the wound and lay it on the wound. Cover with dry gauze and tape or roll gauze in place. Change your dressing daily & whenever drainage comes through the outer dressing even if sooner than the prescribed time. Cleanse wound with normal saline. OK to shower briefly when dressing removed. Continue to limit ambulation, and walk only with off-loading shoe on. Follow up with Dr. Jensen Morrell in 1 week. SIGNS OF INFECTION  - Redness, swelling, skin hot  - Wound bed turns black or stringy yellow  - Foul odor  - Increased drainage or pus  - Increased pain  - Fever greater than 100F    CALL YOUR DOCTOR OR SEEK MEDICAL ATTENTION IF SIGNS OF INFECTION.   DO NOT WAIT UNTIL YOUR NEXT APPOINTMENT    Call the Wound Care Nurse with any other questions or concerns- [phone]

## 2019-05-08 ENCOUNTER — TELEPHONE (OUTPATIENT)
Dept: WOUND CARE | Age: 34
End: 2019-05-08

## 2019-05-08 NOTE — TELEPHONE ENCOUNTER
Faxed confirmation received that pt is eligible for Apligraf (total of 5 treatments for the life of the wound), and NuShield, and Puraply AM, used in addition to Apligraf, \"not to exceed 10 total combined\" treatments \"if more than one specific product is used\". Pt has already received 4 Apligraf treatments on this right heel wound, the most recent being 2/19/19.

## 2019-05-13 ENCOUNTER — OFFICE VISIT (OUTPATIENT)
Dept: WOUND CARE | Age: 34
End: 2019-05-13
Payer: MEDICARE

## 2019-05-13 ENCOUNTER — TELEPHONE (OUTPATIENT)
Dept: SURGERY | Age: 34
End: 2019-05-13

## 2019-05-13 VITALS
DIASTOLIC BLOOD PRESSURE: 78 MMHG | BODY MASS INDEX: 40.77 KG/M2 | HEART RATE: 72 BPM | SYSTOLIC BLOOD PRESSURE: 128 MMHG | TEMPERATURE: 98.4 F | WEIGHT: 309 LBS

## 2019-05-13 DIAGNOSIS — E11.42 DM TYPE 2 WITH DIABETIC PERIPHERAL NEUROPATHY (HCC): ICD-10-CM

## 2019-05-13 DIAGNOSIS — L97.412 SKIN ULCER OF RIGHT HEEL WITH FAT LAYER EXPOSED (HCC): Primary | ICD-10-CM

## 2019-05-13 PROCEDURE — 15275 SKIN SUB GRAFT FACE/NK/HF/G: CPT | Performed by: PODIATRIST

## 2019-05-13 PROCEDURE — 99999 PR OFFICE/OUTPT VISIT,PROCEDURE ONLY: CPT | Performed by: PODIATRIST

## 2019-05-13 NOTE — PATIENT INSTRUCTIONS
Plan:  Patient presents today for the first Apligraf application. Sharp excisional debridement of the ulcer took place for preparation of the wound bed. Hemostasis was achieved. Apligraf was checked and good pH. Apligraf was prepped and placed on the ulceration. 50% was used and 50% wasted. Please see chart for exact ulcer measurements. Apligraf was then secured in place using wound veil and steri strips. Dressing took place with mineral oil gauze, dry gauze,opti lock kiley,  and coban. Patient will continue compression therapy and elevation of leg as advised. Patient will follow up in 1 week. Change outer dressing every 2 days.  :  opti lock, roll gauze, coban wrap

## 2019-05-13 NOTE — PROGRESS NOTES
Subjective:    Diogo Bond is a 28 y.o. male who presents for R heel ulcer. Pt has seen drainage . Pt has no pain. Pt has wore offloading shoe at home. Patient relates pain is Absent . Pain is rated 0 out of 10 and is described as none. Currently denies F/C/N/V. No Known Allergies    Past Medical History:   Diagnosis Date    Diabetes mellitus ([facility] [address]     Hyperlipidemia     Prader-Willi syndrome     Sleep apnea        Prior to Admission medications    Medication Sig Start Date End Date Taking?  Authorizing Provider   ARIPiprazole (ABILIFY) 10 MG tablet Take 1 tablet by mouth daily 3/8/19  Yes Marily Ocasio MD   loperamide (IMODIUM) 2 MG capsule Take 2 mg by mouth 3 times daily as needed for Diarrhea   Yes Historical Provider, MD   acetaminophen (TYLENOL) 500 MG tablet Take 500 mg by mouth every 6 hours as needed for Pain or Fever (q 4-6 hours prn)   Yes Historical Provider, MD   insulin detemir (LEVEMIR FLEXTOUCH) 100 UNIT/ML injection pen Inject 48 Units into the skin 2 times daily  4/10/18  Yes Historical Provider, MD   insulin aspart (NOVOLOG) 100 UNIT/ML injection vial Inject into the skin 3 times daily (before meals)   Yes Historical Provider, MD   carBAMazepine (TEGRETOL XR) 200 MG extended release tablet Take 200 mg by mouth 2 times daily 3/1/18  Yes Historical Provider, MD   levothyroxine (SYNTHROID) 50 MCG tablet Take 50 mcg by mouth Daily  1/21/18  Yes Historical Provider, MD   pioglitazone (ACTOS) 15 MG tablet Take 30 mg by mouth daily  1/21/18  Yes Historical Provider, MD   rosuvastatin (CRESTOR) 40 MG tablet Take 40 mg by mouth every evening 1/21/18  Yes Historical Provider, MD   albuterol sulfate  (90 BASE) MCG/ACT inhaler Inhale 2 puffs into the lungs every 6 hours as needed for Wheezing   Yes Historical Provider, MD   CPAP Machine MISC by Does not apply route nightly   Yes Historical Provider, MD   metFORMIN (GLUCOPHAGE) 500 MG tablet Take 1,000 mg by mouth 2 times daily (with meals)  1/21/18  Yes Historical Provider, MD       Social History     Tobacco Use    Smoking status: Never Smoker    Smokeless tobacco: Never Used   Substance Use Topics    Alcohol use: No     Alcohol/week: 0.0 oz       Review of Systems: All 12 systems reviewed and pertinent positives noted above. Lower Extremity Physical Examination:     Vitals:   Vitals:    05/13/19 1041   BP: 128/78   Pulse: 72   Temp: 98.4 °F (36.9 °C)     General: AAO x 3 in NAD. Vascular: DP and PT pulses palpable 2/4, bilateral.  CFT <3 seconds, bilateral.  Hair growth present to the level of the digits, bilateral.  Edema present, bilateral.  Varicosities present, bilateral. Erythema absent, bilateral. Distal Rubor absent bilateral.  Temperature within normal limits bilateral. Hyperpigmentation present bilateral. Atrophic skin yes. Neurological: Sensation Impaired to light touch to level of digits, bilateral.  Protective sensation intact  10/10 sites via 5.07/10g [place]-Tyrese Monofilament, bilateral.  negative Tinel's, bilateral.  negative Valleix sign, bilateral.  Vibratory abnormal  bilateral.  Reflexes Decreased bilateral.  Paresthesias positive. Dysthesias negative. Sharp/dull intact bilateral.  Musculoskeletal: Muscle strength 5/5, bilateral.  Pain absent upon palpation bilateral. Normal medial longitudinal arch, bilateral.  Ankle ROM within normal limits,bilateral.  1st MPJ ROM within normal limits, bilateral.  Dorsally contracted digits absent. No other foot deformities. Integument:   Open lesion present, R. Ulcer plantar R heel, new epithelial skin on edges, red granular base on edges , no malodor present, no maceration on edges, no undermining. No surrounding signs of infx. L leg superficial ulcer resolved. Interdigital maceration absent to web spaces , Bilateral.  Nails thickened, dystrophic and crumbly, discolored with subungual debris.   Fissures absent, Bilateral. Hyperkeratotic tissue is absent  Wound 07/16/18 #1: right heel, plantar surface (Active)   Wound Diabetic 5/6/2019  9:58 AM   Dressing/Treatment Collagen 5/13/2019 10:31 AM   Wound Cleansed Wound cleanser 5/13/2019 10:31 AM   Dressing Change Due 02/19/19 2/13/2019  2:08 PM   Wound Length (cm) 3 cm 5/13/2019 10:31 AM   Wound Width (cm) 2.8 cm 5/13/2019 10:31 AM   Wound Depth (cm) 2 cm 5/13/2019 10:31 AM   Wound Surface Area (cm^2) 8.4 cm^2 5/13/2019 10:31 AM   Change in Wound Size % (l*w) -160.06 5/13/2019 10:31 AM   Wound Volume (cm^3) 16.8 cm^3 5/13/2019 10:31 AM   Wound Healing % -551 5/13/2019 10:31 AM   Post-Procedure Length (cm) 4.4 cm 4/22/2019  9:22 AM   Post-Procedure Width (cm) 4.4 cm 4/22/2019  9:22 AM   Post-Procedure Depth (cm) 2 cm 4/22/2019  9:22 AM   Post-Procedure Surface Area (cm^2) 19.36 cm^2 4/22/2019  9:22 AM   Post-Procedure Volume (cm^3) 38.72 cm^3 4/22/2019  9:22 AM   Wound Assessment Red 4/22/2019  9:22 AM   Drainage Amount Large 5/13/2019 10:31 AM   Drainage Description Serosanguinous 5/13/2019 10:31 AM   Odor None 5/13/2019 10:31 AM   Margins Defined edges 4/22/2019  9:22 AM   Exposed structure Bone 4/1/2019  8:42 AM   Galilea-wound Assessment Maceration 5/13/2019 10:31 AM   Non-staged Wound Description Full thickness 5/6/2019  9:58 AM   Red%Wound Bed 100 5/13/2019 10:31 AM   Number of days: 301           Asessment: Patient is a 28 y.o. male with:    Diagnosis Orders   1. Skin ulcer of right heel with fat layer exposed ([address]  [place] SUB GRFT F/S/N/H/F/G/M/D /<100SCM /<1ST 25 SCM    [place] APLIGRAF   2. DM type 2 with diabetic peripheral neuropathy (HCC)  [place] SUB GRFT F/S/N/H/F/G/M/D /<100SCM /<1ST 25 SCM    [place] APLIGRAF       Ulcer Classification:  R heel 3C R   IDSA  no infection. Plan:  Patient presents today for the first Apligraf application. Sharp excisional debridement of the ulcer took place for preparation of the wound bed. Hemostasis was achieved. Apligraf was checked and good pH.   Apligraf was prepped and placed on the ulceration. 50% was used and 50% wasted. Please see chart for exact ulcer measurements. Apligraf was then secured in place using wound veil and steri strips. Dressing took place with mineral oil gauze, dry gauze, kiley, and coban. Patient will continue offloading as advised. Patient will follow up in 1 week.   Continue offloading as advised

## 2019-05-17 ENCOUNTER — TELEPHONE (OUTPATIENT)
Dept: WOUND CARE | Age: 34
End: 2019-05-17

## 2019-05-20 ENCOUNTER — OFFICE VISIT (OUTPATIENT)
Dept: WOUND CARE | Age: 34
End: 2019-05-20
Payer: MEDICARE

## 2019-05-20 VITALS
TEMPERATURE: 98.2 F | DIASTOLIC BLOOD PRESSURE: 78 MMHG | SYSTOLIC BLOOD PRESSURE: 120 MMHG | RESPIRATION RATE: 20 BRPM | HEART RATE: 88 BPM

## 2019-05-20 DIAGNOSIS — L97.921 SKIN ULCER OF LEFT LOWER LEG, LIMITED TO BREAKDOWN OF SKIN (HCC): Primary | ICD-10-CM

## 2019-05-20 DIAGNOSIS — E11.42 DM TYPE 2 WITH DIABETIC PERIPHERAL NEUROPATHY (HCC): ICD-10-CM

## 2019-05-20 DIAGNOSIS — I87.2 CHRONIC VENOUS INSUFFICIENCY: ICD-10-CM

## 2019-05-20 DIAGNOSIS — L97.412 SKIN ULCER OF RIGHT HEEL WITH FAT LAYER EXPOSED (HCC): ICD-10-CM

## 2019-05-20 PROCEDURE — G8427 DOCREV CUR MEDS BY ELIG CLIN: HCPCS | Performed by: PODIATRIST

## 2019-05-20 PROCEDURE — 1036F TOBACCO NON-USER: CPT | Performed by: PODIATRIST

## 2019-05-20 PROCEDURE — 3044F HG A1C LEVEL LT 7.0%: CPT | Performed by: PODIATRIST

## 2019-05-20 PROCEDURE — 99213 OFFICE O/P EST LOW 20 MIN: CPT | Performed by: PODIATRIST

## 2019-05-20 PROCEDURE — 2022F DILAT RTA XM EVC RTNOPTHY: CPT | Performed by: PODIATRIST

## 2019-05-20 PROCEDURE — G8417 CALC BMI ABV UP PARAM F/U: HCPCS | Performed by: PODIATRIST

## 2019-05-20 PROCEDURE — 15275 SKIN SUB GRAFT FACE/NK/HF/G: CPT | Performed by: PODIATRIST

## 2019-05-20 PROCEDURE — 97597 DBRDMT OPN WND 1ST 20 CM/<: CPT | Performed by: PODIATRIST

## 2019-05-20 NOTE — PATIENT INSTRUCTIONS
[facility] skin substitute, 1st application placed today. Keep dressing clean and dry, do not get wet. Change \"outer\" portion of dressing on Thursday or Friday this week. Leave steri-strips in place. Change calcium alginate and super-absorbent dressing, secure with gauze and Coban. Bordered foam dressing to left lower leg wound, change Thursday or Friday this week, and as needed for soiling or dislodged. Cleanse wound with wound wash, normal saline, or mild soap & water-rinse well, pat dry. Follow up as scheduled next Tuesday, with Dr. Yuko Rashid. SIGNS OF INFECTION  - Redness, swelling, skin hot  - Wound bed turns black or stringy yellow  - Foul odor  - Increased drainage or pus  - Increased pain  - Fever greater than 100F    CALL YOUR DOCTOR OR SEEK MEDICAL ATTENTION IF SIGNS OF INFECTION. DO NOT WAIT UNTIL YOUR NEXT APPOINTMENT    Call the Wound Care Nurse with any other questions or concerns- [phone].

## 2019-05-20 NOTE — PROGRESS NOTES
Subjective:    Jonathan Abdi is a 28 y.o. male who presents for new ulcer L leg. Pt has no idea how long it has been present. No injury. No new tx tried. Pt still has R heel ulcer. Pt has seen drainage . Pt has no pain. Pt has wore offloading shoe at home. Patient relates pain is Absent . Pain is rated 0 out of 10 and is described as none. Currently denies F/C/N/V. No Known Allergies    Past Medical History:   Diagnosis Date    Diabetes mellitus ([facility] [place] [address]     Hyperlipidemia     Prader-Willi syndrome     Sleep apnea        Prior to Admission medications    Medication Sig Start Date End Date Taking?  Authorizing Provider   ARIPiprazole (ABILIFY) 10 MG tablet Take 1 tablet by mouth daily 3/8/19  Yes Jovita Crum MD   loperamide (IMODIUM) 2 MG capsule Take 2 mg by mouth 3 times daily as needed for Diarrhea   Yes Historical Provider, MD   acetaminophen (TYLENOL) 500 MG tablet Take 500 mg by mouth every 6 hours as needed for Pain or Fever (q 4-6 hours prn)   Yes Historical Provider, MD   insulin detemir (LEVEMIR FLEXTOUCH) 100 UNIT/ML injection pen Inject 48 Units into the skin 2 times daily  4/10/18  Yes Historical Provider, MD   insulin aspart (NOVOLOG) 100 UNIT/ML injection vial Inject into the skin 3 times daily (before meals)   Yes Historical Provider, MD   carBAMazepine (TEGRETOL XR) 200 MG extended release tablet Take 200 mg by mouth 2 times daily 3/1/18  Yes Historical Provider, MD   levothyroxine (SYNTHROID) 50 MCG tablet Take 50 mcg by mouth Daily  1/21/18  Yes Historical Provider, MD   pioglitazone (ACTOS) 15 MG tablet Take 30 mg by mouth daily  1/21/18  Yes Historical Provider, MD   rosuvastatin (CRESTOR) 40 MG tablet Take 40 mg by mouth every evening 1/21/18  Yes Historical Provider, MD   albuterol sulfate  (90 BASE) MCG/ACT inhaler Inhale 2 puffs into the lungs every 6 hours as needed for Wheezing   Yes Historical Provider, MD   CPAP Machine MISC by Does not apply route nightly Yes Historical Provider, MD   metFORMIN (GLUCOPHAGE) 500 MG tablet Take 1,000 mg by mouth 2 times daily (with meals)  1/21/18  Yes Historical Provider, MD       Social History     Tobacco Use    Smoking status: Never Smoker    Smokeless tobacco: Never Used   Substance Use Topics    Alcohol use: No     Alcohol/week: 0.0 oz       Review of Systems: All 12 systems reviewed and pertinent positives noted above. Lower Extremity Physical Examination:     Vitals:   Vitals:    05/20/19 0931   BP: 120/78   Pulse: 88   Resp: 20   Temp: 98.2 °F (36.8 °C)     General: AAO x 3 in NAD. Vascular: DP and PT pulses palpable 2/4, bilateral.  CFT <3 seconds, bilateral.  Hair growth present to the level of the digits, bilateral.  Edema present, bilateral.  Varicosities present, bilateral. Erythema absent, bilateral. Distal Rubor absent bilateral.  Temperature within normal limits bilateral. Hyperpigmentation present bilateral. Atrophic skin yes. Neurological: Sensation Impaired to light touch to level of digits, bilateral.  Protective sensation intact  10/10 sites via 5.07/10g [place]-Tyrese Monofilament, bilateral.  negative Tinel's, bilateral.  negative Valleix sign, bilateral.  Vibratory abnormal  bilateral.  Reflexes Decreased bilateral.  Paresthesias positive. Dysthesias negative. Sharp/dull intact bilateral.  Musculoskeletal: Muscle strength 5/5, bilateral.  Pain absent upon palpation bilateral. Normal medial longitudinal arch, bilateral.  Ankle ROM within normal limits,bilateral.  1st MPJ ROM within normal limits, bilateral.  Dorsally contracted digits absent. No other foot deformities. Integument:   Open lesion present, R. Ulcer plantar R heel, new epithelial skin on edges, red granular base on edges , no malodor present, no maceration on edges, no undermining. No surrounding signs of infx. L leg l ulcer shows positive fibrin, healthy red granular base, no probing no undermining no malodor.   No surrounding signs of infx. . Interdigital maceration absent to web spaces , Bilateral.  Nails thickened, dystrophic and crumbly, discolored with subungual debris.   Fissures absent, Bilateral. Hyperkeratotic tissue is absent  Wound 07/16/18 #1: right heel, plantar surface (Active)   Wound Diabetic 5/20/2019  9:32 AM   Dressing/Treatment Collagen 5/13/2019 10:31 AM   Wound Cleansed Rinsed/Irrigated with saline 5/20/2019  9:32 AM   Dressing Change Due 02/19/19 2/13/2019  2:08 PM   Wound Length (cm) 2.8 cm 5/20/2019  9:32 AM   Wound Width (cm) 3 cm 5/20/2019  9:32 AM   Wound Depth (cm) 1.3 cm 5/20/2019  9:32 AM   Wound Surface Area (cm^2) 8.4 cm^2 5/20/2019  9:32 AM   Change in Wound Size % (l*w) -160.06 5/20/2019  9:32 AM   Wound Volume (cm^3) 10.92 cm^3 5/20/2019  9:32 AM   Wound Healing % -323 5/20/2019  9:32 AM   Post-Procedure Length (cm) 4.4 cm 4/22/2019  9:22 AM   Post-Procedure Width (cm) 4.4 cm 4/22/2019  9:22 AM   Post-Procedure Depth (cm) 2 cm 4/22/2019  9:22 AM   Post-Procedure Surface Area (cm^2) 19.36 cm^2 4/22/2019  9:22 AM   Post-Procedure Volume (cm^3) 38.72 cm^3 4/22/2019  9:22 AM   Wound Assessment Red 4/22/2019  9:22 AM   Drainage Amount Large 5/20/2019  9:32 AM   Drainage Description Serosanguinous 5/13/2019 10:31 AM   Odor Mild 5/20/2019  9:32 AM   Margins Defined edges 4/22/2019  9:22 AM   Exposed structure Bone 4/1/2019  8:42 AM   Galilea-wound Assessment Maceration 5/20/2019  9:32 AM   Non-staged Wound Description Full thickness 5/20/2019  9:32 AM   Red%Wound Bed 100 5/20/2019  9:32 AM   Number of days: 308       Wound 05/20/19 #2: left lower leg (Active)   Wound Traumatic 5/20/2019  9:34 AM   Wound Length (cm) 0.9 cm 5/20/2019  9:34 AM   Wound Width (cm) 1.3 cm 5/20/2019  9:34 AM   Wound Depth (cm) 0.2 cm 5/20/2019  9:34 AM   Wound Surface Area (cm^2) 1.17 cm^2 5/20/2019  9:34 AM   Wound Volume (cm^3) 0.23 cm^3 5/20/2019  9:34 AM   Margins Defined edges 5/20/2019  9:34 AM   Galilea-wound Assessment

## 2019-05-20 NOTE — PROGRESS NOTES
NuShield Treatment Note    NAME:  Rhianna Jameson  YOB: 1985  MEDICAL RECORD NUMBER:  [id number]  DATE:  5/20/2019    Goal:  Patient will receive safe and proper application of skin substitute. Patient will comply with caring for dressing, offloading and reporting complications. Expiration date checked immediately prior to use. Package intact prior to use and no damage noted. Nushield was removed from protective sterile packaging by provider and applied to prepared ulcer bed. NuShield applied with notch to Top Upper Right Corner. NuShield was hydrated with sterile normal saline per provider. NuShield was applied to right heel wound and affixed with steri-strips by the provider. Applied nonadherent and calcium alginate (Secondary Dressing)   Coban or ace wrap was applied to secure graft and decrease edema. Patient/caregiver was instructed not to remove dressing and to keep it clean and dry. NuShield may be applied a total of 10 times per wound over a 12 week period. Additionally NuShield may only be used every 12 months per wound. Date of first application of NuShield for this current wound is May 20, 2019.       Application # 1 out of 5           Guidelines followed    Electronically signed by Dhruv Malhotra RN on 5/20/2019 at 9:47 AM

## 2019-05-30 ENCOUNTER — TELEPHONE (OUTPATIENT)
Dept: WOUND CARE | Age: 34
End: 2019-05-30

## 2019-06-03 ENCOUNTER — OFFICE VISIT (OUTPATIENT)
Dept: WOUND CARE | Age: 34
End: 2019-06-03
Payer: COMMERCIAL

## 2019-06-03 ENCOUNTER — TELEPHONE (OUTPATIENT)
Dept: WOUND CARE | Age: 34
End: 2019-06-03

## 2019-06-03 VITALS
TEMPERATURE: 97.4 F | RESPIRATION RATE: 18 BRPM | DIASTOLIC BLOOD PRESSURE: 60 MMHG | HEART RATE: 80 BPM | SYSTOLIC BLOOD PRESSURE: 110 MMHG

## 2019-06-03 DIAGNOSIS — L97.412 SKIN ULCER OF RIGHT HEEL WITH FAT LAYER EXPOSED (HCC): Primary | ICD-10-CM

## 2019-06-03 DIAGNOSIS — E11.42 DM TYPE 2 WITH DIABETIC PERIPHERAL NEUROPATHY (HCC): ICD-10-CM

## 2019-06-03 PROCEDURE — 99999 PR OFFICE/OUTPT VISIT,PROCEDURE ONLY: CPT | Performed by: PODIATRIST

## 2019-06-03 PROCEDURE — 97597 DBRDMT OPN WND 1ST 20 CM/<: CPT | Performed by: PODIATRIST

## 2019-06-03 RX ORDER — POTASSIUM CHLORIDE 20 MEQ/1
20 TABLET, EXTENDED RELEASE ORAL 2 TIMES DAILY
COMMUNITY
Start: 2019-05-28

## 2019-06-03 RX ORDER — AMOXICILLIN AND CLAVULANATE POTASSIUM 875; 125 MG/1; MG/1
1 TABLET, FILM COATED ORAL 2 TIMES DAILY
COMMUNITY
Start: 2019-05-28 | End: 2019-06-07

## 2019-06-03 RX ORDER — OXYBUTYNIN CHLORIDE 5 MG/1
5 TABLET, EXTENDED RELEASE ORAL DAILY
Refills: 0 | COMMUNITY
Start: 2019-05-17

## 2019-06-03 RX ORDER — ESCITALOPRAM OXALATE 10 MG/1
10 TABLET ORAL DAILY
Refills: 0 | COMMUNITY
Start: 2019-05-28

## 2019-06-03 RX ORDER — NYSTATIN 100000 [USP'U]/G
100000 POWDER TOPICAL 4 TIMES DAILY
COMMUNITY
Start: 2019-05-26 | End: 2020-10-20 | Stop reason: ALTCHOICE

## 2019-06-03 RX ORDER — PANTOPRAZOLE SODIUM 40 MG/1
40 TABLET, DELAYED RELEASE ORAL DAILY
Refills: 0 | COMMUNITY
Start: 2019-05-28

## 2019-06-03 RX ORDER — FUROSEMIDE 40 MG/1
40 TABLET ORAL DAILY
Refills: 0 | COMMUNITY
Start: 2019-05-28

## 2019-06-03 ASSESSMENT — PATIENT HEALTH QUESTIONNAIRE - PHQ9: DEPRESSION UNABLE TO ASSESS: PT REFUSES

## 2019-06-03 NOTE — PATIENT INSTRUCTIONS
Bordered foam to right heel wound, change every other day. Cleanse wound with normal saline or wound cleanser. Elevate foot to keep pressure off of wound, use off-loading shoe at all times. Use patient's compression stockings to both lower legs, on every morning, off at bedtime. Follow up in 1 week with Dr. Binta Jones. SIGNS OF INFECTION  - Redness, swelling, skin hot  - Wound bed turns black or stringy yellow  - Foul odor  - Increased drainage or pus  - Increased pain  - Fever greater than 100F    CALL YOUR DOCTOR OR SEEK MEDICAL ATTENTION IF SIGNS OF INFECTION.   DO NOT WAIT UNTIL YOUR NEXT APPOINTMENT    Call the Wound Care Nurse with any other questions or concerns- [phone]

## 2019-06-03 NOTE — PROGRESS NOTES
Subjective:    Rhianna Jameson is a 28 y.o. male who presents for ulcer L leg and R heel. .  Pt has same dressing on, hospital called last week and advised to remove everything and switch to foam qd. this does not appear to have happened. Pt has seen drainage . Pt has no pain. Pt has wore offloading shoe at home. Patient relates pain is Absent . Pain is rated 0 out of 10 and is described as none. Currently denies F/C/N/V. No Known Allergies    Past Medical History:   Diagnosis Date    Diabetes mellitus ([facility] [address]     Hyperlipidemia     Prader-Willi syndrome     Sleep apnea        Prior to Admission medications    Medication Sig Start Date End Date Taking?  Authorizing Provider   amoxicillin-clavulanate (AUGMENTIN) 875-125 MG per tablet Take 1 tablet by mouth 2 times daily 5/28/19 6/7/19 Yes Historical Provider, MD   oxybutynin (DITROPAN-XL) 5 MG extended release tablet Take 5 mg by mouth daily 5/17/19  Yes Historical Provider, MD   furosemide (LASIX) 40 MG tablet Take 40 mg by mouth daily 5/28/19  Yes Historical Provider, MD   escitalopram (LEXAPRO) 10 MG tablet Take 10 mg by mouth daily 5/28/19  Yes Historical Provider, MD   nystatin (MYCOSTATIN) 540528 UNIT/GM powder Apply 100,000 Units topically 4 times daily 5/26/19  Yes Historical Provider, MD   potassium chloride (KLOR-CON M) 20 MEQ extended release tablet Take 20 mEq by mouth 2 times daily 5/28/19 6/27/19 Yes Historical Provider, MD   pantoprazole (PROTONIX) 40 MG tablet Take 40 mg by mouth daily 5/28/19  Yes Historical Provider, MD   Probiotic Product (PROBIOTIC-10 PO) Take by mouth daily 5/29/19  Yes Historical Provider, MD   ARIPiprazole (ABILIFY) 10 MG tablet Take 1 tablet by mouth daily 3/8/19  Yes Bay Reyes MD   loperamide (IMODIUM) 2 MG capsule Take 2 mg by mouth 3 times daily as needed for Diarrhea   Yes Historical Provider, MD   acetaminophen (TYLENOL) 500 MG tablet Take 500 mg by mouth every 6 hours as needed for Pain or Fever (q 4-6 hours prn)   Yes Historical Provider, MD   insulin detemir (LEVEMIR FLEXTOUCH) 100 UNIT/ML injection pen Inject 48 Units into the skin 2 times daily  4/10/18  Yes Historical Provider, MD   insulin aspart (NOVOLOG) 100 UNIT/ML injection vial Inject into the skin 3 times daily (before meals)   Yes Historical Provider, MD   carBAMazepine (TEGRETOL XR) 200 MG extended release tablet Take 200 mg by mouth 2 times daily 3/1/18  Yes Historical Provider, MD   levothyroxine (SYNTHROID) 50 MCG tablet Take 25 mcg by mouth Daily  1/21/18  Yes Historical Provider, MD   pioglitazone (ACTOS) 15 MG tablet Take 30 mg by mouth daily  1/21/18  Yes Historical Provider, MD   rosuvastatin (CRESTOR) 40 MG tablet Take 40 mg by mouth every evening 1/21/18  Yes Historical Provider, MD   albuterol sulfate  (90 BASE) MCG/ACT inhaler Inhale 2 puffs into the lungs every 6 hours as needed for Wheezing   Yes Historical Provider, MD   CPAP Machine MISC by Does not apply route nightly   Yes Historical Provider, MD   metFORMIN (GLUCOPHAGE) 500 MG tablet Take 1,000 mg by mouth 2 times daily (with meals)  1/21/18  Yes Historical Provider, MD       Social History     Tobacco Use    Smoking status: Never Smoker    Smokeless tobacco: Never Used   Substance Use Topics    Alcohol use: No     Alcohol/week: 0.0 oz       Review of Systems: All 12 systems reviewed and pertinent positives noted above. Lower Extremity Physical Examination:     Vitals:   Vitals:    06/03/19 0915   BP: 110/60   Pulse: 80   Resp: 18   Temp: 97.4 °F (36.3 °C)     General: AAO x 3 in NAD. Vascular: DP and PT pulses palpable 2/4, bilateral.  CFT <3 seconds, bilateral.  Hair growth present to the level of the digits, bilateral.  Edema present, bilateral.  Varicosities present, bilateral. Erythema absent, bilateral. Distal Rubor absent bilateral.  Temperature within normal limits bilateral. Hyperpigmentation present bilateral. Atrophic skin yes.   Neurological: Sensation Impaired to light touch to level of digits, bilateral.  Protective sensation intact  10/10 sites via 5.07/10g [place] Monofilament, bilateral.  negative Tinel's, bilateral.  negative Valleix sign, bilateral.  Vibratory abnormal  bilateral.  Reflexes Decreased bilateral.  Paresthesias positive. Dysthesias negative. Sharp/dull intact bilateral.  Musculoskeletal: Muscle strength 5/5, bilateral.  Pain absent upon palpation bilateral. Normal medial longitudinal arch, bilateral.  Ankle ROM within normal limits,bilateral.  1st MPJ ROM within normal limits, bilateral.  Dorsally contracted digits absent. No other foot deformities. Integument:   Open lesion present, R. Ulcer plantar R heel, to lateral edges shows non viable blister with raw area underneath,  originla area shows new epithelial skin on edges, red granular base on edges , no malodor present, no maceration on edges, no undermining. No surrounding signs of infx. L leg l ulcer shows positive fibrin, healthy red granular base, no probing no undermining no malodor. No surrounding signs of infx. . Interdigital maceration absent to web spaces , Bilateral.  Nails thickened, dystrophic and crumbly, discolored with subungual debris.   Fissures absent, Bilateral. Hyperkeratotic tissue is absent  Wound 07/16/18 #1: right heel, plantar surface (Active)   Wound Diabetic 6/3/2019  9:49 AM   Dressing/Treatment Foam 6/3/2019  9:49 AM   Wound Cleansed Rinsed/Irrigated with saline 6/3/2019  9:49 AM   Dressing Change Due 02/19/19 2/13/2019  2:08 PM   Wound Length (cm) 2.8 cm 5/20/2019  9:32 AM   Wound Width (cm) 3 cm 5/20/2019  9:32 AM   Wound Depth (cm) 1.3 cm 5/20/2019  9:32 AM   Wound Surface Area (cm^2) 8.4 cm^2 5/20/2019  9:32 AM   Change in Wound Size % (l*w) -160.06 5/20/2019  9:32 AM   Wound Volume (cm^3) 10.92 cm^3 5/20/2019  9:32 AM   Wound Healing % -323 5/20/2019  9:32 AM   Post-Procedure Length (cm) 2.8 cm 6/3/2019  9:49 AM   Post-Procedure Width (cm) 4 cm 6/3/2019  9:49 AM   Post-Procedure Depth (cm) 1.4 cm 6/3/2019  9:49 AM   Post-Procedure Surface Area (cm^2) 11.2 cm^2 6/3/2019  9:49 AM   Post-Procedure Volume (cm^3) 15.68 cm^3 6/3/2019  9:49 AM   Wound Assessment Red 6/3/2019  9:49 AM   Drainage Amount Large 6/3/2019  9:49 AM   Drainage Description Serosanguinous 6/3/2019  9:49 AM   Odor Mild 6/3/2019  9:49 AM   Margins Other (Comment) 6/3/2019  9:49 AM   Exposed structure Bone 4/1/2019  8:42 AM   Galilea-wound Assessment Maceration 6/3/2019  9:49 AM   Non-staged Wound Description Full thickness 6/3/2019  9:49 AM   Red%Wound Bed 100 6/3/2019  9:49 AM   Number of days: 322       Wound 05/20/19 #2: left lower leg (Active)   Wound Traumatic 5/20/2019  9:34 AM   Dressing/Treatment Foam 5/20/2019  9:34 AM   Dressing Change Due 05/23/19 5/20/2019  9:34 AM   Wound Length (cm) 0.9 cm 5/20/2019  9:34 AM   Wound Width (cm) 1.3 cm 5/20/2019  9:34 AM   Wound Depth (cm) 0.2 cm 5/20/2019  9:34 AM   Wound Surface Area (cm^2) 1.17 cm^2 5/20/2019  9:34 AM   Wound Volume (cm^3) 0.23 cm^3 5/20/2019  9:34 AM   Post-Procedure Length (cm) 1 cm 5/20/2019  9:34 AM   Post-Procedure Width (cm) 1.3 cm 5/20/2019  9:34 AM   Post-Procedure Depth (cm) 0.3 cm 5/20/2019  9:34 AM   Post-Procedure Surface Area (cm^2) 1.3 cm^2 5/20/2019  9:34 AM   Post-Procedure Volume (cm^3) 0.39 cm^3 5/20/2019  9:34 AM   Wound Assessment Epithelialization 6/3/2019  9:49 AM   Drainage Amount Moderate 5/20/2019  9:34 AM   Drainage Description Serosanguinous 5/20/2019  9:34 AM   Odor None 5/20/2019  9:34 AM   Margins Defined edges 5/20/2019  9:34 AM   Galilae-wound Assessment Dry 6/3/2019  9:49 AM   Non-staged Wound Description Full thickness 5/20/2019  9:34 AM   Red%Wound Bed 100 5/20/2019  9:34 AM   Number of days: 14         Asessment: Patient is a 28 y.o. male with:    Diagnosis Orders   1.  Skin ulcer of right heel with fat layer exposed ([facility] [address]  [place] DEBRIDEMENT OPEN WOUND 20 SQ CM<   2. DM type 2 with diabetic peripheral neuropathy (HCC)  [place] DEBRIDEMENT OPEN WOUND 20 SQ CM<       Ulcer Classification:  R heel 3C R , 1C L leg  IDSA  no infection. Plan: Active wound management took place 100% non excisional with the use of  curette and tissue nipper. All non viable tissue (including epidermis and/or dermis) and bio burden was removed to promote healing. Bleeding was present. Please see chart for exact measurements, if not documented then size was less than 20 sq cm. Continue offloading as advised  Dressing: foam  Patient will continue compression stockings on a regular basis. Any increase in swelling or redness or signs of ulceration will be seen back for further compression wrap therapy. Also advised importance of continued elevation of the leg. Follow up in 1 week, potential nushield appliaction near future.

## 2019-06-10 ENCOUNTER — OFFICE VISIT (OUTPATIENT)
Dept: WOUND CARE | Age: 34
End: 2019-06-10
Payer: MEDICARE

## 2019-06-10 ENCOUNTER — HOSPITAL ENCOUNTER (OUTPATIENT)
Dept: GENERAL RADIOLOGY | Age: 34
Discharge: HOME | End: 2019-06-12
Payer: COMMERCIAL

## 2019-06-10 VITALS
DIASTOLIC BLOOD PRESSURE: 66 MMHG | RESPIRATION RATE: 20 BRPM | HEART RATE: 74 BPM | SYSTOLIC BLOOD PRESSURE: 120 MMHG | TEMPERATURE: 97.8 F

## 2019-06-10 DIAGNOSIS — E11.42 DM TYPE 2 WITH DIABETIC PERIPHERAL NEUROPATHY (HCC): ICD-10-CM

## 2019-06-10 DIAGNOSIS — L97.412 SKIN ULCER OF RIGHT HEEL WITH FAT LAYER EXPOSED (HCC): ICD-10-CM

## 2019-06-10 DIAGNOSIS — L97.412 SKIN ULCER OF RIGHT HEEL WITH FAT LAYER EXPOSED (HCC): Primary | ICD-10-CM

## 2019-06-10 PROCEDURE — 11042 DBRDMT SUBQ TIS 1ST 20SQCM/<: CPT | Performed by: PODIATRIST

## 2019-06-10 PROCEDURE — 73650 X-RAY EXAM OF HEEL: CPT

## 2019-06-10 PROCEDURE — 99999 PR OFFICE/OUTPT VISIT,PROCEDURE ONLY: CPT | Performed by: PODIATRIST

## 2019-06-10 ASSESSMENT — PATIENT HEALTH QUESTIONNAIRE - PHQ9: DEPRESSION UNABLE TO ASSESS: PT REFUSES

## 2019-06-10 NOTE — PROGRESS NOTES
Subjective:    Rigoberto Martinez is a 28 y.o. male who presents for ulcer R heel. Pt has seen drainage . Pt currently at nursing home. Pt has no pain. Pt has wore offloading shoe. Patient relates pain is Absent . Pain is rated 0 out of 10 and is described as none. Currently denies F/C/N/V. No Known Allergies    Past Medical History:   Diagnosis Date    Diabetes mellitus ([facility] [address]     Hyperlipidemia     Prader-Willi syndrome     Sleep apnea        Prior to Admission medications    Medication Sig Start Date End Date Taking?  Authorizing Provider   oxybutynin (DITROPAN-XL) 5 MG extended release tablet Take 5 mg by mouth daily 5/17/19  Yes Historical Provider, MD   furosemide (LASIX) 40 MG tablet Take 40 mg by mouth daily 5/28/19  Yes Historical Provider, MD   escitalopram (LEXAPRO) 10 MG tablet Take 10 mg by mouth daily 5/28/19  Yes Historical Provider, MD   nystatin (MYCOSTATIN) 328583 UNIT/GM powder Apply 100,000 Units topically 4 times daily 5/26/19  Yes Historical Provider, MD   potassium chloride (KLOR-CON M) 20 MEQ extended release tablet Take 20 mEq by mouth 2 times daily 5/28/19 6/27/19 Yes Historical Provider, MD   pantoprazole (PROTONIX) 40 MG tablet Take 40 mg by mouth daily 5/28/19  Yes Historical Provider, MD   Probiotic Product (PROBIOTIC-10 PO) Take by mouth daily 5/29/19  Yes Historical Provider, MD   ARIPiprazole (ABILIFY) 10 MG tablet Take 1 tablet by mouth daily 3/8/19  Yes Huyen Reyna MD   acetaminophen (TYLENOL) 500 MG tablet Take 500 mg by mouth every 6 hours as needed for Pain or Fever (q 4-6 hours prn)   Yes Historical Provider, MD   insulin detemir (LEVEMIR FLEXTOUCH) 100 UNIT/ML injection pen Inject 48 Units into the skin 2 times daily  4/10/18  Yes Historical Provider, MD   insulin aspart (NOVOLOG) 100 UNIT/ML injection vial Inject into the skin 3 times daily (before meals)   Yes Historical Provider, MD   levothyroxine (SYNTHROID) 50 MCG tablet Take 25 mcg by mouth Daily  1/21/18 Paresthesias positive. Dysthesias negative. Sharp/dull intact bilateral.  Musculoskeletal: Muscle strength 5/5, bilateral.  Pain absent upon palpation bilateral. Normal medial longitudinal arch, bilateral.  Ankle ROM within normal limits,bilateral.  1st MPJ ROM within normal limits, bilateral.  Dorsally contracted digits absent. No other foot deformities. Integument:   Open lesion present, R. Ulcer plantar R heel, to lateral edges shows non viable tissue with raw area underneath,  originla area shows increase depth into sub q tissue this week, red granular base on edges , no probing currently, no malodor present, no maceration on edges, no undermining. No surrounding signs of infx. . Interdigital maceration absent to web spaces , Bilateral.  Nails thickened, dystrophic and crumbly, discolored with subungual debris.   Fissures absent, Bilateral. Hyperkeratotic tissue is absent  Wound 07/16/18 #1: right heel, plantar surface (Active)   Wound Diabetic 6/10/2019 10:10 AM   Dressing/Treatment Foam 6/10/2019 10:10 AM   Wound Cleansed Rinsed/Irrigated with saline 6/10/2019 10:10 AM   Dressing Change Due 02/19/19 2/13/2019  2:08 PM   Wound Length (cm) 5 cm 6/10/2019 10:10 AM   Wound Width (cm) 7 cm 6/10/2019 10:10 AM   Wound Depth (cm) 1.5 cm 6/10/2019 10:10 AM   Wound Surface Area (cm^2) 35 cm^2 6/10/2019 10:10 AM   Change in Wound Size % (l*w) -983.59 6/10/2019 10:10 AM   Wound Volume (cm^3) 52.5 cm^3 6/10/2019 10:10 AM   Wound Healing % -1935 6/10/2019 10:10 AM   Post-Procedure Length (cm) 2.8 cm 6/3/2019  9:49 AM   Post-Procedure Width (cm) 4 cm 6/3/2019  9:49 AM   Post-Procedure Depth (cm) 1.4 cm 6/3/2019  9:49 AM   Post-Procedure Surface Area (cm^2) 11.2 cm^2 6/3/2019  9:49 AM   Post-Procedure Volume (cm^3) 15.68 cm^3 6/3/2019  9:49 AM   Wound Assessment Red;Drainage;Dusky 6/10/2019 10:10 AM   Drainage Amount Copious 6/10/2019 10:10 AM   Drainage Description Serosanguinous 6/10/2019 10:10 AM   Odor Mild 6/10/2019 10:10 AM   Margins Other (Comment) 6/3/2019  9:49 AM   Exposed structure Bone 4/1/2019  8:42 AM   Galilea-wound Assessment Maceration 6/10/2019 10:10 AM   Non-staged Wound Description Full thickness 6/10/2019 10:10 AM   Red%Wound Bed 100 6/3/2019  9:49 AM   Number of days: 329         Asessment: Patient is a 28 y.o. male with:    Diagnosis Orders   1. Skin ulcer of right heel with fat layer exposed (HCC)  XR CALCANEUS RIGHT (MIN 2 VIEWS)    PA DEBRIDEMENT, SKIN, SUB-Q TISSUE,=<20 SQ CM   2. DM type 2 with diabetic peripheral neuropathy (HCC)  XR CALCANEUS RIGHT (MIN 2 VIEWS)    PA DEBRIDEMENT, SKIN, SUB-Q TISSUE,=<20 SQ CM       Ulcer Classification:  R heel 3C R   IDSA  no infection. Plan:  Sharp excisional debridement took place of the ulceration, sub-cutaneous in nature,  curette and tissue nippers were used for debridement. All non viable and necrotic tissue was removed to promote healing. Bleeding was present. See wound assessment note for post debridement measurements. Continue offloading as advised  Dressing: foam  Orders Placed This Encounter   Procedures    XR CALCANEUS RIGHT (MIN 2 VIEWS)     Standing Status:   Future     Standing Expiration Date:   6/10/2020     Scheduling Instructions:      WB    PA DEBRIDEMENT, SKIN, SUB-Q TISSUE,=<20 SQ CM     Further recs post xrays. Patient will continue compression stockings on a regular basis. Any increase in swelling or redness or signs of ulceration will be seen back for further compression wrap therapy. Also advised importance of continued elevation of the leg.   Follow up in 1 week

## 2019-06-10 NOTE — PATIENT INSTRUCTIONS
Add silver alginate dressing to right heel wound to manage drainage. Continue foam dressing to right heel. Change dressing every day, and as needed to manage drainage. Float right heel at all times while at rest.   Continue to use off-loading shoe to right foot at all times. X rays of right heel were obtained today. Follow up next week in wound clinic as scheduled. SIGNS OF INFECTION  - Redness, swelling, skin hot  - Wound bed turns black or stringy yellow  - Foul odor  - Increased drainage or pus  - Increased pain  - Fever greater than 100F    CALL YOUR DOCTOR OR SEEK MEDICAL ATTENTION IF SIGNS OF INFECTION. DO NOT WAIT UNTIL YOUR NEXT APPOINTMENT    Call the Wound Care Nurse with any other questions or concerns- [phone].

## 2019-06-11 ENCOUNTER — TELEPHONE (OUTPATIENT)
Dept: WOUND CARE | Age: 34
End: 2019-06-11

## 2019-06-17 ENCOUNTER — OFFICE VISIT (OUTPATIENT)
Dept: WOUND CARE | Age: 34
End: 2019-06-17
Payer: MEDICARE

## 2019-06-17 ENCOUNTER — TELEPHONE (OUTPATIENT)
Dept: WOUND CARE | Age: 34
End: 2019-06-17

## 2019-06-17 VITALS — HEART RATE: 82 BPM | SYSTOLIC BLOOD PRESSURE: 136 MMHG | TEMPERATURE: 97.3 F | DIASTOLIC BLOOD PRESSURE: 80 MMHG

## 2019-06-17 DIAGNOSIS — L97.412 SKIN ULCER OF RIGHT HEEL WITH FAT LAYER EXPOSED (HCC): Primary | ICD-10-CM

## 2019-06-17 DIAGNOSIS — M86.9 OSTEOMYELITIS OF RIGHT FOOT, UNSPECIFIED TYPE (HCC): ICD-10-CM

## 2019-06-17 DIAGNOSIS — E11.42 DM TYPE 2 WITH DIABETIC PERIPHERAL NEUROPATHY (HCC): ICD-10-CM

## 2019-06-17 PROBLEM — E11.621 DIABETIC ULCER OF RIGHT HEEL ASSOCIATED WITH TYPE 2 DIABETES MELLITUS, WITH FAT LAYER EXPOSED (HCC): Status: ACTIVE | Noted: 2019-02-28

## 2019-06-17 PROBLEM — F41.9 ANXIETY: Status: ACTIVE | Noted: 2019-03-14

## 2019-06-17 PROBLEM — Z86.711 HISTORY OF PULMONARY EMBOLISM: Status: ACTIVE | Noted: 2017-09-20

## 2019-06-17 PROBLEM — E29.1 HYPOTESTOSTERONEMIA IN MALE: Status: ACTIVE | Noted: 2017-05-31

## 2019-06-17 PROCEDURE — 3044F HG A1C LEVEL LT 7.0%: CPT | Performed by: PODIATRIST

## 2019-06-17 PROCEDURE — 2022F DILAT RTA XM EVC RTNOPTHY: CPT | Performed by: PODIATRIST

## 2019-06-17 PROCEDURE — G8417 CALC BMI ABV UP PARAM F/U: HCPCS | Performed by: PODIATRIST

## 2019-06-17 PROCEDURE — 11042 DBRDMT SUBQ TIS 1ST 20SQCM/<: CPT | Performed by: PODIATRIST

## 2019-06-17 PROCEDURE — G8427 DOCREV CUR MEDS BY ELIG CLIN: HCPCS | Performed by: PODIATRIST

## 2019-06-17 PROCEDURE — 1036F TOBACCO NON-USER: CPT | Performed by: PODIATRIST

## 2019-06-17 PROCEDURE — 99213 OFFICE O/P EST LOW 20 MIN: CPT | Performed by: PODIATRIST

## 2019-06-17 NOTE — PROGRESS NOTES
Subjective:    Doni Robb is a 28 y.o. male who presents for ulcer R heel. Pt presents with telfa dressing on only. Pt currently at nursing home. Pt has no pain. Pt has wore offloading shoe. Patient relates pain is Absent . Pain is rated 0 out of 10 and is described as none. Currently denies F/C/N/V. No Known Allergies    Past Medical History:   Diagnosis Date    Diabetes mellitus ([facility] [address]     Hyperlipidemia     Prader-Willi syndrome     Sleep apnea        Prior to Admission medications    Medication Sig Start Date End Date Taking?  Authorizing Provider   oxybutynin (DITROPAN-XL) 5 MG extended release tablet Take 5 mg by mouth daily 5/17/19   Historical Provider, MD   furosemide (LASIX) 40 MG tablet Take 40 mg by mouth daily 5/28/19   Historical Provider, MD   escitalopram (LEXAPRO) 10 MG tablet Take 10 mg by mouth daily 5/28/19   Historical Provider, MD   nystatin (MYCOSTATIN) 488457 UNIT/GM powder Apply 100,000 Units topically 4 times daily 5/26/19   Historical Provider, MD   potassium chloride (KLOR-CON M) 20 MEQ extended release tablet Take 20 mEq by mouth 2 times daily 5/28/19 6/27/19  Historical Provider, MD   pantoprazole (PROTONIX) 40 MG tablet Take 40 mg by mouth daily 5/28/19   Historical Provider, MD   Probiotic Product (PROBIOTIC-10 PO) Take by mouth daily 5/29/19   Historical Provider, MD   ARIPiprazole (ABILIFY) 10 MG tablet Take 1 tablet by mouth daily 3/8/19   Carolyne Lindsay MD   loperamide (IMODIUM) 2 MG capsule Take 2 mg by mouth 3 times daily as needed for Diarrhea    Historical Provider, MD   acetaminophen (TYLENOL) 500 MG tablet Take 500 mg by mouth every 6 hours as needed for Pain or Fever (q 4-6 hours prn)    Historical Provider, MD   insulin detemir (LEVEMIR FLEXTOUCH) 100 UNIT/ML injection pen Inject 48 Units into the skin 2 times daily  4/10/18   Historical Provider, MD   insulin aspart (NOVOLOG) 100 UNIT/ML injection vial Inject into the skin 3 times daily (before meals) Sharp/dull intact bilateral.  Musculoskeletal: Muscle strength 5/5, bilateral.  Pain absent upon palpation bilateral. Normal medial longitudinal arch, bilateral.  Ankle ROM within normal limits,bilateral.  1st MPJ ROM within normal limits, bilateral.  Dorsally contracted digits absent. No other foot deformities. Integument:   Open lesion present, R. Ulcer plantar R heel, to lateral edges shows non viable tissue   original area shows depth into sub q tissue , red granular base on edges , no probing currently, no malodor present, no maceration on edges, no undermining. No surrounding signs of infx. . Interdigital maceration absent to web spaces , Bilateral.  Nails thickened, dystrophic and crumbly, discolored with subungual debris. Fissures absent, Bilateral. Hyperkeratotic tissue  Wound 07/16/18 #1: right heel, plantar surface (Active)   Wound Diabetic 6/10/2019 10:10 AM   Dressing/Treatment Alginate with Ag; Foam 6/10/2019 10:10 AM   Wound Cleansed Rinsed/Irrigated with saline 6/17/2019  9:36 AM   Dressing Change Due 02/19/19 2/13/2019  2:08 PM   Wound Length (cm) 6.5 cm 6/17/2019  9:36 AM   Wound Width (cm) 5.3 cm 6/17/2019  9:36 AM   Wound Depth (cm) 1.2 cm 6/17/2019  9:36 AM   Wound Surface Area (cm^2) 34.45 cm^2 6/17/2019  9:36 AM   Change in Wound Size % (l*w) -966.56 6/17/2019  9:36 AM   Wound Volume (cm^3) 41.34 cm^3 6/17/2019  9:36 AM   Wound Healing % -1502 6/17/2019  9:36 AM   Post-Procedure Length (cm) 5 cm 6/10/2019 10:10 AM   Post-Procedure Width (cm) 7.2 cm 6/10/2019 10:10 AM   Post-Procedure Depth (cm) 1.6 cm 6/10/2019 10:10 AM   Post-Procedure Surface Area (cm^2) 36 cm^2 6/10/2019 10:10 AM   Post-Procedure Volume (cm^3) 57.6 cm^3 6/10/2019 10:10 AM   Wound Assessment Yellow;Gray;Pale;Pink 6/17/2019  9:36 AM   Drainage Amount Large 6/17/2019  9:36 AM   Drainage Description Serosanguinous 6/17/2019  9:36 AM   Odor None 6/17/2019  9:36 AM   Margins Undefined edges 6/17/2019  9:36 AM   Exposed structure Bone 4/1/2019  8:42 AM   Galilea-wound Assessment Dry 6/17/2019  9:36 AM   Non-staged Wound Description Full thickness 6/10/2019 10:10 AM   [place]%Wound Bed 10 6/17/2019  9:36 AM   Red%Wound Bed 100 6/3/2019  9:49 AM   Yellow%Wound Bed 75 6/17/2019  9:36 AM   Other%Wound Bed 15 6/17/2019  9:36 AM   Number of days: 336      is absent  Xray: see report    Asessment: Patient is a 28 y.o. male with:    Diagnosis Orders   1. Skin ulcer of right heel with fat layer exposed ([address]     2. DM type 2 with diabetic peripheral neuropathy ([address]     3. Osteomyelitis of right foot, unspecified type ([address]         Ulcer Classification:  R heel 3C R   IDSA  no infection. Plan:  Sharp excisional debridement took place of the ulceration, sub-cutaneous in nature,  curette and tissue nippers were used for debridement. All non viable and necrotic tissue was removed to promote healing. Bleeding was present. See wound assessment note for post debridement measurements. Continue offloading as advised  Nursing home will be contacted since he presented with telfa dressing only on wound  Dressing: silver alginate in deeper area then foam  X rays reviewed with the pt in detail. All questions answered. Appears to have chronic OM changes with reactive formation at area of previous sx and bone debridement,  No erosive changes. If wound progresses in wrong direction then will look into ID consult. Patient will continue compression stockings on a regular basis. Any increase in swelling or redness or signs of ulceration will be seen back for further compression wrap therapy. Also advised importance of continued elevation of the leg.   Follow up in 1 week

## 2019-06-17 NOTE — PATIENT INSTRUCTIONS
Dressing: Continue silver alginate in deep wound. Cover entire wound with foam. Hold with roll gauze. DO NOT USE TELFA OR A NON-ADHERENT.

## 2019-06-24 ENCOUNTER — HOSPITAL ENCOUNTER (OUTPATIENT)
Age: 34
Setting detail: SPECIMEN
Discharge: HOME | End: 2019-06-24
Payer: MEDICARE

## 2019-06-24 ENCOUNTER — OFFICE VISIT (OUTPATIENT)
Dept: WOUND CARE | Age: 34
End: 2019-06-24
Payer: MEDICARE

## 2019-06-24 VITALS
RESPIRATION RATE: 18 BRPM | DIASTOLIC BLOOD PRESSURE: 70 MMHG | SYSTOLIC BLOOD PRESSURE: 128 MMHG | HEART RATE: 76 BPM | TEMPERATURE: 96.7 F

## 2019-06-24 DIAGNOSIS — E11.42 DM TYPE 2 WITH DIABETIC PERIPHERAL NEUROPATHY (HCC): ICD-10-CM

## 2019-06-24 DIAGNOSIS — M86.9 OSTEOMYELITIS OF RIGHT FOOT, UNSPECIFIED TYPE (HCC): ICD-10-CM

## 2019-06-24 DIAGNOSIS — L97.412 SKIN ULCER OF RIGHT HEEL WITH FAT LAYER EXPOSED (HCC): Primary | ICD-10-CM

## 2019-06-24 DIAGNOSIS — L97.412 SKIN ULCER OF RIGHT HEEL WITH FAT LAYER EXPOSED (HCC): ICD-10-CM

## 2019-06-24 PROCEDURE — 87077 CULTURE AEROBIC IDENTIFY: CPT

## 2019-06-24 PROCEDURE — 87186 SC STD MICRODIL/AGAR DIL: CPT

## 2019-06-24 PROCEDURE — 1036F TOBACCO NON-USER: CPT | Performed by: PODIATRIST

## 2019-06-24 PROCEDURE — 87205 SMEAR GRAM STAIN: CPT

## 2019-06-24 PROCEDURE — 87070 CULTURE OTHR SPECIMN AEROBIC: CPT

## 2019-06-24 PROCEDURE — 99213 OFFICE O/P EST LOW 20 MIN: CPT | Performed by: PODIATRIST

## 2019-06-24 PROCEDURE — 97597 DBRDMT OPN WND 1ST 20 CM/<: CPT | Performed by: PODIATRIST

## 2019-06-24 PROCEDURE — 3044F HG A1C LEVEL LT 7.0%: CPT | Performed by: PODIATRIST

## 2019-06-24 PROCEDURE — G8427 DOCREV CUR MEDS BY ELIG CLIN: HCPCS | Performed by: PODIATRIST

## 2019-06-24 PROCEDURE — 87075 CULTR BACTERIA EXCEPT BLOOD: CPT

## 2019-06-24 PROCEDURE — 86403 PARTICLE AGGLUT ANTBDY SCRN: CPT

## 2019-06-24 PROCEDURE — 87176 TISSUE HOMOGENIZATION CULTR: CPT

## 2019-06-24 PROCEDURE — 2022F DILAT RTA XM EVC RTNOPTHY: CPT | Performed by: PODIATRIST

## 2019-06-24 PROCEDURE — G8417 CALC BMI ABV UP PARAM F/U: HCPCS | Performed by: PODIATRIST

## 2019-06-24 RX ORDER — CIPROFLOXACIN 500 MG/1
500 TABLET, FILM COATED ORAL 2 TIMES DAILY
Qty: 20 TABLET | Refills: 0 | Status: SHIPPED | OUTPATIENT
Start: 2019-06-24 | End: 2019-07-04

## 2019-06-24 NOTE — PATIENT INSTRUCTIONS
Silver alginate to wound, then use foam dressing over the the whole heel, keep in place with roll guaze. Please change dressing if it gets wet or soiled. Cleanse wound with normal saline or in the shower if allowed. Pat dry. Cut silver alginate to fit wound, pack lightly. (Silver Alginate will swell as it absorbs drainage.) Cover with dry gauze. Hold with tape, roll gauze or coban. Change at least every three days and whenever drainage comes through the outer dressing. SIGNS OF INFECTION  - Redness, swelling, skin hot  - Wound bed turns black or stringy yellow  - Foul odor  - Increased drainage or pus  - Increased pain  - Fever greater than 100F    CALL YOUR DOCTOR OR SEEK MEDICAL ATTENTION IF SIGNS OF INFECTION. DO NOT WAIT UNTIL YOUR NEXT APPOINTMENT    Call the Wound Care Nurse with any other questions or concerns- [phone]    Continue to wear off loading shoe when weight bearing at all times. Return next week as scheduled to see .

## 2019-06-24 NOTE — PROGRESS NOTES
Subjective:    Milagros King is a 28 y.o. male who presents for worsening ulcer R heel. Pt mom presents with a lot of questions about tx options for the wound. Pt presents with appropriate dressing in place. Pt currently at nursing home. Pt has no pain. Pt has wore offloading shoe. Pt mom states she found a urine soaked dressing on the foot this past week. Currently denies F/C/N/V. No Known Allergies    Past Medical History:   Diagnosis Date    Diabetes mellitus ([address]     Hyperlipidemia     Prader-Willi syndrome     Sleep apnea        Prior to Admission medications    Medication Sig Start Date End Date Taking?  Authorizing Provider   ciprofloxacin (CIPRO) 500 MG tablet Take 1 tablet by mouth 2 times daily for 10 days 6/24/19 7/4/19 Yes Kaushal Willoughby DPM   oxybutynin (DITROPAN-XL) 5 MG extended release tablet Take 5 mg by mouth daily 5/17/19  Yes Historical Provider, MD   furosemide (LASIX) 40 MG tablet Take 40 mg by mouth daily 5/28/19  Yes Historical Provider, MD   escitalopram (LEXAPRO) 10 MG tablet Take 10 mg by mouth daily 5/28/19  Yes Historical Provider, MD   nystatin (MYCOSTATIN) 111578 UNIT/GM powder Apply 100,000 Units topically 4 times daily 5/26/19  Yes Historical Provider, MD   potassium chloride (KLOR-CON M) 20 MEQ extended release tablet Take 20 mEq by mouth 2 times daily 5/28/19 6/27/19 Yes Historical Provider, MD   pantoprazole (PROTONIX) 40 MG tablet Take 40 mg by mouth daily 5/28/19  Yes Historical Provider, MD   Probiotic Product (PROBIOTIC-10 PO) Take by mouth daily 5/29/19  Yes Historical Provider, MD   ARIPiprazole (ABILIFY) 10 MG tablet Take 1 tablet by mouth daily 3/8/19  Yes Tequila Tinoco MD   loperamide (IMODIUM) 2 MG capsule Take 2 mg by mouth 3 times daily as needed for Diarrhea   Yes Historical Provider, MD   acetaminophen (TYLENOL) 500 MG tablet Take 500 mg by mouth every 6 hours as needed for Pain or Fever (q 4-6 hours prn)   Yes Historical Provider, MD   insulin detemir (LEVEMIR FLEXTOUCH) 100 UNIT/ML injection pen Inject 48 Units into the skin 2 times daily  4/10/18  Yes Historical Provider, MD   insulin aspart (NOVOLOG) 100 UNIT/ML injection vial Inject into the skin 3 times daily (before meals)   Yes Historical Provider, MD   carBAMazepine (TEGRETOL XR) 200 MG extended release tablet Take 200 mg by mouth 2 times daily 3/1/18  Yes Historical Provider, MD   levothyroxine (SYNTHROID) 50 MCG tablet Take 25 mcg by mouth Daily  1/21/18  Yes Historical Provider, MD   pioglitazone (ACTOS) 15 MG tablet Take 30 mg by mouth daily  1/21/18  Yes Historical Provider, MD   rosuvastatin (CRESTOR) 40 MG tablet Take 40 mg by mouth every evening 1/21/18  Yes Historical Provider, MD   albuterol sulfate  (90 BASE) MCG/ACT inhaler Inhale 2 puffs into the lungs every 6 hours as needed for Wheezing   Yes Historical Provider, MD   CPAP Machine MISC by Does not apply route nightly   Yes Historical Provider, MD   metFORMIN (GLUCOPHAGE) 500 MG tablet Take 1,000 mg by mouth 2 times daily (with meals)  1/21/18  Yes Historical Provider, MD       Social History     Tobacco Use    Smoking status: Never Smoker    Smokeless tobacco: Never Used   Substance Use Topics    Alcohol use: No     Alcohol/week: 0.0 oz       Review of Systems: All 12 systems reviewed and pertinent positives noted above. Lower Extremity Physical Examination:     Vitals:   Vitals:    06/24/19 0928   BP: 128/70   Pulse: 76   Resp: 18   Temp: 96.7 °F (35.9 °C)     General: AAO x 3 in NAD. Vascular: DP and PT pulses palpable 2/4, bilateral.  CFT <3 seconds, bilateral.  Hair growth present to the level of the digits, bilateral.  Edema present, bilateral.  Varicosities present, bilateral. Erythema absent, bilateral. Distal Rubor absent bilateral.  Temperature within normal limits bilateral. Hyperpigmentation present bilateral. Atrophic skin yes.   Neurological: Sensation Impaired to light touch to level of digits, bilateral.  Protective sensation intact  10/10 sites via 5.07/10g [place]rese Monofilament, bilateral.  negative Tinel's, bilateral.  negative Valleix sign, bilateral.  Vibratory abnormal  bilateral.  Reflexes Decreased bilateral.  Paresthesias positive. Dysthesias negative. Sharp/dull intact bilateral.  Musculoskeletal: Muscle strength 5/5, bilateral.  Pain absent upon palpation bilateral. Normal medial longitudinal arch, bilateral.  Ankle ROM within normal limits,bilateral.  1st MPJ ROM within normal limits, bilateral.  Dorsally contracted digits absent. No other foot deformities. Integument:   Open lesion present, R. Ulcer plantar R heel, to lateral edges shows non viable tissue   original area shows decreased depth this week, red granular base on edges , no probing currently, no malodor present, no maceration on edges, no undermining. Galilea ulcer edema is seen. No erythema. Interdigital maceration absent to web spaces , Bilateral.  Nails thickened, dystrophic and crumbly, discolored with subungual debris. Fissures absent, Bilateral.   Wound 07/16/18 #1: right heel, plantar surface (Active)   Wound Diabetic 6/10/2019 10:10 AM   Dressing/Treatment Alginate with Ag; Foam 6/17/2019  9:56 AM   Wound Cleansed Rinsed/Irrigated with saline 6/17/2019  9:36 AM   Dressing Change Due 02/19/19 2/13/2019  2:08 PM   Wound Length (cm) 6.5 cm 6/24/2019  9:33 AM   Wound Width (cm) 4.7 cm 6/24/2019  9:33 AM   Wound Depth (cm) 0.7 cm 6/24/2019  9:33 AM   Wound Surface Area (cm^2) 30.55 cm^2 6/24/2019  9:33 AM   Change in Wound Size % (l*w) -845.82 6/24/2019  9:33 AM   Wound Volume (cm^3) 21.38 cm^3 6/24/2019  9:33 AM   Wound Healing % -729 6/24/2019  9:33 AM   Post-Procedure Length (cm) 5 cm 6/10/2019 10:10 AM   Post-Procedure Width (cm) 7.2 cm 6/10/2019 10:10 AM   Post-Procedure Depth (cm) 1.6 cm 6/10/2019 10:10 AM   Post-Procedure Surface Area (cm^2) 36 cm^2 6/10/2019 10:10 AM   Post-Procedure Volume (cm^3) 57.6 cm^3 6/10/2019 10:10 AM   Wound Assessment Yellow;Gray;Pale;Pink 6/17/2019  9:36 AM   Drainage Amount Moderate 6/24/2019  9:33 AM   Drainage Description Serosanguinous 6/24/2019  9:33 AM   Odor None 6/24/2019  9:33 AM   Margins Undefined edges 6/17/2019  9:36 AM   Exposed structure Bone 4/1/2019  8:42 AM   Galilea-wound Assessment Dry 6/24/2019  9:33 AM   Non-staged Wound Description Full thickness 6/10/2019 10:10 AM   [place]%Wound Bed 10 6/17/2019  9:36 AM   Red%Wound Bed 100 6/3/2019  9:49 AM   Yellow%Wound Bed 75 6/17/2019  9:36 AM   Other%Wound Bed 15 6/17/2019  9:36 AM   Number of days: 343         Xray: see report    Asessment: Patient is a 28 y.o. male with:    Diagnosis Orders   1. Skin ulcer of right heel with fat layer exposed ([address]  IA DEBRIDEMENT OPEN WOUND 20 SQ CM<   2. DM type 2 with diabetic peripheral neuropathy (HCC)  IA DEBRIDEMENT OPEN WOUND 20 SQ CM<   3. Osteomyelitis of right foot, unspecified type (HCC)  IA DEBRIDEMENT OPEN WOUND 20 SQ CM<       Ulcer Classification:  R heel 3C R   IDSA  no infection. Plan:  DM foot ed and exam  Active wound management took place 100% non excisional with the use of  curette and tissue nippers. All non viable tissue (including epidermis and/or dermis) and bio burden was removed to promote healing. Bleeding was present. Please see chart for exact measurements, if not documented then size was less than 20 sq cm. Continue offloading as advised  Dressing: silver alginate in deeper area then foam  X rays reviewed with the pt again. Other tx options discussed. Will place of Rx abx, see if wound health progresses. If not then will look into ID consult and possible HbO2 therapy as an option due to OM and non healing ulcer. Orders Placed This Encounter   Medications    ciprofloxacin (CIPRO) 500 MG tablet     Sig: Take 1 tablet by mouth 2 times daily for 10 days     Dispense:  20 tablet     Refill:  0     Patient will continue compression stockings on a regular basis. Any increase in swelling or redness or signs of ulceration will be seen back for further compression wrap therapy. Also advised importance of continued elevation of the leg.   Follow up in 1 week

## 2019-06-25 ENCOUNTER — TELEPHONE (OUTPATIENT)
Dept: WOUND CARE | Age: 34
End: 2019-06-25

## 2019-06-26 LAB
CULTURE: ABNORMAL
DIRECT EXAM: ABNORMAL
Lab: ABNORMAL
SPECIMEN DESCRIPTION: ABNORMAL

## 2019-07-01 ENCOUNTER — OFFICE VISIT (OUTPATIENT)
Dept: WOUND CARE | Age: 34
End: 2019-07-01
Payer: MEDICARE

## 2019-07-01 VITALS — HEART RATE: 82 BPM | DIASTOLIC BLOOD PRESSURE: 74 MMHG | SYSTOLIC BLOOD PRESSURE: 136 MMHG | TEMPERATURE: 97.6 F

## 2019-07-01 DIAGNOSIS — E11.42 DM TYPE 2 WITH DIABETIC PERIPHERAL NEUROPATHY (HCC): ICD-10-CM

## 2019-07-01 DIAGNOSIS — L97.412 SKIN ULCER OF RIGHT HEEL WITH FAT LAYER EXPOSED (HCC): Primary | ICD-10-CM

## 2019-07-01 PROCEDURE — 99999 PR OFFICE/OUTPT VISIT,PROCEDURE ONLY: CPT | Performed by: PODIATRIST

## 2019-07-01 PROCEDURE — 11042 DBRDMT SUBQ TIS 1ST 20SQCM/<: CPT | Performed by: PODIATRIST

## 2019-07-01 NOTE — PROGRESS NOTES
Subjective:    Neil Connelly is a 28 y.o. male who presents for worsening ulcer R heel. Pt mom presents with a lot of questions about tx options for the wound. Pt presents with appropriate dressing in place. Pt currently at nursing home. Pt has no pain. Pt has wore offloading shoe. Pt mom states she found a urine soaked dressing on the foot this past week. Currently denies F/C/N/V. No Known Allergies    Past Medical History:   Diagnosis Date    Diabetes mellitus ([address]     Hyperlipidemia     Prader-Willi syndrome     Sleep apnea        Prior to Admission medications    Medication Sig Start Date End Date Taking?  Authorizing Provider   ciprofloxacin (CIPRO) 500 MG tablet Take 1 tablet by mouth 2 times daily for 10 days 6/24/19 7/4/19 Yes Rojas Hamilton DPM   oxybutynin (DITROPAN-XL) 5 MG extended release tablet Take 5 mg by mouth daily 5/17/19  Yes Historical Provider, MD   furosemide (LASIX) 40 MG tablet Take 40 mg by mouth daily 5/28/19  Yes Historical Provider, MD   escitalopram (LEXAPRO) 10 MG tablet Take 10 mg by mouth daily 5/28/19  Yes Historical Provider, MD   nystatin (MYCOSTATIN) 630040 UNIT/GM powder Apply 100,000 Units topically 4 times daily 5/26/19  Yes Historical Provider, MD   pantoprazole (PROTONIX) 40 MG tablet Take 40 mg by mouth daily 5/28/19  Yes Historical Provider, MD   Probiotic Product (PROBIOTIC-10 PO) Take by mouth daily 5/29/19  Yes Historical Provider, MD   ARIPiprazole (ABILIFY) 10 MG tablet Take 1 tablet by mouth daily 3/8/19  Yes Umm Angeles MD   loperamide (IMODIUM) 2 MG capsule Take 2 mg by mouth 3 times daily as needed for Diarrhea   Yes Historical Provider, MD   acetaminophen (TYLENOL) 500 MG tablet Take 500 mg by mouth every 6 hours as needed for Pain or Fever (q 4-6 hours prn)   Yes Historical Provider, MD   insulin detemir (LEVEMIR FLEXTOUCH) 100 UNIT/ML injection pen Inject 48 Units into the skin 2 times daily  4/10/18  Yes Historical Provider, MD insulin aspart (NOVOLOG) 100 UNIT/ML injection vial Inject into the skin 3 times daily (before meals)   Yes Historical Provider, MD   carBAMazepine (TEGRETOL XR) 200 MG extended release tablet Take 200 mg by mouth 2 times daily 3/1/18  Yes Historical Provider, MD   levothyroxine (SYNTHROID) 50 MCG tablet Take 25 mcg by mouth Daily  1/21/18  Yes Historical Provider, MD   pioglitazone (ACTOS) 15 MG tablet Take 30 mg by mouth daily  1/21/18  Yes Historical Provider, MD   rosuvastatin (CRESTOR) 40 MG tablet Take 40 mg by mouth every evening 1/21/18  Yes Historical Provider, MD   albuterol sulfate  (90 BASE) MCG/ACT inhaler Inhale 2 puffs into the lungs every 6 hours as needed for Wheezing   Yes Historical Provider, MD   CPAP Machine MISC by Does not apply route nightly   Yes Historical Provider, MD   metFORMIN (GLUCOPHAGE) 500 MG tablet Take 1,000 mg by mouth 2 times daily (with meals)  1/21/18  Yes Historical Provider, MD   potassium chloride (KLOR-CON M) 20 MEQ extended release tablet Take 20 mEq by mouth 2 times daily 5/28/19 6/27/19  Historical Provider, MD       Social History     Tobacco Use    Smoking status: Never Smoker    Smokeless tobacco: Never Used   Substance Use Topics    Alcohol use: No     Alcohol/week: 0.0 oz       Review of Systems: All 12 systems reviewed and pertinent positives noted above. Lower Extremity Physical Examination:     Vitals:   Vitals:    07/01/19 0954   BP: 136/74   Pulse: 82   Temp: 97.6 °F (36.4 °C)     General: AAO x 3 in NAD. Vascular: DP and PT pulses palpable 2/4, bilateral.  CFT <3 seconds, bilateral.  Hair growth present to the level of the digits, bilateral.  Edema present, bilateral.  Varicosities present, bilateral. Erythema absent, bilateral. Distal Rubor absent bilateral.  Temperature within normal limits bilateral. Hyperpigmentation present bilateral. Atrophic skin yes.   Neurological: Sensation Impaired to light touch to level of digits, bilateral.

## 2019-07-05 ENCOUNTER — TELEPHONE (OUTPATIENT)
Dept: PODIATRY | Age: 34
End: 2019-07-05

## 2019-07-08 ENCOUNTER — OFFICE VISIT (OUTPATIENT)
Dept: WOUND CARE | Age: 34
End: 2019-07-08
Payer: MEDICARE

## 2019-07-08 ENCOUNTER — TELEPHONE (OUTPATIENT)
Dept: WOUND CARE | Age: 34
End: 2019-07-08

## 2019-07-08 VITALS
SYSTOLIC BLOOD PRESSURE: 118 MMHG | RESPIRATION RATE: 16 BRPM | TEMPERATURE: 97.9 F | DIASTOLIC BLOOD PRESSURE: 60 MMHG | HEART RATE: 94 BPM

## 2019-07-08 DIAGNOSIS — M86.9 OSTEOMYELITIS OF RIGHT FOOT, UNSPECIFIED TYPE (HCC): ICD-10-CM

## 2019-07-08 DIAGNOSIS — L97.412 SKIN ULCER OF RIGHT HEEL WITH FAT LAYER EXPOSED (HCC): Primary | ICD-10-CM

## 2019-07-08 DIAGNOSIS — E11.42 DM TYPE 2 WITH DIABETIC PERIPHERAL NEUROPATHY (HCC): ICD-10-CM

## 2019-07-08 PROCEDURE — 99999 PR OFFICE/OUTPT VISIT,PROCEDURE ONLY: CPT | Performed by: PODIATRIST

## 2019-07-08 PROCEDURE — 11042 DBRDMT SUBQ TIS 1ST 20SQCM/<: CPT | Performed by: PODIATRIST

## 2019-07-08 RX ORDER — SULFAMETHOXAZOLE AND TRIMETHOPRIM 800; 160 MG/1; MG/1
1 TABLET ORAL 2 TIMES DAILY
Qty: 20 TABLET | Refills: 0 | Status: SHIPPED | OUTPATIENT
Start: 2019-07-08 | End: 2019-07-18

## 2019-07-08 ASSESSMENT — PATIENT HEALTH QUESTIONNAIRE - PHQ9: DEPRESSION UNABLE TO ASSESS: FUNCTIONAL CAPACITY MOTIVATION LIMITS ACCURACY

## 2019-07-08 NOTE — PROGRESS NOTES
7/8/2019  9:26 AM   Margins Undefined edges 6/17/2019  9:36 AM   Exposed structure Bone 4/1/2019  8:42 AM   Galilea-wound Assessment Dark edges 7/8/2019  9:26 AM   Non-staged Wound Description Full thickness 7/8/2019  9:26 AM   [place]%Wound Bed 10 6/17/2019  9:36 AM   Red%Wound Bed 100 7/1/2019  9:40 AM   Yellow%Wound Bed 75 6/17/2019  9:36 AM   Other%Wound Bed 15 6/17/2019  9:36 AM   Number of days: 040               Asessment: Patient is a 27 y.o. male with:    Diagnosis Orders   1. Skin ulcer of right heel with fat layer exposed ([address]     2. Osteomyelitis of right foot, unspecified type ([address]     3. DM type 2 with diabetic peripheral neuropathy (HCC)         Ulcer Classification:  R heel 3C R   IDSA  no infection. Plan:  DM foot ed and exam  Sharp excisional debridement took place of the ulceration, sub-cutaneous in nature,  curette and tissue nippers were used for debridement. All non viable and necrotic tissue was removed to promote healing. Bleeding was present. See wound assessment note for post debridement measurements. Continue offloading as advised. Due to worsening changes of the ulcer, pt will be made NWB R foot with bedside bathroom privlleges. Dressing: silver alginate in deeper area then foam  Pt needs absolutely NO PT activity on R foot. Pt states they have had him still walking on this R foot. Nursing home administrator will be contacted regarding this by our nurse. Advised pt to get Heel lift offloading boot to wear at all times to relieve pressure from heel. Pt was educated on importance of this and how this can facilitate healing. Information was given on how to order this device.   Orders Placed This Encounter   Medications    sulfamethoxazole-trimethoprim (BACTRIM DS) 800-160 MG per tablet     Sig: Take 1 tablet by mouth 2 times daily for 10 days     Dispense:  20 tablet     Refill:  0       If no further improvement with more aggressive offloading then pt will be sent for HBO2

## 2019-07-09 ENCOUNTER — TELEPHONE (OUTPATIENT)
Dept: SURGERY | Age: 34
End: 2019-07-09

## 2019-07-10 ENCOUNTER — TELEPHONE (OUTPATIENT)
Dept: SURGERY | Age: 34
End: 2019-07-10

## 2019-07-11 NOTE — TELEPHONE ENCOUNTER
Call to [facility], spoke with unit manager, Wicho Garza. Wicho Garza clarifies that pt refuses to elevate leg while in bed, refuses to be transferred to recliner, and will refuse dressing changes even when soaked with urine. Wicho Garza states that it is not that facility unable to elevate pt's leg, but that it is pt's refusal to have leg elevated.  Wicho Garza states that yesterday's call from [facility] staff was to notify wound clinic of pt non-compliance, per writer's request.

## 2019-07-15 ENCOUNTER — OFFICE VISIT (OUTPATIENT)
Dept: WOUND CARE | Age: 34
End: 2019-07-15
Payer: MEDICARE

## 2019-07-15 ENCOUNTER — TELEPHONE (OUTPATIENT)
Dept: WOUND CARE | Age: 34
End: 2019-07-15

## 2019-07-15 VITALS — HEART RATE: 72 BPM | SYSTOLIC BLOOD PRESSURE: 108 MMHG | DIASTOLIC BLOOD PRESSURE: 74 MMHG | TEMPERATURE: 97.5 F

## 2019-07-15 DIAGNOSIS — E11.42 DM TYPE 2 WITH DIABETIC PERIPHERAL NEUROPATHY (HCC): ICD-10-CM

## 2019-07-15 DIAGNOSIS — L97.412 SKIN ULCER OF RIGHT HEEL WITH FAT LAYER EXPOSED (HCC): Primary | ICD-10-CM

## 2019-07-15 DIAGNOSIS — M86.9 OSTEOMYELITIS OF RIGHT FOOT, UNSPECIFIED TYPE (HCC): ICD-10-CM

## 2019-07-15 PROCEDURE — 99213 OFFICE O/P EST LOW 20 MIN: CPT | Performed by: PODIATRIST

## 2019-07-15 PROCEDURE — 11042 DBRDMT SUBQ TIS 1ST 20SQCM/<: CPT | Performed by: PODIATRIST

## 2019-07-15 PROCEDURE — G8427 DOCREV CUR MEDS BY ELIG CLIN: HCPCS | Performed by: PODIATRIST

## 2019-07-15 PROCEDURE — G8417 CALC BMI ABV UP PARAM F/U: HCPCS | Performed by: PODIATRIST

## 2019-07-15 PROCEDURE — 3044F HG A1C LEVEL LT 7.0%: CPT | Performed by: PODIATRIST

## 2019-07-15 PROCEDURE — 1036F TOBACCO NON-USER: CPT | Performed by: PODIATRIST

## 2019-07-15 PROCEDURE — 2022F DILAT RTA XM EVC RTNOPTHY: CPT | Performed by: PODIATRIST

## 2019-07-15 NOTE — PROGRESS NOTES
Subjective:    Jose Juan Quinones is a 27 y.o. male who presents for persisting ulcer R heel. Pt states he has not been on foot for PT the past week. pt presents with offloading boot . No further signbs of infx seen at home. Pt presents with appropriate dressing in place. Pt currently at nursing home. Pt has no pain. Currently denies F/C/N/V. No Known Allergies    Past Medical History:   Diagnosis Date    Diabetes mellitus ([facility] [address]     Hyperlipidemia     Prader-Willi syndrome     Sleep apnea        Prior to Admission medications    Medication Sig Start Date End Date Taking?  Authorizing Provider   oxybutynin (DITROPAN-XL) 5 MG extended release tablet Take 5 mg by mouth daily 5/17/19  Yes Historical Provider, MD   furosemide (LASIX) 40 MG tablet Take 40 mg by mouth daily 5/28/19  Yes Historical Provider, MD   escitalopram (LEXAPRO) 10 MG tablet Take 10 mg by mouth daily 5/28/19  Yes Historical Provider, MD   nystatin (MYCOSTATIN) 996291 UNIT/GM powder Apply 100,000 Units topically 4 times daily 5/26/19  Yes Historical Provider, MD   pantoprazole (PROTONIX) 40 MG tablet Take 40 mg by mouth daily 5/28/19  Yes Historical Provider, MD   Probiotic Product (PROBIOTIC-10 PO) Take by mouth daily 5/29/19  Yes Historical Provider, MD   ARIPiprazole (ABILIFY) 10 MG tablet Take 1 tablet by mouth daily 3/8/19  Yes Tabby Sifuentes MD   acetaminophen (TYLENOL) 500 MG tablet Take 500 mg by mouth every 6 hours as needed for Pain or Fever (q 4-6 hours prn)   Yes Historical Provider, MD   insulin detemir (LEVEMIR FLEXTOUCH) 100 UNIT/ML injection pen Inject 48 Units into the skin 2 times daily  4/10/18  Yes Historical Provider, MD   insulin aspart (NOVOLOG) 100 UNIT/ML injection vial Inject into the skin 3 times daily (before meals)   Yes Historical Provider, MD   levothyroxine (SYNTHROID) 50 MCG tablet Take 25 mcg by mouth Daily  1/21/18  Yes Historical Provider, MD   pioglitazone (ACTOS) 15 MG tablet Take 30 mg by mouth daily 1/21/18  Yes Historical Provider, MD   rosuvastatin (CRESTOR) 40 MG tablet Take 40 mg by mouth every evening 1/21/18  Yes Historical Provider, MD   albuterol sulfate  (90 BASE) MCG/ACT inhaler Inhale 2 puffs into the lungs every 6 hours as needed for Wheezing   Yes Historical Provider, MD   metFORMIN (GLUCOPHAGE) 500 MG tablet Take 1,000 mg by mouth 2 times daily (with meals)  1/21/18  Yes Historical Provider, MD   sulfamethoxazole-trimethoprim (BACTRIM DS) 800-160 MG per tablet Take 1 tablet by mouth 2 times daily for 10 days 7/8/19 7/18/19  Kari Paz DPM   potassium chloride (KLOR-CON M) 20 MEQ extended release tablet Take 20 mEq by mouth 2 times daily 5/28/19 6/27/19  Historical Provider, MD   loperamide (IMODIUM) 2 MG capsule Take 2 mg by mouth 3 times daily as needed for Diarrhea    Historical Provider, MD   carBAMazepine (TEGRETOL XR) 200 MG extended release tablet Take 200 mg by mouth 2 times daily 3/1/18   Historical Provider, MD   CPAP Machine MISC by Does not apply route nightly    Historical Provider, MD       Social History     Tobacco Use    Smoking status: Never Smoker    Smokeless tobacco: Never Used   Substance Use Topics    Alcohol use: No     Alcohol/week: 0.0 standard drinks       Review of Systems: All 12 systems reviewed and pertinent positives noted above. Lower Extremity Physical Examination:     Vitals:   Vitals:    07/15/19 0945   BP: 108/74   Pulse: 72   Temp: 97.5 °F (36.4 °C)     General: AAO x 3 in NAD. Vascular: DP and PT pulses palpable 2/4, bilateral.  CFT <3 seconds, bilateral.  Hair growth present to the level of the digits, bilateral.  Edema present, bilateral.  Varicosities present, bilateral. Erythema absent, bilateral. Distal Rubor absent bilateral.  Temperature within normal limits bilateral. Hyperpigmentation present bilateral. Atrophic skin yes.   Neurological: Sensation Impaired to light touch to level of digits, bilateral.  Protective sensation intact  10/10 sites via 5.07/10g [place]-Tyrese Monofilament, bilateral.  negative Tinel's, bilateral.  negative Valleix sign, bilateral.  Vibratory abnormal  bilateral.  Reflexes Decreased bilateral.  Paresthesias positive. Dysthesias negative. Sharp/dull intact bilateral.  Musculoskeletal: Muscle strength 5/5, bilateral.  Pain absent upon palpation bilateral. Normal medial longitudinal arch, bilateral.  Ankle ROM within normal limits,bilateral.  1st MPJ ROM within normal limits, bilateral.  Dorsally contracted digits absent. No other foot deformities. Integument:   Open lesion present, R. Ulcer plantar R heel, to lateral edges shows non viable tissue. No further breakdown lateral edge, that area has resolved somewhat from last week. minimal red granular base, a lot of fibrin down into sub q tissue. , no probing currently, no malodor present, no maceration on edges, no undermining. Galilea ulcer edema is seen. No erythema. Interdigital maceration absent to web spaces , Bilateral.  Nails thickened, dystrophic and crumbly, discolored with subungual debris.   Fissures absent, Bilateral.   Wound 07/16/18 #1: right heel, plantar surface (Active)   Wound Diabetic 7/8/2019  9:26 AM   Dressing/Treatment Alginate with Ag 7/8/2019  9:26 AM   Wound Cleansed Rinsed/Irrigated with saline 6/17/2019  9:36 AM   Dressing Change Due 02/19/19 2/13/2019  2:08 PM   Wound Length (cm) 8 cm 7/15/2019  9:58 AM   Wound Width (cm) 8.6 cm 7/15/2019  9:58 AM   Wound Depth (cm) 1.8 cm 7/15/2019  9:58 AM   Wound Surface Area (cm^2) 68.8 cm^2 7/15/2019  9:58 AM   Change in Wound Size % (l*w) -2030.03 7/15/2019  9:58 AM   Wound Volume (cm^3) 123.84 cm^3 7/15/2019  9:58 AM   Wound Healing % -4700 7/15/2019  9:58 AM   Post-Procedure Length (cm) 5.7 cm 7/8/2019  9:26 AM   Post-Procedure Width (cm) 11.5 cm 7/8/2019  9:26 AM   Post-Procedure Depth (cm) 1.8 cm 7/8/2019  9:26 AM   Post-Procedure Surface Area (cm^2) 65.55 cm^2 7/8/2019  9:26 AM relieve pressure from heel. Pt was educated on importance of this and how this can facilitate healing. Information was given on how to order this device.   Orders Placed This Encounter   Procedures    Amb External Referral To Wound Clinic     Referral Priority:   Routine     Referral Type:   Consult for Advice and Opinion     Referral Reason:   Specialty Services Required     Requested Specialty:   Wound Care     Number of Visits Requested:   1    NH DEBRIDEMENT, SKIN, SUB-Q TISSUE,=<20 SQ CM   consult for hyperbaric O2 therapy  Follow up in 1 week

## 2019-07-16 ENCOUNTER — TELEPHONE (OUTPATIENT)
Dept: WOUND CARE | Age: 34
End: 2019-07-16

## 2019-07-22 ENCOUNTER — TELEPHONE (OUTPATIENT)
Dept: WOUND CARE | Age: 34
End: 2019-07-22

## 2019-07-22 ENCOUNTER — OFFICE VISIT (OUTPATIENT)
Dept: WOUND CARE | Age: 34
End: 2019-07-22
Payer: MEDICARE

## 2019-07-22 VITALS — HEART RATE: 88 BPM | SYSTOLIC BLOOD PRESSURE: 132 MMHG | DIASTOLIC BLOOD PRESSURE: 80 MMHG | TEMPERATURE: 98.9 F

## 2019-07-22 DIAGNOSIS — L97.412 SKIN ULCER OF RIGHT HEEL WITH FAT LAYER EXPOSED (HCC): Primary | ICD-10-CM

## 2019-07-22 DIAGNOSIS — E11.42 DM TYPE 2 WITH DIABETIC PERIPHERAL NEUROPATHY (HCC): ICD-10-CM

## 2019-07-22 DIAGNOSIS — M86.9 OSTEOMYELITIS OF RIGHT FOOT, UNSPECIFIED TYPE (HCC): ICD-10-CM

## 2019-07-22 PROCEDURE — 11042 DBRDMT SUBQ TIS 1ST 20SQCM/<: CPT | Performed by: PODIATRIST

## 2019-07-22 PROCEDURE — 99999 PR OFFICE/OUTPT VISIT,PROCEDURE ONLY: CPT | Performed by: PODIATRIST

## 2019-07-22 PROCEDURE — 11045 DBRDMT SUBQ TISS EACH ADDL: CPT | Performed by: PODIATRIST

## 2019-07-22 NOTE — PATIENT INSTRUCTIONS
Continue with silver alginate dressing with optilock to right heel wound, change dressing daily  Patient is to be no weight bearing  Continue to wear heel boot to right heel  Follow up with Dr Indio Virgen in 1 week

## 2019-07-26 ENCOUNTER — TELEPHONE (OUTPATIENT)
Dept: WOUND CARE | Age: 34
End: 2019-07-26

## 2019-07-29 ENCOUNTER — HOSPITAL ENCOUNTER (OUTPATIENT)
Dept: LAB | Age: 34
Discharge: HOME | End: 2019-07-29
Payer: COMMERCIAL

## 2019-07-29 ENCOUNTER — OFFICE VISIT (OUTPATIENT)
Dept: WOUND CARE | Age: 34
End: 2019-07-29
Payer: MEDICARE

## 2019-07-29 ENCOUNTER — HOSPITAL ENCOUNTER (OUTPATIENT)
Dept: GENERAL RADIOLOGY | Age: 34
Discharge: HOME | End: 2019-07-31
Payer: COMMERCIAL

## 2019-07-29 VITALS
TEMPERATURE: 97.7 F | DIASTOLIC BLOOD PRESSURE: 60 MMHG | HEART RATE: 78 BPM | RESPIRATION RATE: 18 BRPM | SYSTOLIC BLOOD PRESSURE: 112 MMHG

## 2019-07-29 DIAGNOSIS — M86.9 OSTEOMYELITIS OF RIGHT FOOT, UNSPECIFIED TYPE (HCC): ICD-10-CM

## 2019-07-29 DIAGNOSIS — L97.412 SKIN ULCER OF RIGHT HEEL WITH FAT LAYER EXPOSED (HCC): ICD-10-CM

## 2019-07-29 DIAGNOSIS — E11.42 DM TYPE 2 WITH DIABETIC PERIPHERAL NEUROPATHY (HCC): ICD-10-CM

## 2019-07-29 DIAGNOSIS — L89.622 PRESSURE INJURY OF LEFT HEEL, STAGE 2 (HCC): Primary | ICD-10-CM

## 2019-07-29 DIAGNOSIS — L89.622 PRESSURE INJURY OF LEFT HEEL, STAGE 2 (HCC): ICD-10-CM

## 2019-07-29 LAB
ALBUMIN SERPL-MCNC: 4 G/DL (ref 3.5–5.2)
ALBUMIN/GLOBULIN RATIO: 1.2 (ref 1–2.5)
ALP BLD-CCNC: 100 U/L (ref 40–129)
ALT SERPL-CCNC: 35 U/L (ref 5–41)
ANION GAP SERPL CALCULATED.3IONS-SCNC: 13 MMOL/L (ref 9–17)
AST SERPL-CCNC: 29 U/L
BILIRUB SERPL-MCNC: 0.31 MG/DL (ref 0.3–1.2)
BUN BLDV-MCNC: 16 MG/DL (ref 6–20)
BUN/CREAT BLD: 28 (ref 9–20)
CALCIUM SERPL-MCNC: 9.5 MG/DL (ref 8.6–10.4)
CHLORIDE BLD-SCNC: 95 MMOL/L (ref 98–107)
CO2: 33 MMOL/L (ref 20–31)
CREAT SERPL-MCNC: 0.58 MG/DL (ref 0.7–1.2)
GFR AFRICAN AMERICAN: >60 ML/MIN
GFR NON-AFRICAN AMERICAN: >60 ML/MIN
GFR SERPL CREATININE-BSD FRML MDRD: ABNORMAL ML/MIN/{1.73_M2}
GFR SERPL CREATININE-BSD FRML MDRD: ABNORMAL ML/MIN/{1.73_M2}
GLUCOSE BLD-MCNC: 250 MG/DL (ref 70–99)
HCT VFR BLD CALC: 47 % (ref 41–53)
HEMOGLOBIN: 15.2 G/DL (ref 13.5–17.5)
MCH RBC QN AUTO: 29.1 PG (ref 26–34)
MCHC RBC AUTO-ENTMCNC: 32.4 G/DL (ref 31–37)
MCV RBC AUTO: 89.8 FL (ref 80–100)
NRBC AUTOMATED: NORMAL PER 100 WBC
PDW BLD-RTO: 14.3 % (ref 11–14.5)
PLATELET # BLD: 226 K/UL (ref 140–450)
PMV BLD AUTO: 9.1 FL (ref 6–12)
POTASSIUM SERPL-SCNC: 4.2 MMOL/L (ref 3.7–5.3)
RBC # BLD: 5.23 M/UL (ref 4.5–5.9)
SEDIMENTATION RATE, ERYTHROCYTE: 8 MM (ref 0–15)
SODIUM BLD-SCNC: 141 MMOL/L (ref 135–144)
TOTAL PROTEIN: 7.4 G/DL (ref 6.4–8.3)
WBC # BLD: 8.5 K/UL (ref 3.5–11)

## 2019-07-29 PROCEDURE — 80053 COMPREHEN METABOLIC PANEL: CPT

## 2019-07-29 PROCEDURE — 85027 COMPLETE CBC AUTOMATED: CPT

## 2019-07-29 PROCEDURE — 73630 X-RAY EXAM OF FOOT: CPT

## 2019-07-29 PROCEDURE — 99213 OFFICE O/P EST LOW 20 MIN: CPT | Performed by: PODIATRIST

## 2019-07-29 PROCEDURE — G8427 DOCREV CUR MEDS BY ELIG CLIN: HCPCS | Performed by: PODIATRIST

## 2019-07-29 PROCEDURE — 11042 DBRDMT SUBQ TIS 1ST 20SQCM/<: CPT | Performed by: PODIATRIST

## 2019-07-29 PROCEDURE — G8417 CALC BMI ABV UP PARAM F/U: HCPCS | Performed by: PODIATRIST

## 2019-07-29 PROCEDURE — 3044F HG A1C LEVEL LT 7.0%: CPT | Performed by: PODIATRIST

## 2019-07-29 PROCEDURE — 85651 RBC SED RATE NONAUTOMATED: CPT

## 2019-07-29 PROCEDURE — 2022F DILAT RTA XM EVC RTNOPTHY: CPT | Performed by: PODIATRIST

## 2019-07-29 PROCEDURE — 1036F TOBACCO NON-USER: CPT | Performed by: PODIATRIST

## 2019-07-29 PROCEDURE — 36415 COLL VENOUS BLD VENIPUNCTURE: CPT

## 2019-07-29 NOTE — PROGRESS NOTES
[place]-Tyrese Monofilament, bilateral.  negative Tinel's, bilateral.  negative Valleix sign, bilateral.  Vibratory abnormal  bilateral.  Reflexes Decreased bilateral.  Paresthesias positive. Dysthesias negative. Sharp/dull intact bilateral.  Musculoskeletal: Muscle strength 5/5, bilateral.  Pain absent upon palpation bilateral. Normal medial longitudinal arch, bilateral.  Ankle ROM within normal limits,bilateral.  1st MPJ ROM within normal limits, bilateral.  Dorsally contracted digits absent. No other foot deformities. Integument:   Open lesion present, R. Ulcer plantar R heel, to lateral edges shows non viable tissue. increased red granular base, positive fibrin down into sub q tissue. , probing is deeper this week,  no malodor present, no maceration on edges, no undermining. [place] ulcer edema is seen. No erythema. St 2 pressure ulcer posterior lateral L heel, fluid filled blister currently, non infected. Interdigital maceration absent to web spaces , Bilateral.  Nails thickened, dystrophic and crumbly, discolored with subungual debris.   Fissures absent, Bilateral.  Wound 07/16/18 #1: right heel, plantar surface (Active)   Wound Diabetic 7/29/2019 11:05 AM   Dressing/Treatment Alginate with Ag 7/29/2019 11:05 AM   Wound Cleansed Rinsed/Irrigated with saline 6/17/2019  9:36 AM   Dressing Change Due 02/19/19 2/13/2019  2:08 PM   Wound Length (cm) 5.2 cm 7/29/2019 11:05 AM   Wound Width (cm) 9.3 cm 7/29/2019 11:05 AM   Wound Depth (cm) 1.8 cm 7/29/2019 11:05 AM   Wound Surface Area (cm^2) 48.36 cm^2 7/29/2019 11:05 AM   Change in Wound Size % (l*w) -1397.21 7/29/2019 11:05 AM   Wound Volume (cm^3) 87.05 cm^3 7/29/2019 11:05 AM   Wound Healing % -3274 7/29/2019 11:05 AM   Post-Procedure Length (cm) 5.2 cm 7/29/2019 11:05 AM   Post-Procedure Width (cm) 9.3 cm 7/29/2019 11:05 AM   Post-Procedure Depth (cm) 2 cm 7/29/2019 11:05 AM   Post-Procedure Surface Area (cm^2) 48.36 cm^2 7/29/2019 11:05 AM Post-Procedure Volume (cm^3) 96.72 cm^3 7/29/2019 11:05 AM   Wound Assessment Red;Dusky;Drainage 7/29/2019 11:05 AM   Drainage Amount Large 7/29/2019 11:05 AM   Drainage Description Purulent 7/29/2019 11:05 AM   Odor None 7/29/2019 11:05 AM   Margins Undefined edges 7/29/2019 11:05 AM   Exposed structure Bone 4/1/2019  8:42 AM   [place]ile[place]-wound Assessment Maceration 7/29/2019 11:05 AM   Non-staged Wound Description Full thickness 7/29/2019 11:05 AM   [place]%Wound Bed 10 6/17/2019  9:36 AM   Red%Wound Bed 100 7/22/2019 10:13 AM   Yellow%Wound Bed 75 6/17/2019  9:36 AM   Black%Wound Bed 5 7/8/2019  9:26 AM   Other%Wound Bed 15 6/17/2019  9:36 AM   Number of days: 378       Wound 07/29/19 #2: left lateral heel (Active)   Dressing/Treatment Foam 7/29/2019 11:05 AM   Wound Length (cm) 4.5 cm 7/29/2019 11:05 AM   Wound Width (cm) 5 cm 7/29/2019 11:05 AM   Wound Surface Area (cm^2) 22.5 cm^2 7/29/2019 11:05 AM   Number of days: 0       Asessment: Patient is a 27 y.o. male with:    Diagnosis Orders   1. Pressure injury of left heel, stage 2  CBC    Comprehensive Metabolic Panel    Sedimentation Rate   2. Skin ulcer of right heel with fat layer exposed ([address]  OH DEBRIDEMENT, SKIN, SUB-Q TISSUE,=<20 SQ CM    CBC    Comprehensive Metabolic Panel    Sedimentation Rate   3. DM type 2 with diabetic peripheral neuropathy (HCC)  OH DEBRIDEMENT, SKIN, SUB-Q TISSUE,=<20 SQ CM    XR FOOT RIGHT (MIN 3 VIEWS)    CBC    Comprehensive Metabolic Panel    Sedimentation Rate   4. Osteomyelitis of right foot, unspecified type (HCC)  CBC    Comprehensive Metabolic Panel    Sedimentation Rate       Ulcer Classification:  R heel 3C R   IDSA  no infection. Plan:  DM foot ed and exam  Sharp excisional debridement took place of the ulceration, sub-cutaneous in nature,  curette and tissue nippers were used for debridement. All non viable and necrotic tissue was removed to promote healing. Bleeding was present.   See wound assessment note for post debridement measurements. Orders Placed This Encounter   Procedures    XR FOOT RIGHT (MIN 3 VIEWS)     Standing Status:   Future     Standing Expiration Date:   7/29/2020     Scheduling Instructions:      WB    CBC     Standing Status:   Future     Standing Expiration Date:   7/28/2020    Comprehensive Metabolic Panel     Standing Status:   Future     Standing Expiration Date:   7/28/2020    Sedimentation Rate     Standing Status:   Future     Standing Expiration Date:   7/28/2020    AR DEBRIDEMENT, SKIN, SUB-Q TISSUE,=<20 SQ CM   further recs ost repeat labs and xrays  Pt goes back next week for hyperbaric O2 consult  Continue offloading as advised. continue NWB R foot with bedside bathroom privlleges. Dressing: silver alginate in deeper area then foam R, foam L heel  Continue offloading boot to wear at all times to relieve pressure from heel. Pt was educated on importance of this and how this can facilitate healing. Information was given on how to order this device.   Follow up in 1 week

## 2019-07-29 NOTE — PATIENT INSTRUCTIONS
Continue silicone foam dressing to left heel. Change foam 2x/week and as needed for soiling or dislodged. Continue silver alginate dressing to right heel, cover with super-absorbent dressing and secure with roll gauze. Change dressing every day and as needed for soiling or dislodged. Labs were ordered today. X Ray of right foot was ordered today. Heel-lift boots to both feet at all times. Continue non-weight bearing status to right foot. SIGNS OF INFECTION  - Redness, swelling, skin hot  - Wound bed turns black or stringy yellow  - Foul odor  - Increased drainage or pus  - Increased pain  - Fever greater than 100F    CALL YOUR DOCTOR OR SEEK MEDICAL ATTENTION IF SIGNS OF INFECTION. DO NOT WAIT UNTIL YOUR NEXT APPOINTMENT    Call the Wound Care Nurse with any other questions or concerns- [phone]. Follow up in 1 week with Dr. Larry Huffman.

## 2019-08-01 ENCOUNTER — TELEPHONE (OUTPATIENT)
Dept: PODIATRY | Age: 34
End: 2019-08-01

## 2019-08-01 NOTE — TELEPHONE ENCOUNTER
[facility] called requesting to speak with one of Dr. Nick Alexander regarding patients weight bearing status- she wants to know if patient can do \"toe-touch transfers\". Please call [facility] at [phone].

## 2019-08-02 ENCOUNTER — TELEPHONE (OUTPATIENT)
Dept: WOUND CARE | Age: 34
End: 2019-08-02

## 2019-08-05 ENCOUNTER — OFFICE VISIT (OUTPATIENT)
Dept: WOUND CARE | Age: 34
End: 2019-08-05
Payer: MEDICARE

## 2019-08-05 VITALS — DIASTOLIC BLOOD PRESSURE: 64 MMHG | SYSTOLIC BLOOD PRESSURE: 120 MMHG | TEMPERATURE: 97.6 F | HEART RATE: 88 BPM

## 2019-08-05 DIAGNOSIS — L89.623 PRESSURE INJURY OF LEFT HEEL, STAGE 3 (HCC): ICD-10-CM

## 2019-08-05 DIAGNOSIS — L97.412 SKIN ULCER OF RIGHT HEEL WITH FAT LAYER EXPOSED (HCC): Primary | ICD-10-CM

## 2019-08-05 DIAGNOSIS — E11.42 DM TYPE 2 WITH DIABETIC PERIPHERAL NEUROPATHY (HCC): ICD-10-CM

## 2019-08-05 PROCEDURE — 11042 DBRDMT SUBQ TIS 1ST 20SQCM/<: CPT | Performed by: PODIATRIST

## 2019-08-05 PROCEDURE — 99999 PR OFFICE/OUTPT VISIT,PROCEDURE ONLY: CPT | Performed by: PODIATRIST

## 2019-08-05 PROCEDURE — 97597 DBRDMT OPN WND 1ST 20 CM/<: CPT | Performed by: PODIATRIST

## 2019-08-05 PROCEDURE — 11045 DBRDMT SUBQ TISS EACH ADDL: CPT | Performed by: PODIATRIST

## 2019-08-06 ENCOUNTER — TELEPHONE (OUTPATIENT)
Dept: WOUND CARE | Age: 34
End: 2019-08-06

## 2019-08-09 ENCOUNTER — TELEPHONE (OUTPATIENT)
Dept: WOUND CARE | Age: 34
End: 2019-08-09

## 2019-08-12 ENCOUNTER — OFFICE VISIT (OUTPATIENT)
Dept: WOUND CARE | Age: 34
End: 2019-08-12
Payer: MEDICARE

## 2019-08-12 VITALS — HEART RATE: 62 BPM

## 2019-08-12 DIAGNOSIS — L97.412 SKIN ULCER OF RIGHT HEEL WITH FAT LAYER EXPOSED (HCC): Primary | ICD-10-CM

## 2019-08-12 DIAGNOSIS — E11.42 DM TYPE 2 WITH DIABETIC PERIPHERAL NEUROPATHY (HCC): ICD-10-CM

## 2019-08-12 DIAGNOSIS — L89.623 PRESSURE INJURY OF LEFT HEEL, STAGE 3 (HCC): ICD-10-CM

## 2019-08-12 PROCEDURE — 99999 PR OFFICE/OUTPT VISIT,PROCEDURE ONLY: CPT | Performed by: PODIATRIST

## 2019-08-12 PROCEDURE — 11042 DBRDMT SUBQ TIS 1ST 20SQCM/<: CPT | Performed by: PODIATRIST

## 2019-08-12 PROCEDURE — 97597 DBRDMT OPN WND 1ST 20 CM/<: CPT | Performed by: PODIATRIST

## 2019-08-12 NOTE — PATIENT INSTRUCTIONS
Continue with silver alginate dressings to both heels, change dressings daily  Continue off loading boots  Follow up in 1 week

## 2019-08-12 NOTE — PROGRESS NOTES
8/5/2019 10:32 AM   Drainage Amount Large 8/12/2019  9:19 AM   Drainage Description Serosanguinous 8/12/2019  9:19 AM   Odor None 8/12/2019  9:19 AM   Margins Undefined edges 8/5/2019 10:32 AM   Exposed structure Bone 4/1/2019  8:42 AM   Galilea-wound Assessment Maceration 8/5/2019 10:32 AM   Non-staged Wound Description Full thickness 8/5/2019 10:32 AM   [place]%Wound Bed 10 6/17/2019  9:36 AM   Red%Wound Bed 100 8/12/2019  9:19 AM   Yellow%Wound Bed 75 6/17/2019  9:36 AM   Black%Wound Bed 5 7/8/2019  9:26 AM   Other%Wound Bed 15 6/17/2019  9:36 AM   Number of days: 392       Wound 07/29/19 #2: left lateral heel (Active)   Dressing/Treatment Alginate with Ag 8/12/2019  9:19 AM   Wound Length (cm) 4 cm 8/12/2019  9:19 AM   Wound Width (cm) 2 cm 8/12/2019  9:19 AM   Wound Depth (cm) 0.4 cm 8/12/2019  9:19 AM   Wound Surface Area (cm^2) 8 cm^2 8/12/2019  9:19 AM   Change in Wound Size % (l*w) 64.44 8/12/2019  9:19 AM   Wound Volume (cm^3) 3.2 cm^3 8/12/2019  9:19 AM   Wound Healing % -16 8/12/2019  9:19 AM   Post-Procedure Length (cm) 5 cm 8/5/2019 10:32 AM   Post-Procedure Width (cm) 5.5 cm 8/5/2019 10:32 AM   Post-Procedure Depth (cm) 0.3 cm 8/5/2019 10:32 AM   Post-Procedure Surface Area (cm^2) 27.5 cm^2 8/5/2019 10:32 AM   Post-Procedure Volume (cm^3) 8.25 cm^3 8/5/2019 10:32 AM   Wound Assessment Dusky 8/5/2019 10:32 AM   Drainage Amount Small 8/12/2019  9:19 AM   Drainage Description Serous 8/12/2019  9:19 AM   Odor None 8/5/2019 10:32 AM   Margins Defined edges 8/5/2019 10:32 AM   Non-staged Wound Description Full thickness 8/5/2019 10:32 AM   Red%Wound Bed 100 8/12/2019  9:19 AM   Number of days: 13                 Asessment: Patient is a 27 y.o. male with:    Diagnosis Orders   1. Skin ulcer of right heel with fat layer exposed ([address]     2. Pressure injury of left heel, stage 3 ([address]     3.  DM type 2 with diabetic peripheral neuropathy (HCC)         Ulcer Classification:  R heel 3C R   IDSA  no

## 2019-08-13 DIAGNOSIS — L97.921 SKIN ULCER OF LEFT LOWER LEG, LIMITED TO BREAKDOWN OF SKIN (HCC): ICD-10-CM

## 2019-08-13 DIAGNOSIS — L97.414 SKIN ULCER OF RIGHT HEEL WITH NECROSIS OF BONE (HCC): Primary | ICD-10-CM

## 2019-08-14 ENCOUNTER — TELEPHONE (OUTPATIENT)
Dept: WOUND CARE | Age: 34
End: 2019-08-14

## 2019-08-19 ENCOUNTER — TELEPHONE (OUTPATIENT)
Dept: WOUND CARE | Age: 34
End: 2019-08-19

## 2019-08-19 ENCOUNTER — OFFICE VISIT (OUTPATIENT)
Dept: WOUND CARE | Age: 34
End: 2019-08-19
Payer: MEDICARE

## 2019-08-19 VITALS
DIASTOLIC BLOOD PRESSURE: 68 MMHG | SYSTOLIC BLOOD PRESSURE: 124 MMHG | HEART RATE: 88 BPM | RESPIRATION RATE: 20 BRPM | TEMPERATURE: 97.7 F

## 2019-08-19 DIAGNOSIS — L97.412 SKIN ULCER OF RIGHT HEEL WITH FAT LAYER EXPOSED (HCC): Primary | ICD-10-CM

## 2019-08-19 DIAGNOSIS — L89.623 PRESSURE INJURY OF LEFT HEEL, STAGE 3 (HCC): ICD-10-CM

## 2019-08-19 DIAGNOSIS — E11.42 DM TYPE 2 WITH DIABETIC PERIPHERAL NEUROPATHY (HCC): ICD-10-CM

## 2019-08-19 PROCEDURE — 97597 DBRDMT OPN WND 1ST 20 CM/<: CPT | Performed by: PODIATRIST

## 2019-08-19 PROCEDURE — 99999 PR OFFICE/OUTPT VISIT,PROCEDURE ONLY: CPT | Performed by: PODIATRIST

## 2019-08-19 PROCEDURE — 11042 DBRDMT SUBQ TIS 1ST 20SQCM/<: CPT | Performed by: PODIATRIST

## 2019-08-19 NOTE — PROGRESS NOTES
11:05 AM   Wound Assessment Red;Dusky 8/5/2019 10:32 AM   Drainage Amount Large 8/19/2019 11:09 AM   Drainage Description Serosanguinous 8/19/2019 11:09 AM   Odor None 8/19/2019 11:09 AM   Margins Undefined edges 8/5/2019 10:32 AM   Exposed structure Bone 4/1/2019  8:42 AM   Galilea-wound Assessment Maceration 8/5/2019 10:32 AM   Non-staged Wound Description Full thickness 8/5/2019 10:32 AM   [place]%Wound Bed 10 6/17/2019  9:36 AM   Red%Wound Bed 100 8/19/2019 11:09 AM   Yellow%Wound Bed 75 6/17/2019  9:36 AM   Black%Wound Bed 5 7/8/2019  9:26 AM   Other%Wound Bed 15 6/17/2019  9:36 AM   Number of days: 399       Wound 07/29/19 #2: left lateral heel (Active)   Dressing/Treatment Alginate with Ag 8/19/2019 11:09 AM   Wound Cleansed Rinsed/Irrigated with saline 8/19/2019 11:09 AM   Wound Length (cm) 3.8 cm 8/19/2019 11:09 AM   Wound Width (cm) 2 cm 8/19/2019 11:09 AM   Wound Depth (cm) 0.1 cm 8/19/2019 11:09 AM   Wound Surface Area (cm^2) 7.6 cm^2 8/19/2019 11:09 AM   Change in Wound Size % (l*w) 66.22 8/19/2019 11:09 AM   Wound Volume (cm^3) 0.76 cm^3 8/19/2019 11:09 AM   Wound Healing % 72 8/19/2019 11:09 AM   Post-Procedure Length (cm) 5 cm 8/5/2019 10:32 AM   Post-Procedure Width (cm) 5.5 cm 8/5/2019 10:32 AM   Post-Procedure Depth (cm) 0.3 cm 8/5/2019 10:32 AM   Post-Procedure Surface Area (cm^2) 27.5 cm^2 8/5/2019 10:32 AM   Post-Procedure Volume (cm^3) 8.25 cm^3 8/5/2019 10:32 AM   Wound Assessment Dusky 8/5/2019 10:32 AM   Drainage Amount Small 8/19/2019 11:09 AM   Drainage Description Serous 8/19/2019 11:09 AM   Odor None 8/19/2019 11:09 AM   Margins Defined edges 8/5/2019 10:32 AM   Non-staged Wound Description Full thickness 8/5/2019 10:32 AM   Red%Wound Bed 100 8/19/2019 11:09 AM   Number of days: 21                 Asessment: Patient is a 27 y.o. male with:    Diagnosis Orders   1. Skin ulcer of right heel with fat layer exposed ([address]     2. Pressure injury of left heel, stage 3 ([address]     3.  DM type 2 with

## 2019-08-19 NOTE — PATIENT INSTRUCTIONS
Continue with silver alginate dressing to both heel wounds, change daily  Follow up with Dr Aristides Callahan in 1 week  MRI scheduled for 8/20/19 at [facility]

## 2019-08-20 ENCOUNTER — HOSPITAL ENCOUNTER (OUTPATIENT)
Dept: MRI IMAGING | Age: 34
Discharge: HOME | End: 2019-08-22
Payer: MEDICARE

## 2019-08-20 ENCOUNTER — TELEPHONE (OUTPATIENT)
Dept: WOUND CARE | Age: 34
End: 2019-08-20

## 2019-08-20 DIAGNOSIS — L97.412 SKIN ULCER OF RIGHT HEEL WITH FAT LAYER EXPOSED (HCC): ICD-10-CM

## 2019-08-20 PROCEDURE — 73721 MRI JNT OF LWR EXTRE W/O DYE: CPT

## 2019-08-23 ENCOUNTER — TELEPHONE (OUTPATIENT)
Dept: WOUND CARE | Age: 34
End: 2019-08-23

## 2019-08-26 ENCOUNTER — OFFICE VISIT (OUTPATIENT)
Dept: WOUND CARE | Age: 34
End: 2019-08-26
Payer: MEDICARE

## 2019-08-26 VITALS — TEMPERATURE: 97.3 F | HEART RATE: 74 BPM | SYSTOLIC BLOOD PRESSURE: 104 MMHG | DIASTOLIC BLOOD PRESSURE: 76 MMHG

## 2019-08-26 DIAGNOSIS — L89.623 PRESSURE INJURY OF LEFT HEEL, STAGE 3 (HCC): ICD-10-CM

## 2019-08-26 DIAGNOSIS — L97.412 SKIN ULCER OF RIGHT HEEL WITH FAT LAYER EXPOSED (HCC): ICD-10-CM

## 2019-08-26 DIAGNOSIS — E11.42 DM TYPE 2 WITH DIABETIC PERIPHERAL NEUROPATHY (HCC): ICD-10-CM

## 2019-08-26 DIAGNOSIS — M86.9 OSTEOMYELITIS OF RIGHT FOOT, UNSPECIFIED TYPE (HCC): Primary | ICD-10-CM

## 2019-08-26 PROCEDURE — 11042 DBRDMT SUBQ TIS 1ST 20SQCM/<: CPT | Performed by: PODIATRIST

## 2019-08-26 PROCEDURE — 99999 PR OFFICE/OUTPT VISIT,PROCEDURE ONLY: CPT | Performed by: PODIATRIST

## 2019-08-26 PROCEDURE — 97597 DBRDMT OPN WND 1ST 20 CM/<: CPT | Performed by: PODIATRIST

## 2019-08-26 RX ORDER — KETOCONAZOLE 20 MG/ML
SHAMPOO TOPICAL DAILY PRN
COMMUNITY

## 2019-08-26 NOTE — PROGRESS NOTES
hours prn)   Yes Historical Provider, MD   insulin detemir (LEVEMIR FLEXTOUCH) 100 UNIT/ML injection pen Inject 70 Units into the skin 2 times daily  7/30/19  Yes Historical Provider, MD   levothyroxine (SYNTHROID) 50 MCG tablet Take 25 mcg by mouth Daily  1/21/18  Yes Historical Provider, MD   rosuvastatin (CRESTOR) 40 MG tablet Take 40 mg by mouth every evening 1/21/18  Yes Historical Provider, MD   albuterol sulfate  (90 BASE) MCG/ACT inhaler Inhale 2 puffs into the lungs every 6 hours as needed for Wheezing   Yes Historical Provider, MD   metFORMIN (GLUCOPHAGE) 500 MG tablet Take 1,000 mg by mouth 2 times daily (with meals)  1/21/18  Yes Historical Provider, MD   loperamide (IMODIUM) 2 MG capsule Take 2 mg by mouth 3 times daily as needed for Diarrhea    Historical Provider, MD   insulin aspart (NOVOLOG) 100 UNIT/ML injection vial Inject into the skin 3 times daily (before meals)    Historical Provider, MD   carBAMazepine (TEGRETOL XR) 200 MG extended release tablet Take 200 mg by mouth 2 times daily 3/1/18   Historical Provider, MD   pioglitazone (ACTOS) 15 MG tablet Take 30 mg by mouth daily  1/21/18   Historical Provider, MD   CPAP Machine MISC by Does not apply route nightly    Historical Provider, MD       Social History     Tobacco Use    Smoking status: Never Smoker    Smokeless tobacco: Never Used   Substance Use Topics    Alcohol use: No     Alcohol/week: 0.0 standard drinks       Review of Systems: All 12 systems reviewed and pertinent positives noted above. Lower Extremity Physical Examination:     Vitals:   Vitals:    08/26/19 1117   BP: 104/76   Pulse: 74   Temp: 97.3 °F (36.3 °C)     General: AAO x 3 in NAD.      Vascular: DP and PT pulses palpable 2/4, bilateral.  CFT <3 seconds, bilateral.  Hair growth present to the level of the digits, bilateral.  Edema present, bilateral.  Varicosities present, bilateral. Erythema absent, bilateral. Distal Rubor absent bilateral.  Temperature of Visits Requested:   1    OK DEBRIDEMENT OPEN WOUND 20 SQ CM<    OK DEBRIDEMENT, SKIN, SUB-Q TISSUE,=<20 SQ CM     Will get opinion from ID regarding OM changes on MRI and possible longer tem tx with abx. Pt had a previous sx at that area and may be residual changes from that. Noted that the intra op bone culture was diptheroids scant growth only at time of his sx intervention. MRI reviewed with the pt in detail. All questions answered.   Follow up in 1 week

## 2019-08-26 NOTE — PATIENT INSTRUCTIONS
Continue with same dressing to both heel wounds, silver alginate, roll gauze change dressing daily  Follow up with Dr Grace Sandhu next week  Consult with infectious disease

## 2019-08-27 ENCOUNTER — TELEPHONE (OUTPATIENT)
Dept: WOUND CARE | Age: 34
End: 2019-08-27

## 2019-08-28 ENCOUNTER — TELEPHONE (OUTPATIENT)
Dept: WOUND CARE | Age: 34
End: 2019-08-28

## 2019-08-30 ENCOUNTER — TELEPHONE (OUTPATIENT)
Dept: WOUND CARE | Age: 34
End: 2019-08-30

## 2019-09-03 ENCOUNTER — OFFICE VISIT (OUTPATIENT)
Dept: WOUND CARE | Age: 34
End: 2019-09-03
Payer: MEDICARE

## 2019-09-03 VITALS
HEART RATE: 72 BPM | DIASTOLIC BLOOD PRESSURE: 64 MMHG | SYSTOLIC BLOOD PRESSURE: 122 MMHG | TEMPERATURE: 98.1 F | RESPIRATION RATE: 20 BRPM

## 2019-09-03 DIAGNOSIS — E11.42 DM TYPE 2 WITH DIABETIC PERIPHERAL NEUROPATHY (HCC): ICD-10-CM

## 2019-09-03 DIAGNOSIS — L89.623 PRESSURE INJURY OF LEFT HEEL, STAGE 3 (HCC): ICD-10-CM

## 2019-09-03 DIAGNOSIS — L97.412 SKIN ULCER OF RIGHT HEEL WITH FAT LAYER EXPOSED (HCC): Primary | ICD-10-CM

## 2019-09-03 PROCEDURE — 99999 PR OFFICE/OUTPT VISIT,PROCEDURE ONLY: CPT | Performed by: PODIATRIST

## 2019-09-03 PROCEDURE — 97597 DBRDMT OPN WND 1ST 20 CM/<: CPT | Performed by: PODIATRIST

## 2019-09-03 NOTE — PROGRESS NOTES
Subjective:    Sixto Vera is a 27 y.o. male who presents for ulcer b/l heel. Pt has wore offloading boots as advised. No signs of infx seen . Pt currently at nursing home, may be transferring to filling home soon. Pt has no pain. Currently denies F/C/N/V. No Known Allergies    Past Medical History:   Diagnosis Date    Diabetes mellitus ([facility]     Hyperlipidemia     Prader-Willi syndrome     Sleep apnea        Prior to Admission medications    Medication Sig Start Date End Date Taking? Authorizing Provider   mupirocin (BACTROBAN) 2 % ointment Apply topically 2 times daily Apply topically 3 times daily. Yes Historical Provider, MD   ketoconazole (NIZORAL) 2 % shampoo Apply topically daily as needed for Itching Apply topically daily as needed.    Yes Historical Provider, MD   Insulin Lispro (HUMALOG SC) Inject 15 Units into the skin daily 7/30/19  Yes Historical Provider, MD   oxybutynin (DITROPAN-XL) 5 MG extended release tablet Take 5 mg by mouth daily 5/17/19  Yes Historical Provider, MD   furosemide (LASIX) 40 MG tablet Take 40 mg by mouth daily 5/28/19  Yes Historical Provider, MD   escitalopram (LEXAPRO) 10 MG tablet Take 10 mg by mouth daily 5/28/19  Yes Historical Provider, MD   nystatin (MYCOSTATIN) 255865 UNIT/GM powder Apply 100,000 Units topically 4 times daily 5/26/19  Yes Historical Provider, MD   potassium chloride (KLOR-CON M) 20 MEQ extended release tablet Take 20 mEq by mouth 2 times daily 5/28/19  Yes Historical Provider, MD   pantoprazole (PROTONIX) 40 MG tablet Take 40 mg by mouth daily 5/28/19  Yes Historical Provider, MD   Probiotic Product (PROBIOTIC-10 PO) Take by mouth daily 5/29/19  Yes Historical Provider, MD   ARIPiprazole (ABILIFY) 10 MG tablet Take 1 tablet by mouth daily 3/8/19  Yes Gil Irving MD   loperamide (IMODIUM) 2 MG capsule Take 2 mg by mouth 3 times daily as needed for Diarrhea   Yes Historical Provider, MD   acetaminophen (TYLENOL) 500 MG tablet Take 500

## 2019-09-03 NOTE — PATIENT INSTRUCTIONS
Continue with silver alginate dressing with optilock to both heel wounds, home health to change daily, patient's mother changes at bedtime.   Follow up with Dr Alyssa Palma on 9/9/19

## 2019-09-09 ENCOUNTER — OFFICE VISIT (OUTPATIENT)
Dept: WOUND CARE | Age: 34
End: 2019-09-09
Payer: MEDICARE

## 2019-09-09 VITALS — HEART RATE: 82 BPM | DIASTOLIC BLOOD PRESSURE: 80 MMHG | SYSTOLIC BLOOD PRESSURE: 128 MMHG | TEMPERATURE: 97.9 F

## 2019-09-09 DIAGNOSIS — E11.42 DM TYPE 2 WITH DIABETIC PERIPHERAL NEUROPATHY (HCC): ICD-10-CM

## 2019-09-09 DIAGNOSIS — L97.412 SKIN ULCER OF RIGHT HEEL WITH FAT LAYER EXPOSED (HCC): Primary | ICD-10-CM

## 2019-09-09 DIAGNOSIS — L89.623 PRESSURE INJURY OF LEFT HEEL, STAGE 3 (HCC): ICD-10-CM

## 2019-09-09 PROCEDURE — 99999 PR OFFICE/OUTPT VISIT,PROCEDURE ONLY: CPT | Performed by: PODIATRIST

## 2019-09-09 PROCEDURE — 97597 DBRDMT OPN WND 1ST 20 CM/<: CPT | Performed by: PODIATRIST

## 2019-09-09 NOTE — PROGRESS NOTES
Subjective:    Gideon Avalos is a 27 y.o. male who presents for ulcer b/l heel. Pt has wore offloading boots as advised. Pt going to filling home starting this Thursday. No signs of infx seen at home. Pt has no pain. Currently denies F/C/N/V. No Known Allergies    Past Medical History:   Diagnosis Date    Diabetes mellitus ([address]     Hyperlipidemia     Prader-Willi syndrome     Sleep apnea        Prior to Admission medications    Medication Sig Start Date End Date Taking? Authorizing Provider   mupirocin (BACTROBAN) 2 % ointment Apply topically 2 times daily Apply topically 3 times daily. Yes Historical Provider, MD   ketoconazole (NIZORAL) 2 % shampoo Apply topically daily as needed for Itching Apply topically daily as needed.    Yes Historical Provider, MD   Insulin Lispro (HUMALOG SC) Inject 15 Units into the skin daily 7/30/19  Yes Historical Provider, MD   oxybutynin (DITROPAN-XL) 5 MG extended release tablet Take 5 mg by mouth daily 5/17/19  Yes Historical Provider, MD   furosemide (LASIX) 40 MG tablet Take 40 mg by mouth daily 5/28/19  Yes Historical Provider, MD   escitalopram (LEXAPRO) 10 MG tablet Take 10 mg by mouth daily 5/28/19  Yes Historical Provider, MD   nystatin (MYCOSTATIN) 038187 UNIT/GM powder Apply 100,000 Units topically 4 times daily 5/26/19  Yes Historical Provider, MD   potassium chloride (KLOR-CON M) 20 MEQ extended release tablet Take 20 mEq by mouth 2 times daily 5/28/19  Yes Historical Provider, MD   pantoprazole (PROTONIX) 40 MG tablet Take 40 mg by mouth daily 5/28/19  Yes Historical Provider, MD   Probiotic Product (PROBIOTIC-10 PO) Take by mouth daily 5/29/19  Yes Historical Provider, MD   ARIPiprazole (ABILIFY) 10 MG tablet Take 1 tablet by mouth daily 3/8/19  Yes Argentina Beasley MD   loperamide (IMODIUM) 2 MG capsule Take 2 mg by mouth 3 times daily as needed for Diarrhea   Yes Historical Provider, MD   acetaminophen (TYLENOL) 500 MG tablet Take 500 mg by mouth

## 2019-09-11 ENCOUNTER — TELEPHONE (OUTPATIENT)
Dept: PODIATRY | Age: 34
End: 2019-09-11

## 2019-09-11 ENCOUNTER — TELEPHONE (OUTPATIENT)
Dept: WOUND CARE | Age: 34
End: 2019-09-11

## 2019-09-16 ENCOUNTER — OFFICE VISIT (OUTPATIENT)
Dept: WOUND CARE | Age: 34
End: 2019-09-16
Payer: MEDICARE

## 2019-09-16 VITALS
DIASTOLIC BLOOD PRESSURE: 78 MMHG | RESPIRATION RATE: 18 BRPM | TEMPERATURE: 97.2 F | SYSTOLIC BLOOD PRESSURE: 138 MMHG | HEART RATE: 60 BPM

## 2019-09-16 DIAGNOSIS — E11.42 DM TYPE 2 WITH DIABETIC PERIPHERAL NEUROPATHY (HCC): ICD-10-CM

## 2019-09-16 DIAGNOSIS — L89.623 PRESSURE INJURY OF LEFT HEEL, STAGE 3 (HCC): ICD-10-CM

## 2019-09-16 DIAGNOSIS — L97.412 SKIN ULCER OF RIGHT HEEL WITH FAT LAYER EXPOSED (HCC): Primary | ICD-10-CM

## 2019-09-16 PROCEDURE — 99999 PR OFFICE/OUTPT VISIT,PROCEDURE ONLY: CPT | Performed by: PODIATRIST

## 2019-09-16 PROCEDURE — 97597 DBRDMT OPN WND 1ST 20 CM/<: CPT | Performed by: PODIATRIST

## 2019-09-16 ASSESSMENT — PATIENT HEALTH QUESTIONNAIRE - PHQ9: DEPRESSION UNABLE TO ASSESS: FUNCTIONAL CAPACITY MOTIVATION LIMITS ACCURACY

## 2019-09-16 NOTE — PROGRESS NOTES
Ulcer Classification:  R heel 3C R 1C L  IDSA  no infection. Plan:  DM foot ed and exam  Active wound management took place (b/l heel ulcer) 100% non excisional with the use of  curette. All non viable tissue (including epidermis and/or dermis) and bio burden was removed to promote healing. Bleeding was present. Please see chart for exact measurements, if not documented then size was less than 20 sq cm. Continue offloading as advised. Dressing: silver alginate then optilock b/l, change PRN if dressings get soiled  Continue offloading boot to wear at all times to relieve pressure from heel. Pt was educated on importance of this and how this can facilitate healing. Information was given on how to order this device. Different transferring options dicussed. Pt has been unstable in prafo boots. Pt to go back to sx shoe with insoles for offloading. Sounds questionable if pt has these, theys hould let us know. Follow up in clinic post hbo2, pt set to start next week in [facility].

## 2019-09-17 ENCOUNTER — TELEPHONE (OUTPATIENT)
Dept: PODIATRY | Age: 34
End: 2019-09-17

## 2019-09-17 ENCOUNTER — TELEPHONE (OUTPATIENT)
Dept: WOUND CARE | Age: 34
End: 2019-09-17

## 2019-09-18 ENCOUNTER — TELEPHONE (OUTPATIENT)
Dept: WOUND CARE | Age: 34
End: 2019-09-18

## 2019-09-19 ENCOUNTER — OFFICE VISIT (OUTPATIENT)
Dept: INFECTIOUS DISEASES | Age: 34
End: 2019-09-19
Payer: MEDICARE

## 2019-09-19 VITALS
SYSTOLIC BLOOD PRESSURE: 105 MMHG | DIASTOLIC BLOOD PRESSURE: 71 MMHG | HEART RATE: 66 BPM | RESPIRATION RATE: 16 BRPM | OXYGEN SATURATION: 98 %

## 2019-09-19 DIAGNOSIS — I87.2 VENOUS STASIS DERMATITIS OF BOTH LOWER EXTREMITIES: ICD-10-CM

## 2019-09-19 DIAGNOSIS — S91.301A OPEN WOUND OF PLANTAR ASPECT OF FOOT, RIGHT, INITIAL ENCOUNTER: ICD-10-CM

## 2019-09-19 DIAGNOSIS — M86.679 CHRONIC OSTEOMYELITIS OF ANKLE (HCC): Primary | ICD-10-CM

## 2019-09-19 DIAGNOSIS — S91.302A: ICD-10-CM

## 2019-09-19 DIAGNOSIS — E66.01 MORBID OBESITY (HCC): ICD-10-CM

## 2019-09-19 DIAGNOSIS — B37.2 INTERTRIGINOUS CANDIDIASIS: ICD-10-CM

## 2019-09-19 PROCEDURE — G8417 CALC BMI ABV UP PARAM F/U: HCPCS | Performed by: INTERNAL MEDICINE

## 2019-09-19 PROCEDURE — G8427 DOCREV CUR MEDS BY ELIG CLIN: HCPCS | Performed by: INTERNAL MEDICINE

## 2019-09-19 PROCEDURE — 1036F TOBACCO NON-USER: CPT | Performed by: INTERNAL MEDICINE

## 2019-09-19 PROCEDURE — 99203 OFFICE O/P NEW LOW 30 MIN: CPT | Performed by: INTERNAL MEDICINE

## 2019-09-20 ENCOUNTER — TELEPHONE (OUTPATIENT)
Dept: WOUND CARE | Age: 34
End: 2019-09-20

## 2019-09-22 ASSESSMENT — ENCOUNTER SYMPTOMS
COLOR CHANGE: 1
RESPIRATORY NEGATIVE: 1
GASTROINTESTINAL NEGATIVE: 1

## 2019-09-23 ENCOUNTER — OFFICE VISIT (OUTPATIENT)
Dept: WOUND CARE | Age: 34
End: 2019-09-23
Payer: MEDICARE

## 2019-09-23 VITALS — DIASTOLIC BLOOD PRESSURE: 72 MMHG | SYSTOLIC BLOOD PRESSURE: 102 MMHG | HEART RATE: 68 BPM | TEMPERATURE: 97.9 F

## 2019-09-23 DIAGNOSIS — E11.42 DM TYPE 2 WITH DIABETIC PERIPHERAL NEUROPATHY (HCC): ICD-10-CM

## 2019-09-23 DIAGNOSIS — L89.623 PRESSURE INJURY OF LEFT HEEL, STAGE 3 (HCC): ICD-10-CM

## 2019-09-23 DIAGNOSIS — L97.412 SKIN ULCER OF RIGHT HEEL WITH FAT LAYER EXPOSED (HCC): Primary | ICD-10-CM

## 2019-09-23 PROCEDURE — 97597 DBRDMT OPN WND 1ST 20 CM/<: CPT | Performed by: PODIATRIST

## 2019-09-23 PROCEDURE — 99999 PR OFFICE/OUTPT VISIT,PROCEDURE ONLY: CPT | Performed by: PODIATRIST

## 2019-09-23 RX ORDER — ARGININE/ASCORBATE SOD/VITE AC 4.5 G/9.2G
1 POWDER IN PACKET (EA) ORAL 2 TIMES DAILY
COMMUNITY
End: 2020-10-20 | Stop reason: ALTCHOICE

## 2019-09-23 NOTE — PROGRESS NOTES
Subjective:    Simin Beebe is a 27 y.o. male who presents for ulcer b/l heel. Pt has wore offloading boots as advised. No signs of infx seen at home. Pt has no pain. Currently denies F/C/N/V. No Known Allergies    Past Medical History:   Diagnosis Date    Diabetes mellitus ([facility] [address]     Hyperlipidemia     Prader-Willi syndrome     Sleep apnea        Prior to Admission medications    Medication Sig Start Date End Date Taking?  Authorizing Provider   Nutritional Supplements (ARGINAID) PACK Take 1 packet by mouth 2 times daily   Yes Historical Provider, MD   Pollen Extracts (PROSTAT PO) Take 1 oz by mouth 2 times daily Indications: nutritional supplement   Yes Historical Provider, MD   oxybutynin (DITROPAN-XL) 5 MG extended release tablet Take 5 mg by mouth daily 5/17/19  Yes Historical Provider, MD   escitalopram (LEXAPRO) 10 MG tablet Take 10 mg by mouth daily 5/28/19  Yes Historical Provider, MD   nystatin (MYCOSTATIN) [id number] UNIT/GM powder Apply 100,000 Units topically 4 times daily 5/26/19  Yes Historical Provider, MD   potassium chloride (KLOR-CON M) 20 MEQ extended release tablet Take 20 mEq by mouth 2 times daily 5/28/19  Yes Historical Provider, MD   pantoprazole (PROTONIX) 40 MG tablet Take 40 mg by mouth daily 5/28/19  Yes Historical Provider, MD   ARIPiprazole (ABILIFY) 10 MG tablet Take 1 tablet by mouth daily 3/8/19  Yes Nimesh Bright MD   insulin detemir (LEVEMIR FLEXTOUCH) 100 UNIT/ML injection pen Inject 75 Units into the skin 2 times daily  7/30/19  Yes Historical Provider, MD   insulin aspart (NOVOLOG) 100 UNIT/ML injection vial Inject into the skin 3 times daily (before meals)   Yes Historical Provider, MD   levothyroxine (SYNTHROID) 25 MCG tablet Take 25 mcg by mouth Daily  1/21/18  Yes Historical Provider, MD   pioglitazone (ACTOS) 30 MG tablet Take 30 mg by mouth daily  1/21/18  Yes Historical Provider, MD   rosuvastatin (CRESTOR) 40 MG tablet Take 40 mg by mouth every evening 1/21/18  Yes Historical Provider, MD   albuterol sulfate  (90 BASE) MCG/ACT inhaler Inhale 2 puffs into the lungs every 6 hours as needed for Wheezing   Yes Historical Provider, MD   metFORMIN (GLUCOPHAGE) 500 MG tablet Take 1,000 mg by mouth 2 times daily (with meals)  1/21/18  Yes Historical Provider, MD   mupirocin (BACTROBAN) 2 % ointment Apply topically 2 times daily Apply topically 3 times daily. Historical Provider, MD   ketoconazole (NIZORAL) 2 % shampoo Apply topically daily as needed for Itching Apply topically daily as needed. Historical Provider, MD   furosemide (LASIX) 40 MG tablet Take 40 mg by mouth daily 5/28/19   Historical Provider, MD   loperamide (IMODIUM) 2 MG capsule Take 2 mg by mouth 3 times daily as needed for Diarrhea    Historical Provider, MD   acetaminophen (TYLENOL) 500 MG tablet Take 500 mg by mouth every 6 hours as needed for Pain or Fever (q 4-6 hours prn)    Historical Provider, MD   CPAP Machine MISC by Does not apply route nightly    Historical Provider, MD       Social History     Tobacco Use    Smoking status: Never Smoker    Smokeless tobacco: Never Used   Substance Use Topics    Alcohol use: No     Alcohol/week: 0.0 standard drinks       Review of Systems: All 12 systems reviewed and pertinent positives noted above. Lower Extremity Physical Examination:     Vitals:   Vitals:    09/23/19 1043   BP: 102/72   Pulse: 68   Temp: 97.9 °F (36.6 °C)     General: AAO x 3 in NAD. Vascular: DP and PT pulses palpable 2/4, bilateral.  CFT <3 seconds, bilateral.  Hair growth present to the level of the digits, bilateral.  Edema present, bilateral.  Varicosities present, bilateral. Erythema absent, bilateral. Distal Rubor absent bilateral.  Temperature within normal limits bilateral. Hyperpigmentation present bilateral. Atrophic skin yes.   Neurological: Sensation Impaired to light touch to level of digits, bilateral.  Protective sensation intact  10/10 sites via

## 2019-09-24 ENCOUNTER — TELEPHONE (OUTPATIENT)
Dept: WOUND CARE | Age: 34
End: 2019-09-24

## 2019-09-26 ENCOUNTER — TELEPHONE (OUTPATIENT)
Dept: WOUND CARE | Age: 34
End: 2019-09-26

## 2019-09-30 ENCOUNTER — OFFICE VISIT (OUTPATIENT)
Dept: WOUND CARE | Age: 34
End: 2019-09-30
Payer: MEDICARE

## 2019-09-30 VITALS
HEART RATE: 60 BPM | SYSTOLIC BLOOD PRESSURE: 106 MMHG | WEIGHT: 315 LBS | DIASTOLIC BLOOD PRESSURE: 70 MMHG | RESPIRATION RATE: 20 BRPM | TEMPERATURE: 97.8 F | BODY MASS INDEX: 43.8 KG/M2

## 2019-09-30 DIAGNOSIS — E11.42 DM TYPE 2 WITH DIABETIC PERIPHERAL NEUROPATHY (HCC): ICD-10-CM

## 2019-09-30 DIAGNOSIS — L97.412 SKIN ULCER OF RIGHT HEEL WITH FAT LAYER EXPOSED (HCC): Primary | ICD-10-CM

## 2019-09-30 DIAGNOSIS — L89.623 PRESSURE INJURY OF LEFT HEEL, STAGE 3 (HCC): ICD-10-CM

## 2019-09-30 PROCEDURE — 97597 DBRDMT OPN WND 1ST 20 CM/<: CPT | Performed by: PODIATRIST

## 2019-09-30 PROCEDURE — 99999 PR OFFICE/OUTPT VISIT,PROCEDURE ONLY: CPT | Performed by: PODIATRIST

## 2019-09-30 ASSESSMENT — PATIENT HEALTH QUESTIONNAIRE - PHQ9: DEPRESSION UNABLE TO ASSESS: FUNCTIONAL CAPACITY MOTIVATION LIMITS ACCURACY

## 2019-09-30 NOTE — PROGRESS NOTES
Subjective:    Melissa Silveira is a 27 y.o. male who presents for ulcers b/l heel. Pt has wore offloading boots as advised. Pt presents with staff from filling home. No signs of infx seen at home. Pt has no pain. Hyperbaric still not started since transportation cannot be set up. Currently denies F/C/N/V. No Known Allergies    Past Medical History:   Diagnosis Date    Diabetes mellitus ([facility] [facility]     Hyperlipidemia     Prader-Willi syndrome     Sleep apnea        Prior to Admission medications    Medication Sig Start Date End Date Taking? Authorizing Provider   silver sulfADIAZINE (SILVADENE) 1 % cream Apply topically daily. 9/30/19  Yes Nieves Gutierrez DPM   Nutritional Supplements (ARGINAID) PACK Take 1 packet by mouth 2 times daily   Yes Historical Provider, MD   Pollen Extracts (PROSTAT PO) Take 1 oz by mouth 2 times daily Indications: nutritional supplement   Yes Historical Provider, MD   mupirocin (BACTROBAN) 2 % ointment Apply topically 2 times daily Apply topically 3 times daily. Yes Historical Provider, MD   ketoconazole (NIZORAL) 2 % shampoo Apply topically daily as needed for Itching Apply topically daily as needed.    Yes Historical Provider, MD   oxybutynin (DITROPAN-XL) 5 MG extended release tablet Take 5 mg by mouth daily 5/17/19  Yes Historical Provider, MD   furosemide (LASIX) 40 MG tablet Take 40 mg by mouth daily 5/28/19  Yes Historical Provider, MD   escitalopram (LEXAPRO) 10 MG tablet Take 10 mg by mouth daily 5/28/19  Yes Historical Provider, MD   nystatin (MYCOSTATIN) 077665 UNIT/GM powder Apply 100,000 Units topically 4 times daily 5/26/19  Yes Historical Provider, MD   potassium chloride (KLOR-CON M) 20 MEQ extended release tablet Take 20 mEq by mouth 2 times daily 5/28/19  Yes Historical Provider, MD   pantoprazole (PROTONIX) 40 MG tablet Take 40 mg by mouth daily 5/28/19  Yes Historical Provider, MD   ARIPiprazole (ABILIFY) 10 MG tablet Take 1 tablet by mouth daily 3/8/19  Yes Michael Zapien MD   loperamide (IMODIUM) 2 MG capsule Take 2 mg by mouth 3 times daily as needed for Diarrhea   Yes Historical Provider, MD   acetaminophen (TYLENOL) 500 MG tablet Take 500 mg by mouth every 6 hours as needed for Pain or Fever (q 4-6 hours prn)   Yes Historical Provider, MD   insulin detemir (LEVEMIR FLEXTOUCH) 100 UNIT/ML injection pen Inject 75 Units into the skin 2 times daily  7/30/19  Yes Historical Provider, MD   insulin aspart (NOVOLOG) 100 UNIT/ML injection vial Inject into the skin 3 times daily (before meals)   Yes Historical Provider, MD   levothyroxine (SYNTHROID) 25 MCG tablet Take 25 mcg by mouth Daily  1/21/18  Yes Historical Provider, MD   pioglitazone (ACTOS) 30 MG tablet Take 30 mg by mouth daily  1/21/18  Yes Historical Provider, MD   rosuvastatin (CRESTOR) 40 MG tablet Take 40 mg by mouth every evening 1/21/18  Yes Historical Provider, MD   albuterol sulfate  (90 BASE) MCG/ACT inhaler Inhale 2 puffs into the lungs every 6 hours as needed for Wheezing   Yes Historical Provider, MD   CPAP Machine MISC by Does not apply route nightly   Yes Historical Provider, MD   metFORMIN (GLUCOPHAGE) 500 MG tablet Take 1,000 mg by mouth 2 times daily (with meals)  1/21/18  Yes Historical Provider, MD       Social History     Tobacco Use    Smoking status: Never Smoker    Smokeless tobacco: Never Used   Substance Use Topics    Alcohol use: No     Alcohol/week: 0.0 standard drinks       Review of Systems: All 12 systems reviewed and pertinent positives noted above. Lower Extremity Physical Examination:     Vitals:   Vitals:    09/30/19 1122   BP: 106/70   Pulse: 60   Resp: 20   Temp: 97.8 °F (36.6 °C)     General: AAO x 3 in NAD.      Vascular: DP and PT pulses palpable 2/4, bilateral.  CFT <3 seconds, bilateral.  Hair growth present to the level of the digits, bilateral.  Edema present, bilateral.  Varicosities present, bilateral. Erythema absent, bilateral. Distal Rubor absent

## 2019-10-03 ENCOUNTER — OFFICE VISIT (OUTPATIENT)
Dept: PODIATRY | Age: 34
End: 2019-10-03
Payer: MEDICARE

## 2019-10-03 VITALS — SYSTOLIC BLOOD PRESSURE: 126 MMHG | DIASTOLIC BLOOD PRESSURE: 70 MMHG | HEART RATE: 68 BPM | RESPIRATION RATE: 20 BRPM

## 2019-10-03 DIAGNOSIS — B35.1 DERMATOPHYTOSIS OF NAIL: ICD-10-CM

## 2019-10-03 DIAGNOSIS — E11.51 CONTROLLED TYPE 2 DM WITH PERIPHERAL CIRCULATORY DISORDER (HCC): Primary | ICD-10-CM

## 2019-10-03 PROCEDURE — 99999 PR OFFICE/OUTPT VISIT,PROCEDURE ONLY: CPT | Performed by: PODIATRIST

## 2019-10-03 PROCEDURE — 11721 DEBRIDE NAIL 6 OR MORE: CPT | Performed by: PODIATRIST

## 2019-10-07 ENCOUNTER — OFFICE VISIT (OUTPATIENT)
Dept: WOUND CARE | Age: 34
End: 2019-10-07
Payer: MEDICARE

## 2019-10-07 VITALS — SYSTOLIC BLOOD PRESSURE: 112 MMHG | HEART RATE: 76 BPM | DIASTOLIC BLOOD PRESSURE: 68 MMHG | TEMPERATURE: 96.4 F

## 2019-10-07 DIAGNOSIS — E11.42 DM TYPE 2 WITH DIABETIC PERIPHERAL NEUROPATHY (HCC): ICD-10-CM

## 2019-10-07 DIAGNOSIS — L89.623 PRESSURE INJURY OF LEFT HEEL, STAGE 3 (HCC): ICD-10-CM

## 2019-10-07 DIAGNOSIS — L97.412 SKIN ULCER OF RIGHT HEEL WITH FAT LAYER EXPOSED (HCC): Primary | ICD-10-CM

## 2019-10-07 DIAGNOSIS — L97.921 SKIN ULCER OF LEFT LOWER LEG, LIMITED TO BREAKDOWN OF SKIN (HCC): ICD-10-CM

## 2019-10-07 PROCEDURE — A9283 FOOT PRESS OFF LOAD SUPP DEV: HCPCS | Performed by: PODIATRIST

## 2019-10-07 PROCEDURE — 97597 DBRDMT OPN WND 1ST 20 CM/<: CPT | Performed by: PODIATRIST

## 2019-10-07 PROCEDURE — 99999 PR OFFICE/OUTPT VISIT,PROCEDURE ONLY: CPT | Performed by: PODIATRIST

## 2019-10-08 ENCOUNTER — TELEPHONE (OUTPATIENT)
Dept: WOUND CARE | Age: 34
End: 2019-10-08

## 2019-10-14 ENCOUNTER — OFFICE VISIT (OUTPATIENT)
Dept: WOUND CARE | Age: 34
End: 2019-10-14
Payer: MEDICARE

## 2019-10-14 ENCOUNTER — TELEPHONE (OUTPATIENT)
Dept: WOUND CARE | Age: 34
End: 2019-10-14

## 2019-10-14 ENCOUNTER — TELEPHONE (OUTPATIENT)
Dept: PODIATRY | Age: 34
End: 2019-10-14

## 2019-10-14 VITALS — TEMPERATURE: 97.9 F | DIASTOLIC BLOOD PRESSURE: 80 MMHG | HEART RATE: 70 BPM | SYSTOLIC BLOOD PRESSURE: 118 MMHG

## 2019-10-14 DIAGNOSIS — L89.623 PRESSURE INJURY OF LEFT HEEL, STAGE 3 (HCC): ICD-10-CM

## 2019-10-14 DIAGNOSIS — L97.921 SKIN ULCER OF LEFT LOWER LEG, LIMITED TO BREAKDOWN OF SKIN (HCC): ICD-10-CM

## 2019-10-14 DIAGNOSIS — L97.412 SKIN ULCER OF RIGHT HEEL WITH FAT LAYER EXPOSED (HCC): Primary | ICD-10-CM

## 2019-10-14 DIAGNOSIS — E11.42 DM TYPE 2 WITH DIABETIC PERIPHERAL NEUROPATHY (HCC): ICD-10-CM

## 2019-10-14 PROCEDURE — 97597 DBRDMT OPN WND 1ST 20 CM/<: CPT | Performed by: PODIATRIST

## 2019-10-14 PROCEDURE — 97598 DBRDMT OPN WND ADDL 20CM/<: CPT | Performed by: PODIATRIST

## 2019-10-14 PROCEDURE — 99999 PR OFFICE/OUTPT VISIT,PROCEDURE ONLY: CPT | Performed by: PODIATRIST

## 2019-10-15 ENCOUNTER — TELEPHONE (OUTPATIENT)
Dept: WOUND CARE | Age: 34
End: 2019-10-15

## 2019-10-17 ENCOUNTER — TELEPHONE (OUTPATIENT)
Dept: WOUND CARE | Age: 34
End: 2019-10-17

## 2019-10-23 ENCOUNTER — TELEPHONE (OUTPATIENT)
Dept: WOUND CARE | Age: 34
End: 2019-10-23

## 2019-11-08 ENCOUNTER — TELEPHONE (OUTPATIENT)
Dept: INFECTIOUS DISEASES | Age: 34
End: 2019-11-08

## 2019-12-12 ENCOUNTER — OFFICE VISIT (OUTPATIENT)
Dept: PODIATRY | Age: 34
End: 2019-12-12
Payer: MEDICARE

## 2019-12-12 VITALS — SYSTOLIC BLOOD PRESSURE: 118 MMHG | HEART RATE: 76 BPM | RESPIRATION RATE: 20 BRPM | DIASTOLIC BLOOD PRESSURE: 60 MMHG

## 2019-12-12 DIAGNOSIS — E11.51 CONTROLLED TYPE 2 DM WITH PERIPHERAL CIRCULATORY DISORDER (HCC): Primary | ICD-10-CM

## 2019-12-12 DIAGNOSIS — B35.1 DERMATOPHYTOSIS OF NAIL: ICD-10-CM

## 2019-12-12 PROCEDURE — 11721 DEBRIDE NAIL 6 OR MORE: CPT | Performed by: PODIATRIST

## 2019-12-12 PROCEDURE — 99999 PR OFFICE/OUTPT VISIT,PROCEDURE ONLY: CPT | Performed by: PODIATRIST

## 2020-01-01 NOTE — PATIENT INSTRUCTIONS
1) continue silver alginate to bilateral heel wounds, change daily, and as needed for soiling or dislodged. 2) Ok to shower with dressings off, wash with mild soap & water, rinse well & pat dry gently before applying clean dressings. 3) left lower leg scab-leave open to air, or cover with dry dressing if needed to manage light drainage or for patient comfort. 4) Home compression stockings when available, on every morning, off at bedtime. Continue non-weight bearing to right heel, except for transfers. Wound care per [address] [place] [address] while receiving Hyperbaric Treatment. SIGNS OF INFECTION  - Redness, swelling, skin hot  - Wound bed turns black or stringy yellow  - Foul odor  - Increased drainage or pus  - Increased pain  - Fever greater than 100F    CALL YOUR DOCTOR OR SEEK MEDICAL ATTENTION IF SIGNS OF INFECTION. DO NOT WAIT UNTIL YOUR NEXT APPOINTMENT    Call the Wound Care Nurse with any other questions or concerns- [phone]. Statement Selected

## 2020-01-27 ENCOUNTER — OFFICE VISIT (OUTPATIENT)
Dept: UROLOGY | Age: 35
End: 2020-01-27
Payer: MEDICARE

## 2020-01-27 VITALS — OXYGEN SATURATION: 96 % | HEIGHT: 73 IN | WEIGHT: 315 LBS | BODY MASS INDEX: 41.75 KG/M2 | HEART RATE: 66 BPM

## 2020-01-27 PROCEDURE — G8484 FLU IMMUNIZE NO ADMIN: HCPCS | Performed by: UROLOGY

## 2020-01-27 PROCEDURE — G8427 DOCREV CUR MEDS BY ELIG CLIN: HCPCS | Performed by: UROLOGY

## 2020-01-27 PROCEDURE — 99213 OFFICE O/P EST LOW 20 MIN: CPT | Performed by: UROLOGY

## 2020-01-27 PROCEDURE — 51798 US URINE CAPACITY MEASURE: CPT | Performed by: UROLOGY

## 2020-01-27 PROCEDURE — 99214 OFFICE O/P EST MOD 30 MIN: CPT

## 2020-01-27 PROCEDURE — 1036F TOBACCO NON-USER: CPT | Performed by: UROLOGY

## 2020-01-27 PROCEDURE — G8417 CALC BMI ABV UP PARAM F/U: HCPCS | Performed by: UROLOGY

## 2020-01-27 NOTE — PROGRESS NOTES
Post void residual by bladder scanner 16cc.
of Prader-Willi syndrome    3. Acquired buried penis    4. Phimosis               Plan: Will hold off on mirabegron 50 mg per patient preference  No evidence of recurrence of stricture  Overall doing well, no new infections  Follow up in one year with [facility]           Prescriptions Ordered:  No orders of the defined types were placed in this encounter. Orders Placed:  No orders of the defined types were placed in this encounter.            Mikal Woodson MD

## 2020-03-02 ENCOUNTER — TELEPHONE (OUTPATIENT)
Dept: PODIATRY | Age: 35
End: 2020-03-02

## 2020-03-02 NOTE — TELEPHONE ENCOUNTER
Spoke with patient's mother Shikha Ba, advised to contact patient current wound care facility in [address] for treatment. Patient's mother verbalizes understanding.

## 2020-03-02 NOTE — TELEPHONE ENCOUNTER
Patient's mother called and stated that she took patient to the [facility] ER on 3-1-20 due to bleeding of Rt heel. The ER had removed the cast and stated that they would not put one back on. Patient only has ace wraps on the Rt foot. Mother is very concerned with transferring and no cast on. Patient is seeing Dr. Grimm Iza on 3-3-20 at 10:45am. She would like another cast on.  Please call the mother back

## 2020-03-30 ENCOUNTER — TELEPHONE (OUTPATIENT)
Dept: INFECTIOUS DISEASES | Age: 35
End: 2020-03-30

## 2020-03-30 NOTE — TELEPHONE ENCOUNTER
----- Message from Aston Forrest sent at 3/27/2020  8:41 AM EDT -----  Margaret Mejía [facility] discharge summary patient to follow up with Dr Kimmy Silvestre in 2-4 weeks. Please call to schedule. Thank you.

## 2020-03-31 PROBLEM — J44.9 COPD (CHRONIC OBSTRUCTIVE PULMONARY DISEASE) (HCC): Status: ACTIVE | Noted: 2019-08-30

## 2020-03-31 PROBLEM — A09 DIARRHEA OF INFECTIOUS ORIGIN: Status: ACTIVE | Noted: 2019-07-12

## 2020-03-31 PROBLEM — Z23 ENCOUNTER FOR IMMUNIZATION: Status: ACTIVE | Noted: 2018-11-06

## 2020-03-31 PROBLEM — F79 UNSPECIFIED INTELLECTUAL DISABILITIES: Status: ACTIVE | Noted: 2018-02-27

## 2020-06-16 ENCOUNTER — VIRTUAL VISIT (OUTPATIENT)
Dept: INFECTIOUS DISEASES | Age: 35
End: 2020-06-16
Payer: MEDICARE

## 2020-06-16 PROCEDURE — G8427 DOCREV CUR MEDS BY ELIG CLIN: HCPCS | Performed by: INTERNAL MEDICINE

## 2020-06-16 PROCEDURE — 99214 OFFICE O/P EST MOD 30 MIN: CPT | Performed by: INTERNAL MEDICINE

## 2020-06-16 ASSESSMENT — ENCOUNTER SYMPTOMS
RESPIRATORY NEGATIVE: 1
GASTROINTESTINAL NEGATIVE: 1
ALLERGIC/IMMUNOLOGIC NEGATIVE: 1

## 2020-06-16 NOTE — PROGRESS NOTES
2020    TELEHEALTH EVALUATION -- Audio/Visual (During PUVDL-73 public health emergency)    [facility]  Today's Date and Time: 2020, 5:04 PM    Chief complaint/reason for consultation:   Stewart Mohs (:  1985) has requested an audio/video evaluation for the following concern(s):    Calcaneal OM, chronic wounds    History of Present Illness:   Stewart Mohs is a 27y.o.-year-old male who is known to me from prior office visit in 2019. The patient has a history of diabetes mellitus type 2, venous insufficiency, had an open wound on the plantar aspect of the right foot, chronic osteomyelitis of the right calcaneus, and open medial foot wound also in the right foot. When I saw him I felt that he is ulceration was healing with good granulation tissue and that aggressive surgical debridement was not really warranted    Patient tells me that he subsequently has been following at the [facility] wound [address] where he underwent surgery on the foot and was treated with intravenous antimicrobial therapy with vancomycin for 6 weeks which he completed a few weeks ago. The patient does still have a small open wound on the plantar aspect of the foot and there is no significant drainage. Pain in the foot itself and the medial ankle ulceration has healed    Therapy was complicated by C. difficile infection and the patient is currently on day #12 of oral metronidazole therapy for the C. difficile infection. Patient at this point in time is still having loose stool multiple times a day. I have personally reviewedthe past medical history, medications, social history, and I have updated the database accordingly.   Past Medical History:     Past Medical History:   Diagnosis Date    Acute on chronic respiratory failure with hypercapnia ([facility] [address] 3/11/2015    Allergic rhinitis 2012    Anterior pituitary disorder ([facility] [address] 7/10/2012    Anxiety 3/14/2019    Bipolar disorder ([facility] [address] appropriate. Due to this being a TeleHealth encounter (During NAGKP-43 public health emergency), evaluation of the following organ systems was limited: Vitals/Constitutional/EENT/Resp/CV/GI//MS/Neuro/Skin/Heme-Lymph-Imm. Pursuant to the emergency declaration under the [address] [place] [address] and the [employer] and [employer] Act, this Virtual Visit was conducted with patient's (and/or legal guardian's) consent, to reduce the patient's risk of exposure to COVID-19 and provide necessary medical care. The patient (and/or legal guardian) has also been advised to contact this office for worsening conditions or problems, and seek emergency medical treatment and/or call 911 if deemed necessary. Services were provided through a video synchronous discussion virtually to substitute for in-person clinic visit. Patient and provider were located at their individual homes. --Andrews Roque MD on 6/16/2020 at 5:04 PM    An electronic signature was used to authenticate this note.

## 2020-10-20 ENCOUNTER — OFFICE VISIT (OUTPATIENT)
Dept: INFECTIOUS DISEASES | Age: 35
End: 2020-10-20
Payer: MEDICARE

## 2020-10-20 VITALS
HEIGHT: 66 IN | OXYGEN SATURATION: 94 % | DIASTOLIC BLOOD PRESSURE: 91 MMHG | WEIGHT: 315 LBS | SYSTOLIC BLOOD PRESSURE: 132 MMHG | BODY MASS INDEX: 50.62 KG/M2 | TEMPERATURE: 97.4 F | HEART RATE: 94 BPM

## 2020-10-20 PROCEDURE — G8427 DOCREV CUR MEDS BY ELIG CLIN: HCPCS | Performed by: INTERNAL MEDICINE

## 2020-10-20 PROCEDURE — 1036F TOBACCO NON-USER: CPT | Performed by: INTERNAL MEDICINE

## 2020-10-20 PROCEDURE — G8484 FLU IMMUNIZE NO ADMIN: HCPCS | Performed by: INTERNAL MEDICINE

## 2020-10-20 PROCEDURE — 99213 OFFICE O/P EST LOW 20 MIN: CPT | Performed by: INTERNAL MEDICINE

## 2020-10-20 PROCEDURE — G8417 CALC BMI ABV UP PARAM F/U: HCPCS | Performed by: INTERNAL MEDICINE

## 2020-10-20 ASSESSMENT — ENCOUNTER SYMPTOMS
ALLERGIC/IMMUNOLOGIC NEGATIVE: 1
GASTROINTESTINAL NEGATIVE: 1
RESPIRATORY NEGATIVE: 1

## 2020-10-20 NOTE — PROGRESS NOTES
[facility]  Office Progress Note  Today's Date and Time: 10/20/2020, 1:41 PM    Impression:     1. Chronic osteomyelitis of hindfoot, right (HCC)    2. Venous stasis dermatitis of both lower extremities    3. Morbid obesity ([address]         Recommendations   · The patient was previously treated for acute calcaneal osteomyelitis with IV antimicrobial therapy and never really underwent calcaneal debridement. · The patient likely has chronic osteomyelitis by imaging. · The prior wounds in the right plantar heel, medial heel have all healed. · The patient continues to have venous stasis dermatitis skin changes. · He does have a lot of dry exfoliating skin  · Infectious disease wise is remained stable with no acute infections. · He can follow-up with me on an as-needed basis. ·   I have ordered the following medications/ labs:  No orders of the defined types were placed in this encounter. No orders of the defined types were placed in this encounter. Chief complaint/reason for consultation:     Chief Complaint   Patient presents with    Osteomyelitis     Wound Check        History of Present Illness:   Alison Knight is a 38y.o.-year-old male who I am seeing in follow-up for nonhealing wounds and calcaneal osteomyelitis. Jose Loja is known to me and has a history of diabetes mellitus, venous insufficiency, open wounds on the plantar aspect of the right foot, chronic osteomyelitis of the right calcaneus, open medial foot wound also on the right foot. The patient had been treated with IV vancomycin at [facility] wound care center in May/June 2020 and at the time that I saw him in the office in June he still had an open wound. The patient has continued with local wound care and it is my understanding that his wounds have now completely healed. The patient was sent in for the history of osteomyelitis to be evaluated.   The patient is here with a caregiver from his facility he is awake and alert in no acute distress. He does not report any acute issues or concerns. I have personally reviewedthe past medical history, medications, social history, and I have updated the database accordingly. Past Medical History:     Past Medical History:   Diagnosis Date    Acute on chronic respiratory failure with hypercapnia ([address] 3/11/2015    Allergic rhinitis 1/31/2012    Anterior pituitary disorder ([address] 7/10/2012    Anxiety 3/14/2019    Bipolar disorder ([address] 3/11/2015    Cellulitis 3/4/2019    Congestive heart failure ([address] 4/14/2015    COPD (chronic obstructive pulmonary disease) ([address] 8/30/2019    Diabetes mellitus ([address]     Diabetes mellitus type 2, controlled ([address] 12/11/2015    Diabetic ulcer of right heel associated with type 2 diabetes mellitus, with fat layer exposed ([address] 2/28/2019    Diarrhea of infectious origin 7/12/2019    DM type 2 with diabetic peripheral neuropathy ([address]     DVT (deep venous thrombosis) ([address] 3/11/2015    Elevated transaminase level 3/11/2015    Encounter for immunization 11/6/2018    Hematuria 3/14/2015    History of Prader-Willi syndrome 3/11/2015    History of pulmonary embolism 9/20/2017    Hyperlipidemia     Hypertension 3/11/2015    Hypotestosteronemia in male 5/31/2017    Morbid obesity with BMI of 50.0-59.9, adult ([address] 3/11/2015    Non-pressure chronic ulcer of right lower leg, limited to breakdown of skin ([address] 2/13/2019    MICHELA (obstructive sleep apnea) 3/11/2015    Osteomyelitis of ankle or foot, acute, right ([address] 3/5/2019    Osteomyelitis of right foot ([address]     Prader-Willi syndrome     Skin ulcer of right heel with necrosis of bone ([address] 11/12/2018    Sleep apnea     Unspecified intellectual disabilities 2/27/2018     Medications:     Current Outpatient Medications   Medication Sig Dispense Refill    ketoconazole (NIZORAL) 2 % shampoo Apply topically daily as needed for Itching Apply topically daily as needed.       oxybutynin (DITROPAN-XL) 5 MG extended release tablet Take 5 mg by mouth daily  0    furosemide (LASIX) 40 MG tablet Take 40 mg by mouth daily  0    escitalopram (LEXAPRO) 10 MG tablet Take 10 mg by mouth daily  0    potassium chloride (KLOR-CON M) 20 MEQ extended release tablet Take 20 mEq by mouth 2 times daily      pantoprazole (PROTONIX) 40 MG tablet Take 40 mg by mouth daily  0    ARIPiprazole (ABILIFY) 10 MG tablet Take 1 tablet by mouth daily 30 tablet 0    loperamide (IMODIUM) 2 MG capsule Take 2 mg by mouth 3 times daily as needed for Diarrhea      acetaminophen (TYLENOL) 500 MG tablet Take 500 mg by mouth every 6 hours as needed for Pain or Fever (q 4-6 hours prn)      insulin detemir (LEVEMIR FLEXTOUCH) 100 UNIT/ML injection pen Inject 75 Units into the skin 2 times daily       insulin aspart (NOVOLOG) 100 UNIT/ML injection vial Inject into the skin 3 times daily (before meals)      levothyroxine (SYNTHROID) 25 MCG tablet Take 25 mcg by mouth Daily       rosuvastatin (CRESTOR) 40 MG tablet Take 40 mg by mouth every evening      albuterol sulfate  (90 BASE) MCG/ACT inhaler Inhale 2 puffs into the lungs every 6 hours as needed for Wheezing      CPAP Machine MISC by Does not apply route nightly      metFORMIN (GLUCOPHAGE) 500 MG tablet Take 1,000 mg by mouth 2 times daily (with meals)        No current facility-administered medications for this visit. Allergies:   Patient has no known allergies. Review of Systems:   Review of Systems   Constitutional: Negative. Respiratory: Negative. Cardiovascular: Negative. Gastrointestinal: Negative. Genitourinary: Negative. Musculoskeletal: Negative. Allergic/Immunologic: Negative. Neurological: Negative.          Physical Examination :   BP (!) 132/91 (Site: Left Lower Arm, Position: Sitting, Cuff Size: Large Adult)   Pulse 94   Temp 97.4 °F (36.3 °C) (Temporal)   Ht 5' 6\" (1.676 m)   Wt (!) 317 lb (143.8 kg)   SpO2 94% BMI 51.17 kg/m²     Physical Exam  Constitutional:       Appearance: He is well-developed. He is obese. HENT:      Head: Normocephalic and atraumatic. Mouth/Throat:      Mouth: Mucous membranes are moist.   Eyes:      Pupils: Pupils are equal, round, and reactive to light. Neck:      Musculoskeletal: Normal range of motion and neck supple. Cardiovascular:      Rate and Rhythm: Normal rate. Heart sounds: Normal heart sounds. No friction rub. No gallop. Pulmonary:      Effort: Pulmonary effort is normal.      Breath sounds: Normal breath sounds. No wheezing. Abdominal:      General: Bowel sounds are normal.      Palpations: Abdomen is soft. There is no mass. Tenderness: There is no abdominal tenderness. Musculoskeletal: Normal range of motion. Lymphadenopathy:      Cervical: No cervical adenopathy. Skin:     General: Skin is dry. Comments: The patient has dry exfoliating skin and the prior wounds have all healed   Neurological:      Mental Status: He is alert and oriented to person, place, and time.                      Laboratory studies :  Medical Decision Making:   I have independently reviewed the following labs:  CBC with Differential:  Lab Results   Component Value Date    WBC 8.5 07/29/2019    WBC 6.7 03/07/2019    HGB 15.2 07/29/2019    HGB 13.2 03/07/2019    HCT 47.0 07/29/2019    HCT 40.4 03/07/2019     07/29/2019     03/07/2019    LYMPHOPCT 25 03/07/2019    LYMPHOPCT 32 03/06/2019    MONOPCT 8 03/07/2019    MONOPCT 8 03/06/2019       BMP:  Lab Results   Component Value Date     07/29/2019     03/07/2019    K 4.2 07/29/2019    K 4.3 03/07/2019    CL 95 07/29/2019     03/07/2019    CO2 33 07/29/2019    CO2 28 03/07/2019    BUN 16 07/29/2019    BUN 13 03/07/2019    CREATININE 0.58 07/29/2019    CREATININE 0.54 03/07/2019    MG 1.9 03/11/2015    MG 1.9 03/10/2015       Hepatic Function Panel:   Lab Results   Component Value Date    PROT 7.4 07/29/2019    PROT 6.1 03/16/2015    LABALBU 4.0 07/29/2019    LABALBU 3.0 03/16/2015    BILIDIR <0.08 03/16/2015    BILIDIR 0.53 03/13/2015    IBILI CANNOT BE CALCULATED 03/16/2015    IBILI 0.35 03/13/2015    BILITOT 0.31 07/29/2019    BILITOT 0.55 03/16/2015    ALKPHOS 100 07/29/2019    ALKPHOS 73 03/16/2015    ALT 35 07/29/2019     03/16/2015    AST 29 07/29/2019    AST 35 03/16/2015       No results found for: CRP  Lab Results   Component Value Date    SEDRATE 8 07/29/2019         Thank you for allowing us to participate in the care of this patient. Pleasecall with questions. Leann Persaud MD  [employer] messaging: [phone]    This note is created with the assistance of a speech recognition program.  While intending to generate a document that actually reflects the content ofthe visit, the document can still have some errors including those of syntax and sound a like substitutions which may escape proof reading. It such instances, actual meaning can be extrapolated by contextual diversion.

## 2021-07-13 NOTE — ADDENDUM NOTE
Addended by: Heraclio Salas on: 4/9/2019 10:03 AM     Modules accepted: Orders Dapsone Pregnancy And Lactation Text: This medication is Pregnancy Category C and is not considered safe during pregnancy or breast feeding.

## 2021-12-25 NOTE — PROGRESS NOTES
scleral icterus. Neck: Normal range of motion. Neck supple. Cardiovascular: Regular rhythm and normal heart sounds. No murmur heard. Pulmonary/Chest: Effort normal and breath sounds normal. He has no wheezes. Abdominal: Soft. Bowel sounds are normal.   Obese abdomen and has a large abdominal fold some mild fungal dermatitis   Musculoskeletal: He exhibits no edema. Bilateral lower extremity venous stasis dermatitis   Lymphadenopathy:     He has no cervical adenopathy. Neurological: He is alert and oriented to person, place, and time.    Skin:   Venous stasis dermatitis  There is a right plantar heel ulceration with some mild exudate  Left lateral foot heel ulcer   Right plantar foot ulcer    Left lateral heel ulcer    Bilateral lower extremity venous stasis dermatitis        Medical Decision Making:   I haveindependently reviewed the following labs:  CBC with Differential:  Lab Results   Component Value Date    WBC 8.5 07/29/2019    WBC 6.7 03/07/2019    HGB 15.2 07/29/2019    HGB 13.2 03/07/2019    HCT 47.0 07/29/2019    HCT 40.4 03/07/2019     07/29/2019     03/07/2019    LYMPHOPCT 25 03/07/2019    LYMPHOPCT 32 03/06/2019    MONOPCT 8 03/07/2019    MONOPCT 8 03/06/2019     BMP:  Lab Results   Component Value Date     07/29/2019     03/07/2019    K 4.2 07/29/2019    K 4.3 03/07/2019    CL 95 07/29/2019     03/07/2019    CO2 33 07/29/2019    CO2 28 03/07/2019    BUN 16 07/29/2019    BUN 13 03/07/2019    CREATININE 0.58 07/29/2019    CREATININE 0.54 03/07/2019    MG 1.9 03/11/2015    MG 1.9 03/10/2015     Hepatic Function Panel:   Lab Results   Component Value Date    PROT 7.4 07/29/2019    PROT 6.1 03/16/2015    LABALBU 4.0 07/29/2019    LABALBU 3.0 03/16/2015    BILIDIR <0.08 03/16/2015    BILIDIR 0.53 03/13/2015    IBILI CANNOT BE CALCULATED 03/16/2015    IBILI 0.35 03/13/2015    BILITOT 0.31 07/29/2019    BILITOT 0.55 03/16/2015    ALKPHOS 100 07/29/2019    ALKPHOS 73 normal for race

## 2023-02-08 ENCOUNTER — HOSPITAL ENCOUNTER (OUTPATIENT)
Dept: ICU | Age: 38
Discharge: HOME | End: 2023-02-08
Attending: INTERNAL MEDICINE | Admitting: INTERNAL MEDICINE

## 2023-02-10 PROCEDURE — 87040 BLOOD CULTURE FOR BACTERIA: CPT

## 2023-02-10 PROCEDURE — 87070 CULTURE OTHR SPECIMN AEROBIC: CPT

## 2023-02-10 PROCEDURE — 87205 SMEAR GRAM STAIN: CPT

## 2023-02-10 PROCEDURE — 87116 MYCOBACTERIA CULTURE: CPT

## 2023-02-12 LAB
MICROORGANISM SPEC CULT: NORMAL
MICROORGANISM/AGENT SPEC: NORMAL
SERVICE CMNT-IMP: NORMAL
SPECIMEN DESCRIPTION: NORMAL
